# Patient Record
Sex: FEMALE | Race: WHITE | Employment: FULL TIME | URBAN - METROPOLITAN AREA
[De-identification: names, ages, dates, MRNs, and addresses within clinical notes are randomized per-mention and may not be internally consistent; named-entity substitution may affect disease eponyms.]

---

## 2017-01-20 ENCOUNTER — HOSPITAL ENCOUNTER (OUTPATIENT)
Dept: PREADMISSION TESTING | Age: 52
Discharge: HOME | End: 2017-01-20
Payer: COMMERCIAL

## 2017-01-20 VITALS
HEIGHT: 70 IN | TEMPERATURE: 97.8 F | HEART RATE: 71 BPM | SYSTOLIC BLOOD PRESSURE: 127 MMHG | BODY MASS INDEX: 41.95 KG/M2 | DIASTOLIC BLOOD PRESSURE: 81 MMHG | WEIGHT: 293 LBS

## 2017-01-20 LAB
ABO + RH BLD: NORMAL
ANION GAP BLD CALC-SCNC: 4 MMOL/L (ref 5–15)
APPEARANCE UR: ABNORMAL
ATRIAL RATE: 66 BPM
BACTERIA URNS QL MICRO: ABNORMAL /HPF
BILIRUB UR QL: NEGATIVE
BLOOD GROUP ANTIBODIES SERPL: NORMAL
BUN SERPL-MCNC: 18 MG/DL (ref 6–20)
BUN/CREAT SERPL: 32 (ref 12–20)
CALCIUM SERPL-MCNC: 9.2 MG/DL (ref 8.5–10.1)
CALCULATED P AXIS, ECG09: 48 DEGREES
CALCULATED R AXIS, ECG10: 44 DEGREES
CALCULATED T AXIS, ECG11: 22 DEGREES
CHLORIDE SERPL-SCNC: 105 MMOL/L (ref 97–108)
CO2 SERPL-SCNC: 33 MMOL/L (ref 21–32)
COLOR UR: ABNORMAL
CREAT SERPL-MCNC: 0.57 MG/DL (ref 0.55–1.02)
DIAGNOSIS, 93000: NORMAL
EPITH CASTS URNS QL MICRO: ABNORMAL /LPF
ERYTHROCYTE [DISTWIDTH] IN BLOOD BY AUTOMATED COUNT: 13.9 % (ref 11.5–14.5)
EST. AVERAGE GLUCOSE BLD GHB EST-MCNC: NORMAL MG/DL
GLUCOSE SERPL-MCNC: 92 MG/DL (ref 65–100)
GLUCOSE UR STRIP.AUTO-MCNC: NEGATIVE MG/DL
HBA1C MFR BLD: 4.8 % (ref 4.2–6.3)
HCG SERPL QL: NEGATIVE
HCT VFR BLD AUTO: 40.7 % (ref 35–47)
HGB BLD-MCNC: 13.1 G/DL (ref 11.5–16)
HGB UR QL STRIP: ABNORMAL
HYALINE CASTS URNS QL MICRO: ABNORMAL /LPF (ref 0–5)
INR PPP: 1.1 (ref 0.9–1.1)
KETONES UR QL STRIP.AUTO: NEGATIVE MG/DL
LEUKOCYTE ESTERASE UR QL STRIP.AUTO: NEGATIVE
MCH RBC QN AUTO: 30.8 PG (ref 26–34)
MCHC RBC AUTO-ENTMCNC: 32.2 G/DL (ref 30–36.5)
MCV RBC AUTO: 95.5 FL (ref 80–99)
NITRITE UR QL STRIP.AUTO: POSITIVE
P-R INTERVAL, ECG05: 180 MS
PH UR STRIP: 6 [PH] (ref 5–8)
PLATELET # BLD AUTO: 543 K/UL (ref 150–400)
POTASSIUM SERPL-SCNC: 4.2 MMOL/L (ref 3.5–5.1)
PROT UR STRIP-MCNC: NEGATIVE MG/DL
PROTHROMBIN TIME: 10.9 SEC (ref 9–11.1)
Q-T INTERVAL, ECG07: 418 MS
QRS DURATION, ECG06: 88 MS
QTC CALCULATION (BEZET), ECG08: 438 MS
RBC # BLD AUTO: 4.26 M/UL (ref 3.8–5.2)
RBC #/AREA URNS HPF: ABNORMAL /HPF (ref 0–5)
SODIUM SERPL-SCNC: 142 MMOL/L (ref 136–145)
SP GR UR REFRACTOMETRY: 1.02 (ref 1–1.03)
SPECIMEN EXP DATE BLD: NORMAL
UA: UC IF INDICATED,UAUC: ABNORMAL
UROBILINOGEN UR QL STRIP.AUTO: 0.2 EU/DL (ref 0.2–1)
VENTRICULAR RATE, ECG03: 66 BPM
WBC # BLD AUTO: 6.1 K/UL (ref 3.6–11)
WBC URNS QL MICRO: ABNORMAL /HPF (ref 0–4)

## 2017-01-20 PROCEDURE — 87186 SC STD MICRODIL/AGAR DIL: CPT | Performed by: ORTHOPAEDIC SURGERY

## 2017-01-20 PROCEDURE — 87086 URINE CULTURE/COLONY COUNT: CPT | Performed by: ORTHOPAEDIC SURGERY

## 2017-01-20 PROCEDURE — 87077 CULTURE AEROBIC IDENTIFY: CPT | Performed by: ORTHOPAEDIC SURGERY

## 2017-01-20 PROCEDURE — 93005 ELECTROCARDIOGRAM TRACING: CPT

## 2017-01-20 PROCEDURE — 81001 URINALYSIS AUTO W/SCOPE: CPT | Performed by: ORTHOPAEDIC SURGERY

## 2017-01-20 PROCEDURE — 83036 HEMOGLOBIN GLYCOSYLATED A1C: CPT | Performed by: ORTHOPAEDIC SURGERY

## 2017-01-20 PROCEDURE — 36415 COLL VENOUS BLD VENIPUNCTURE: CPT | Performed by: ORTHOPAEDIC SURGERY

## 2017-01-20 PROCEDURE — 85610 PROTHROMBIN TIME: CPT | Performed by: ORTHOPAEDIC SURGERY

## 2017-01-20 PROCEDURE — 84703 CHORIONIC GONADOTROPIN ASSAY: CPT | Performed by: ORTHOPAEDIC SURGERY

## 2017-01-20 PROCEDURE — 80048 BASIC METABOLIC PNL TOTAL CA: CPT | Performed by: ORTHOPAEDIC SURGERY

## 2017-01-20 PROCEDURE — 85027 COMPLETE CBC AUTOMATED: CPT | Performed by: ORTHOPAEDIC SURGERY

## 2017-01-20 PROCEDURE — 86900 BLOOD TYPING SEROLOGIC ABO: CPT | Performed by: ORTHOPAEDIC SURGERY

## 2017-01-20 RX ORDER — TRAMADOL HYDROCHLORIDE 50 MG/1
50 TABLET ORAL
COMMUNITY
End: 2017-02-03

## 2017-01-20 RX ORDER — BISMUTH SUBSALICYLATE 262 MG
1 TABLET,CHEWABLE ORAL DAILY
COMMUNITY
End: 2018-03-30

## 2017-01-20 RX ORDER — CELECOXIB 200 MG/1
200 CAPSULE ORAL DAILY
COMMUNITY
End: 2018-03-30

## 2017-01-20 RX ORDER — NAPROXEN 250 MG/1
250 TABLET ORAL AS NEEDED
COMMUNITY
End: 2017-02-03

## 2017-01-20 NOTE — PERIOP NOTES
Preoperative instructions reviewed with patient. Patient given six packs of CHG wipes. Instructions(reviewed/to be reviewed in class) on use of CHG wipes. Patient given SSI infection FAQS sheet, as well as a  MRSA/MSSA treatment instruction sheet  With an explanation to patient that they will be notified if treatment instructions need to be initiated. Patient was given the opportunity to ask questions on the information provided.  INCENTIVE SPIROMETER INSTRUCTIONS GIVEN TO PT,PT DEMONSTRATED WELL

## 2017-01-21 LAB
BACTERIA SPEC CULT: NORMAL
BACTERIA SPEC CULT: NORMAL
SERVICE CMNT-IMP: NORMAL

## 2017-01-22 LAB
BACTERIA SPEC CULT: NORMAL
CC UR VC: NORMAL
SERVICE CMNT-IMP: NORMAL

## 2017-01-23 NOTE — PERIOP NOTES
LEFT VMM FOR TONY KAHN AT DR VAUGHN'S OFFICE FOR ABNORMAL URINE CULTURE >100,000  COLONIES, E.COLI , PLATELET 720

## 2017-01-24 RX ORDER — SULFAMETHOXAZOLE AND TRIMETHOPRIM 800; 160 MG/1; MG/1
1 TABLET ORAL 2 TIMES DAILY
Qty: 10 TAB | Refills: 0 | Status: SHIPPED | OUTPATIENT
Start: 2017-01-24 | End: 2017-01-29

## 2017-01-24 RX ORDER — MUPIROCIN 20 MG/G
OINTMENT TOPICAL 2 TIMES DAILY
Qty: 22 G | Refills: 0 | Status: SHIPPED | OUTPATIENT
Start: 2017-01-25 | End: 2017-02-03

## 2017-01-24 NOTE — ADVANCED PRACTICE NURSE
Patient was called (identity confirmed with 3 patient identifiers) and informed of positive MRSA, instructed to obtain mupirocin prescription and Chlorhexidine liquid soap from pharmacy per protocol. Written instructions given at time of Preadmission Testing were reinforced with patient. Patient was given an opportunity to have questions answered and contact information if needed. Patient is a 45 y/o female who was seen by the PAT RN as a standard pre-op appointment on 1/20/17. Reviewed culture results on 1/23/17 and results were >100,000 colonies E.coli. Prescription for Bactrim DS bid x5 days e-prescribed to [facility]. Patient notified of prescription and possible side effects, and verbalized understanding of treatment regimen, goals of therapy, and UTI preventive measures. Provided contact info for further questions and reasons to follow up with PCP/surgeon, if needed.   RANJAN Kay

## 2017-02-01 RX ORDER — ACETAMINOPHEN 500 MG
1000 TABLET ORAL ONCE
Status: CANCELLED | OUTPATIENT
Start: 2017-02-01 | End: 2017-02-01

## 2017-02-01 RX ORDER — DEXAMETHASONE SODIUM PHOSPHATE 100 MG/10ML
10 INJECTION INTRAMUSCULAR; INTRAVENOUS ONCE
Status: CANCELLED | OUTPATIENT
Start: 2017-02-01 | End: 2017-02-01

## 2017-02-01 RX ORDER — CELECOXIB 200 MG/1
200 CAPSULE ORAL ONCE
Status: CANCELLED | OUTPATIENT
Start: 2017-02-01 | End: 2017-02-01

## 2017-02-02 ENCOUNTER — ANESTHESIA EVENT (OUTPATIENT)
Dept: SURGERY | Age: 52
DRG: 470 | End: 2017-02-02
Payer: COMMERCIAL

## 2017-02-02 ENCOUNTER — ANESTHESIA (OUTPATIENT)
Dept: SURGERY | Age: 52
DRG: 470 | End: 2017-02-02
Payer: COMMERCIAL

## 2017-02-02 ENCOUNTER — APPOINTMENT (OUTPATIENT)
Dept: GENERAL RADIOLOGY | Age: 52
DRG: 470 | End: 2017-02-02
Attending: ORTHOPAEDIC SURGERY
Payer: COMMERCIAL

## 2017-02-02 ENCOUNTER — SURGERY (OUTPATIENT)
Age: 52
End: 2017-02-02

## 2017-02-02 PROBLEM — M16.11 PRIMARY LOCALIZED OSTEOARTHRITIS OF RIGHT HIP: Status: ACTIVE | Noted: 2017-02-02

## 2017-02-02 PROCEDURE — 74011250636 HC RX REV CODE- 250/636

## 2017-02-02 PROCEDURE — 74011000258 HC RX REV CODE- 258

## 2017-02-02 PROCEDURE — 77030026438 HC STYL ET INTUB CARD -A: Performed by: ANESTHESIOLOGY

## 2017-02-02 PROCEDURE — 73501 X-RAY EXAM HIP UNI 1 VIEW: CPT

## 2017-02-02 PROCEDURE — 74011000250 HC RX REV CODE- 250

## 2017-02-02 PROCEDURE — 74011000250 HC RX REV CODE- 250: Performed by: ANESTHESIOLOGY

## 2017-02-02 PROCEDURE — 77030007866 HC KT SPN ANES BBMI -B: Performed by: ANESTHESIOLOGY

## 2017-02-02 PROCEDURE — 74011250636 HC RX REV CODE- 250/636: Performed by: ORTHOPAEDIC SURGERY

## 2017-02-02 PROCEDURE — 77030013079 HC BLNKT BAIR HGGR 3M -A: Performed by: ANESTHESIOLOGY

## 2017-02-02 PROCEDURE — 77030008684 HC TU ET CUF COVD -B: Performed by: ANESTHESIOLOGY

## 2017-02-02 RX ORDER — NEOSTIGMINE METHYLSULFATE 1 MG/ML
INJECTION INTRAVENOUS AS NEEDED
Status: DISCONTINUED | OUTPATIENT
Start: 2017-02-02 | End: 2017-02-02 | Stop reason: HOSPADM

## 2017-02-02 RX ORDER — SUCCINYLCHOLINE CHLORIDE 20 MG/ML
INJECTION INTRAMUSCULAR; INTRAVENOUS AS NEEDED
Status: DISCONTINUED | OUTPATIENT
Start: 2017-02-02 | End: 2017-02-02 | Stop reason: HOSPADM

## 2017-02-02 RX ORDER — PHENYLEPHRINE HCL IN 0.9% NACL 0.4MG/10ML
SYRINGE (ML) INTRAVENOUS AS NEEDED
Status: DISCONTINUED | OUTPATIENT
Start: 2017-02-02 | End: 2017-02-02 | Stop reason: HOSPADM

## 2017-02-02 RX ORDER — ONDANSETRON 2 MG/ML
INJECTION INTRAMUSCULAR; INTRAVENOUS AS NEEDED
Status: DISCONTINUED | OUTPATIENT
Start: 2017-02-02 | End: 2017-02-02 | Stop reason: HOSPADM

## 2017-02-02 RX ORDER — SODIUM CHLORIDE, SODIUM LACTATE, POTASSIUM CHLORIDE, CALCIUM CHLORIDE 600; 310; 30; 20 MG/100ML; MG/100ML; MG/100ML; MG/100ML
INJECTION, SOLUTION INTRAVENOUS
Status: DISCONTINUED | OUTPATIENT
Start: 2017-02-02 | End: 2017-02-02 | Stop reason: HOSPADM

## 2017-02-02 RX ORDER — HYDROMORPHONE HYDROCHLORIDE 1 MG/ML
INJECTION, SOLUTION INTRAMUSCULAR; INTRAVENOUS; SUBCUTANEOUS AS NEEDED
Status: DISCONTINUED | OUTPATIENT
Start: 2017-02-02 | End: 2017-02-02 | Stop reason: HOSPADM

## 2017-02-02 RX ORDER — PROPOFOL 10 MG/ML
INJECTION, EMULSION INTRAVENOUS
Status: DISCONTINUED | OUTPATIENT
Start: 2017-02-02 | End: 2017-02-02 | Stop reason: HOSPADM

## 2017-02-02 RX ORDER — MIDAZOLAM HYDROCHLORIDE 1 MG/ML
INJECTION, SOLUTION INTRAMUSCULAR; INTRAVENOUS AS NEEDED
Status: DISCONTINUED | OUTPATIENT
Start: 2017-02-02 | End: 2017-02-02 | Stop reason: HOSPADM

## 2017-02-02 RX ORDER — PROPOFOL 10 MG/ML
INJECTION, EMULSION INTRAVENOUS AS NEEDED
Status: DISCONTINUED | OUTPATIENT
Start: 2017-02-02 | End: 2017-02-02 | Stop reason: HOSPADM

## 2017-02-02 RX ORDER — BUPIVACAINE HYDROCHLORIDE 5 MG/ML
INJECTION, SOLUTION EPIDURAL; INTRACAUDAL AS NEEDED
Status: DISCONTINUED | OUTPATIENT
Start: 2017-02-02 | End: 2017-02-02 | Stop reason: HOSPADM

## 2017-02-02 RX ORDER — ROCURONIUM BROMIDE 10 MG/ML
INJECTION, SOLUTION INTRAVENOUS AS NEEDED
Status: DISCONTINUED | OUTPATIENT
Start: 2017-02-02 | End: 2017-02-02 | Stop reason: HOSPADM

## 2017-02-02 RX ORDER — GLYCOPYRROLATE 0.2 MG/ML
INJECTION INTRAMUSCULAR; INTRAVENOUS AS NEEDED
Status: DISCONTINUED | OUTPATIENT
Start: 2017-02-02 | End: 2017-02-02 | Stop reason: HOSPADM

## 2017-02-02 RX ORDER — KETAMINE HYDROCHLORIDE 50 MG/ML
INJECTION, SOLUTION INTRAMUSCULAR; INTRAVENOUS AS NEEDED
Status: DISCONTINUED | OUTPATIENT
Start: 2017-02-02 | End: 2017-02-02 | Stop reason: HOSPADM

## 2017-02-02 RX ADMIN — BUPIVACAINE 102 ML: 13.3 INJECTION, SUSPENSION, LIPOSOMAL INFILTRATION at 11:29

## 2017-02-02 RX ADMIN — KETAMINE HYDROCHLORIDE 20 MG: 50 INJECTION, SOLUTION INTRAMUSCULAR; INTRAVENOUS at 10:03

## 2017-02-02 RX ADMIN — SUCCINYLCHOLINE CHLORIDE 180 MG: 20 INJECTION INTRAMUSCULAR; INTRAVENOUS at 10:09

## 2017-02-02 RX ADMIN — PROPOFOL 50 MCG/KG/MIN: 10 INJECTION, EMULSION INTRAVENOUS at 09:48

## 2017-02-02 RX ADMIN — ROCURONIUM BROMIDE 25 MG: 10 INJECTION, SOLUTION INTRAVENOUS at 10:31

## 2017-02-02 RX ADMIN — HYDROMORPHONE HYDROCHLORIDE 0.25 MG: 1 INJECTION, SOLUTION INTRAMUSCULAR; INTRAVENOUS; SUBCUTANEOUS at 11:46

## 2017-02-02 RX ADMIN — HYDROMORPHONE HYDROCHLORIDE 0.25 MG: 1 INJECTION, SOLUTION INTRAMUSCULAR; INTRAVENOUS; SUBCUTANEOUS at 11:55

## 2017-02-02 RX ADMIN — ROCURONIUM BROMIDE 5 MG: 10 INJECTION, SOLUTION INTRAVENOUS at 10:08

## 2017-02-02 RX ADMIN — PROPOFOL 50 MG: 10 INJECTION, EMULSION INTRAVENOUS at 09:56

## 2017-02-02 RX ADMIN — PROPOFOL 50 MG: 10 INJECTION, EMULSION INTRAVENOUS at 09:47

## 2017-02-02 RX ADMIN — PROPOFOL 50 MG: 10 INJECTION, EMULSION INTRAVENOUS at 09:59

## 2017-02-02 RX ADMIN — SODIUM CHLORIDE, SODIUM LACTATE, POTASSIUM CHLORIDE, CALCIUM CHLORIDE: 600; 310; 30; 20 INJECTION, SOLUTION INTRAVENOUS at 09:41

## 2017-02-02 RX ADMIN — HYDROMORPHONE HYDROCHLORIDE 0.25 MG: 1 INJECTION, SOLUTION INTRAMUSCULAR; INTRAVENOUS; SUBCUTANEOUS at 11:40

## 2017-02-02 RX ADMIN — SODIUM CHLORIDE, SODIUM LACTATE, POTASSIUM CHLORIDE, CALCIUM CHLORIDE: 600; 310; 30; 20 INJECTION, SOLUTION INTRAVENOUS at 11:20

## 2017-02-02 RX ADMIN — MIDAZOLAM HYDROCHLORIDE 2 MG: 1 INJECTION, SOLUTION INTRAMUSCULAR; INTRAVENOUS at 09:42

## 2017-02-02 RX ADMIN — DEXAMETHASONE SODIUM PHOSPHATE 10 MG: 10 INJECTION, SOLUTION INTRAMUSCULAR; INTRAVENOUS at 10:14

## 2017-02-02 RX ADMIN — Medication 80 MCG: at 10:18

## 2017-02-02 RX ADMIN — Medication 40 MCG: at 10:39

## 2017-02-02 RX ADMIN — HYDROMORPHONE HYDROCHLORIDE 0.25 MG: 1 INJECTION, SOLUTION INTRAMUSCULAR; INTRAVENOUS; SUBCUTANEOUS at 12:00

## 2017-02-02 RX ADMIN — BUPIVACAINE HYDROCHLORIDE 15 MG: 5 INJECTION, SOLUTION EPIDURAL; INTRACAUDAL at 09:48

## 2017-02-02 RX ADMIN — GLYCOPYRROLATE 0.4 MG: 0.2 INJECTION INTRAMUSCULAR; INTRAVENOUS at 11:49

## 2017-02-02 RX ADMIN — Medication 40 MCG: at 10:42

## 2017-02-02 RX ADMIN — SODIUM CHLORIDE, SODIUM LACTATE, POTASSIUM CHLORIDE, CALCIUM CHLORIDE: 600; 310; 30; 20 INJECTION, SOLUTION INTRAVENOUS at 12:00

## 2017-02-02 RX ADMIN — Medication 80 MCG: at 10:45

## 2017-02-02 RX ADMIN — ONDANSETRON 4 MG: 2 INJECTION INTRAMUSCULAR; INTRAVENOUS at 11:33

## 2017-02-02 RX ADMIN — PROPOFOL 200 MG: 10 INJECTION, EMULSION INTRAVENOUS at 10:08

## 2017-02-02 RX ADMIN — CEFAZOLIN 3 G: 1 INJECTION, POWDER, FOR SOLUTION INTRAMUSCULAR; INTRAVENOUS; PARENTERAL at 10:05

## 2017-02-02 RX ADMIN — GLYCOPYRROLATE 0.2 MG: 0.2 INJECTION INTRAMUSCULAR; INTRAVENOUS at 11:10

## 2017-02-02 RX ADMIN — NEOSTIGMINE METHYLSULFATE 3 MG: 1 INJECTION INTRAVENOUS at 11:49

## 2017-02-02 RX ADMIN — Medication 40 MCG: at 10:33

## 2017-02-02 RX ADMIN — Medication 80 MCG: at 10:21

## 2017-02-02 RX ADMIN — Medication 80 MCG: at 10:30

## 2017-02-02 RX ADMIN — Medication 80 MCG: at 10:36

## 2017-02-02 RX ADMIN — MIDAZOLAM HYDROCHLORIDE 1 MG: 1 INJECTION, SOLUTION INTRAMUSCULAR; INTRAVENOUS at 09:51

## 2017-02-02 RX ADMIN — PROPOFOL 50 MG: 10 INJECTION, EMULSION INTRAVENOUS at 09:52

## 2017-02-02 RX ADMIN — KETAMINE HYDROCHLORIDE 10 MG: 50 INJECTION, SOLUTION INTRAMUSCULAR; INTRAVENOUS at 09:51

## 2017-02-02 NOTE — ANESTHESIA POSTPROCEDURE EVALUATION
Post-Anesthesia Evaluation and Assessment    Patient: Sarah Beth Vogt MRN: [id number]  SSN: [id number]    YOB: 1965  Age: 46 y.o. Sex: female       Cardiovascular Function/Vital Signs  Visit Vitals    /66    Pulse 62    Temp 36.6 °C (97.8 °F)    Resp 14    Ht 5' 10\" (1.778 m)    Wt 133.8 kg (295 lb)    SpO2 97%    BMI 42.33 kg/m2       Patient is status post spinal anesthesia for Procedure(s):  RIGHT TOTAL HIP ARTHROPLASTY. Nausea/Vomiting: None    Postoperative hydration reviewed and adequate. Pain:  Pain Scale 1: Numeric (0 - 10) (02/02/17 1220)  Pain Intensity 1: 0 (02/02/17 1220)   Managed    Neurological Status:   Neuro (WDL): Within Defined Limits (02/02/17 1220)  Neuro  LLE Motor Response: Pharmacologically paralyzed (02/02/17 1220)  RLE Motor Response: Pharmacologically paralyzed (02/02/17 1220)   At baseline    Mental Status and Level of Consciousness: Arousable    Pulmonary Status:   O2 Device: CO2 nasal cannula (02/02/17 1220)   Adequate oxygenation and airway patent    Complications related to anesthesia: None    Post-anesthesia assessment completed.  No concerns    Signed By: Rodo Guerra MD     February 2, 2017

## 2017-02-02 NOTE — ANESTHESIA PREPROCEDURE EVALUATION
Anesthetic History   No history of anesthetic complications            Review of Systems / Medical History  Patient summary reviewed, nursing notes reviewed and pertinent labs reviewed    Pulmonary  Within defined limits                 Neuro/Psych     seizures         Cardiovascular  Within defined limits                     GI/Hepatic/Renal  Within defined limits              Endo/Other        Morbid obesity and arthritis     Other Findings              Physical Exam    Airway  Mallampati: II  TM Distance: 4 - 6 cm  Neck ROM: normal range of motion   Mouth opening: Normal     Cardiovascular  Regular rate and rhythm,  S1 and S2 normal,  no murmur, click, rub, or gallop             Dental  No notable dental hx       Pulmonary  Breath sounds clear to auscultation               Abdominal  GI exam deferred       Other Findings            Anesthetic Plan    ASA: 2  Anesthesia type: spinal          Induction: Intravenous  Anesthetic plan and risks discussed with: Patient

## 2017-02-02 NOTE — ANESTHESIA PROCEDURE NOTES
Spinal Block    Performed by: Roberta Kussmaul  Authorized by: Roberta Kussmaul     Pre-procedure:   Indications: at surgeon's request and primary anesthetic  Preanesthetic Checklist: patient identified, risks and benefits discussed, site marked, patient being monitored and timeout performed      Spinal Block:   Patient Position:  Seated    Prep: Betadine      Location:  L2-3  Technique:  Single shot        Needle:   Needle Type:  Pencil-tip  Needle Gauge:  25 G  Attempts:  1      Events: CSF confirmed, no blood with aspiration and no paresthesia        Assessment:  Insertion:  Uncomplicated  Patient tolerance:  Patient tolerated the procedure well with no immediate complications  15 mg 0.5 % plain bupivacaine- partial block- conversion to GETA

## 2018-03-30 ENCOUNTER — HOSPITAL ENCOUNTER (OUTPATIENT)
Dept: PREADMISSION TESTING | Age: 53
Discharge: HOME | End: 2018-03-30
Payer: COMMERCIAL

## 2018-03-30 VITALS
SYSTOLIC BLOOD PRESSURE: 123 MMHG | HEIGHT: 68 IN | BODY MASS INDEX: 44.41 KG/M2 | DIASTOLIC BLOOD PRESSURE: 78 MMHG | WEIGHT: 293 LBS | TEMPERATURE: 97.7 F | HEART RATE: 64 BPM

## 2018-03-30 LAB
ABO + RH BLD: NORMAL
ANION GAP SERPL CALC-SCNC: 4 MMOL/L (ref 5–15)
APPEARANCE UR: CLEAR
BACTERIA URNS QL MICRO: NEGATIVE /HPF
BILIRUB UR QL: NEGATIVE
BLOOD GROUP ANTIBODIES SERPL: NORMAL
BUN SERPL-MCNC: 21 MG/DL (ref 6–20)
BUN/CREAT SERPL: 30 (ref 12–20)
CALCIUM SERPL-MCNC: 8.7 MG/DL (ref 8.5–10.1)
CHLORIDE SERPL-SCNC: 108 MMOL/L (ref 97–108)
CO2 SERPL-SCNC: 30 MMOL/L (ref 21–32)
COLOR UR: NORMAL
CREAT SERPL-MCNC: 0.7 MG/DL (ref 0.55–1.02)
EPITH CASTS URNS QL MICRO: NORMAL /LPF
ERYTHROCYTE [DISTWIDTH] IN BLOOD BY AUTOMATED COUNT: 13.6 % (ref 11.5–14.5)
EST. AVERAGE GLUCOSE BLD GHB EST-MCNC: 105 MG/DL
GLUCOSE SERPL-MCNC: 65 MG/DL (ref 65–100)
GLUCOSE UR STRIP.AUTO-MCNC: NEGATIVE MG/DL
HBA1C MFR BLD: 5.3 % (ref 4.2–6.3)
HCT VFR BLD AUTO: 38.1 % (ref 35–47)
HGB BLD-MCNC: 12.3 G/DL (ref 11.5–16)
HGB UR QL STRIP: NEGATIVE
HYALINE CASTS URNS QL MICRO: NORMAL /LPF (ref 0–5)
INR PPP: 1.1 (ref 0.9–1.1)
KETONES UR QL STRIP.AUTO: NEGATIVE MG/DL
LEUKOCYTE ESTERASE UR QL STRIP.AUTO: NEGATIVE
MCH RBC QN AUTO: 32.6 PG (ref 26–34)
MCHC RBC AUTO-ENTMCNC: 32.3 G/DL (ref 30–36.5)
MCV RBC AUTO: 101.1 FL (ref 80–99)
NITRITE UR QL STRIP.AUTO: NEGATIVE
NRBC # BLD: 0 K/UL (ref 0–0.01)
NRBC BLD-RTO: 0 PER 100 WBC
PH UR STRIP: 6 [PH] (ref 5–8)
PLATELET # BLD AUTO: 628 K/UL (ref 150–400)
PMV BLD AUTO: 12 FL (ref 8.9–12.9)
POTASSIUM SERPL-SCNC: 4.4 MMOL/L (ref 3.5–5.1)
PROT UR STRIP-MCNC: NEGATIVE MG/DL
PROTHROMBIN TIME: 11.1 SEC (ref 9–11.1)
RBC # BLD AUTO: 3.77 M/UL (ref 3.8–5.2)
RBC #/AREA URNS HPF: NORMAL /HPF (ref 0–5)
SODIUM SERPL-SCNC: 142 MMOL/L (ref 136–145)
SP GR UR REFRACTOMETRY: 1.03 (ref 1–1.03)
SPECIMEN EXP DATE BLD: NORMAL
UA: UC IF INDICATED,UAUC: NORMAL
UROBILINOGEN UR QL STRIP.AUTO: 0.2 EU/DL (ref 0.2–1)
WBC # BLD AUTO: 5.1 K/UL (ref 3.6–11)
WBC URNS QL MICRO: NORMAL /HPF (ref 0–4)

## 2018-03-30 PROCEDURE — 86900 BLOOD TYPING SEROLOGIC ABO: CPT | Performed by: ORTHOPAEDIC SURGERY

## 2018-03-30 PROCEDURE — 93005 ELECTROCARDIOGRAM TRACING: CPT

## 2018-03-30 PROCEDURE — 83036 HEMOGLOBIN GLYCOSYLATED A1C: CPT | Performed by: ORTHOPAEDIC SURGERY

## 2018-03-30 PROCEDURE — 81001 URINALYSIS AUTO W/SCOPE: CPT | Performed by: ORTHOPAEDIC SURGERY

## 2018-03-30 PROCEDURE — 85610 PROTHROMBIN TIME: CPT | Performed by: ORTHOPAEDIC SURGERY

## 2018-03-30 PROCEDURE — 85027 COMPLETE CBC AUTOMATED: CPT | Performed by: ORTHOPAEDIC SURGERY

## 2018-03-30 PROCEDURE — 80048 BASIC METABOLIC PNL TOTAL CA: CPT | Performed by: ORTHOPAEDIC SURGERY

## 2018-03-30 RX ORDER — FUROSEMIDE 20 MG/1
20 TABLET ORAL DAILY
COMMUNITY
End: 2019-09-11

## 2018-03-30 RX ORDER — DICLOFENAC SODIUM 75 MG/1
75 TABLET, DELAYED RELEASE ORAL DAILY
COMMUNITY
End: 2019-09-22

## 2018-03-30 RX ORDER — IBUPROFEN 200 MG
200 TABLET ORAL 2 TIMES DAILY
COMMUNITY
End: 2018-04-14

## 2018-03-30 RX ORDER — PHENYTOIN SODIUM 300 MG/1
330 CAPSULE, EXTENDED RELEASE ORAL
COMMUNITY
End: 2020-03-13

## 2018-03-30 NOTE — PERIOP NOTES
PREOPERATIVE INSTRUCTIONS REVIEWED WITH PATIENT. PATIENT GIVEN SIX PACK OF CHG WIPES. INSTRUCTIONS [REVIEWEDON USE OF CHG WIPES. PATIENT GIVEN SSI INFECTION FAQ SHEET, INFORMATION SHEET ON DIABETIC TREATMENT CENTER AS WELL AS HAND WASHING TIPS SHEETS. MRSA/MSSA TREATMENT INSTRUCTION SHEET GIVEN WITH AN EXPLANATION TO PATIENT THAT THEY WILL BE NOTIFIED IF TREATMENT INSTRUCTIONS NEED TO BE INITIATED. PATIENT WAS GIVEN THE OPPORTUNITY TO ASK QUESTIONS ON THE INFORMATION PROVIDED.

## 2018-03-31 LAB
BACTERIA SPEC CULT: NORMAL
BACTERIA SPEC CULT: NORMAL
SERVICE CMNT-IMP: NORMAL

## 2018-04-02 LAB
ATRIAL RATE: 62 BPM
CALCULATED P AXIS, ECG09: 39 DEGREES
CALCULATED R AXIS, ECG10: 17 DEGREES
CALCULATED T AXIS, ECG11: 15 DEGREES
DIAGNOSIS, 93000: NORMAL
P-R INTERVAL, ECG05: 180 MS
Q-T INTERVAL, ECG07: 424 MS
QRS DURATION, ECG06: 86 MS
QTC CALCULATION (BEZET), ECG08: 430 MS
VENTRICULAR RATE, ECG03: 62 BPM

## 2018-04-02 NOTE — PERIOP NOTES
Left message on voicemail for [facility] at Dr. Rowan Nissen office and reported -[phone]. Labs, EKG faxed to Dr. Rowan Nissen office.

## 2018-04-10 RX ORDER — CELECOXIB 200 MG/1
200 CAPSULE ORAL ONCE
Status: CANCELLED | OUTPATIENT
Start: 2018-04-10 | End: 2018-04-10

## 2018-04-10 RX ORDER — PREGABALIN 75 MG/1
75 CAPSULE ORAL ONCE
Status: CANCELLED | OUTPATIENT
Start: 2018-04-10 | End: 2018-04-10

## 2018-04-10 RX ORDER — ACETAMINOPHEN 500 MG
1000 TABLET ORAL ONCE
Status: CANCELLED | OUTPATIENT
Start: 2018-04-10 | End: 2018-04-10

## 2018-04-10 RX ORDER — DEXAMETHASONE SODIUM PHOSPHATE 10 MG/ML
4 INJECTION INTRAMUSCULAR; INTRAVENOUS ONCE
Status: CANCELLED | OUTPATIENT
Start: 2018-04-10 | End: 2018-04-11

## 2018-04-11 ENCOUNTER — ANESTHESIA EVENT (OUTPATIENT)
Dept: SURGERY | Age: 53
DRG: 470 | End: 2018-04-11
Payer: COMMERCIAL

## 2018-04-12 ENCOUNTER — HOSPITAL ENCOUNTER (INPATIENT)
Age: 53
LOS: 2 days | Discharge: HOME HEALTH | DRG: 470 | End: 2018-04-14
Attending: ORTHOPAEDIC SURGERY | Admitting: ORTHOPAEDIC SURGERY
Payer: COMMERCIAL

## 2018-04-12 ENCOUNTER — ANESTHESIA (OUTPATIENT)
Dept: SURGERY | Age: 53
DRG: 470 | End: 2018-04-12
Payer: COMMERCIAL

## 2018-04-12 DIAGNOSIS — Z96.651 STATUS POST RIGHT KNEE REPLACEMENT: Primary | ICD-10-CM

## 2018-04-12 PROBLEM — M17.11 PRIMARY LOCALIZED OSTEOARTHRITIS OF RIGHT KNEE: Status: ACTIVE | Noted: 2018-04-12

## 2018-04-12 LAB
GLUCOSE BLD STRIP.AUTO-MCNC: 101 MG/DL (ref 65–100)
SERVICE CMNT-IMP: ABNORMAL

## 2018-04-12 PROCEDURE — 77030012935 HC DRSG AQUACEL BMS -B: Performed by: ORTHOPAEDIC SURGERY

## 2018-04-12 PROCEDURE — C1776 JOINT DEVICE (IMPLANTABLE): HCPCS | Performed by: ORTHOPAEDIC SURGERY

## 2018-04-12 PROCEDURE — 77030020782 HC GWN BAIR PAWS FLX 3M -B

## 2018-04-12 PROCEDURE — 77030002933 HC SUT MCRYL J&J -A: Performed by: ORTHOPAEDIC SURGERY

## 2018-04-12 PROCEDURE — 74011250637 HC RX REV CODE- 250/637: Performed by: ORTHOPAEDIC SURGERY

## 2018-04-12 PROCEDURE — 97161 PT EVAL LOW COMPLEX 20 MIN: CPT | Performed by: PHYSICAL THERAPIST

## 2018-04-12 PROCEDURE — 77030003671 HC NDL SPN HAVL -B: Performed by: ANESTHESIOLOGY

## 2018-04-12 PROCEDURE — 77030005515 HC CATH URETH FOL14 BARD -B: Performed by: ORTHOPAEDIC SURGERY

## 2018-04-12 PROCEDURE — 77030031139 HC SUT VCRL2 J&J -A: Performed by: ORTHOPAEDIC SURGERY

## 2018-04-12 PROCEDURE — 76210000016 HC OR PH I REC 1 TO 1.5 HR: Performed by: ORTHOPAEDIC SURGERY

## 2018-04-12 PROCEDURE — 77030010507 HC ADH SKN DERMBND J&J -B: Performed by: ORTHOPAEDIC SURGERY

## 2018-04-12 PROCEDURE — 77030020788: Performed by: ORTHOPAEDIC SURGERY

## 2018-04-12 PROCEDURE — 74011250636 HC RX REV CODE- 250/636

## 2018-04-12 PROCEDURE — 74011000258 HC RX REV CODE- 258: Performed by: ORTHOPAEDIC SURGERY

## 2018-04-12 PROCEDURE — 77030016547 HC BLD SAW SAG1 STRY -B: Performed by: ORTHOPAEDIC SURGERY

## 2018-04-12 PROCEDURE — 65270000029 HC RM PRIVATE

## 2018-04-12 PROCEDURE — 74011250636 HC RX REV CODE- 250/636: Performed by: ANESTHESIOLOGY

## 2018-04-12 PROCEDURE — 74011000250 HC RX REV CODE- 250: Performed by: ANESTHESIOLOGY

## 2018-04-12 PROCEDURE — 77030003601 HC NDL NRV BLK BBMI -A

## 2018-04-12 PROCEDURE — 82962 GLUCOSE BLOOD TEST: CPT

## 2018-04-12 PROCEDURE — 74011250636 HC RX REV CODE- 250/636: Performed by: ORTHOPAEDIC SURGERY

## 2018-04-12 PROCEDURE — 3E0T3BZ INTRODUCTION OF ANESTHETIC AGENT INTO PERIPHERAL NERVES AND PLEXI, PERCUTANEOUS APPROACH: ICD-10-PCS | Performed by: ANESTHESIOLOGY

## 2018-04-12 PROCEDURE — 77030018836 HC SOL IRR NACL ICUM -A: Performed by: ORTHOPAEDIC SURGERY

## 2018-04-12 PROCEDURE — 77030014077 HC TOWER MX CEM J&J -C: Performed by: ORTHOPAEDIC SURGERY

## 2018-04-12 PROCEDURE — 76060000036 HC ANESTHESIA 2.5 TO 3 HR: Performed by: ORTHOPAEDIC SURGERY

## 2018-04-12 PROCEDURE — 77030018846 HC SOL IRR STRL H20 ICUM -A: Performed by: ORTHOPAEDIC SURGERY

## 2018-04-12 PROCEDURE — 77030033067 HC SUT PDO STRATFX SPIR J&J -B: Performed by: ORTHOPAEDIC SURGERY

## 2018-04-12 PROCEDURE — 77030000032 HC CUF TRNQT ZIMM -B: Performed by: ORTHOPAEDIC SURGERY

## 2018-04-12 PROCEDURE — C1713 ANCHOR/SCREW BN/BN,TIS/BN: HCPCS | Performed by: ORTHOPAEDIC SURGERY

## 2018-04-12 PROCEDURE — 77030020365 HC SOL INJ SOD CL 0.9% 50ML: Performed by: ORTHOPAEDIC SURGERY

## 2018-04-12 PROCEDURE — 74011000250 HC RX REV CODE- 250: Performed by: ORTHOPAEDIC SURGERY

## 2018-04-12 PROCEDURE — 64450 NJX AA&/STRD OTHER PN/BRANCH: CPT

## 2018-04-12 PROCEDURE — 77030014125 HC TY EPDRL BBMI -B: Performed by: ANESTHESIOLOGY

## 2018-04-12 PROCEDURE — 76010000172 HC OR TIME 2.5 TO 3 HR INTENSV-TIER 1: Performed by: ORTHOPAEDIC SURGERY

## 2018-04-12 PROCEDURE — 77030011640 HC PAD GRND REM COVD -A: Performed by: ORTHOPAEDIC SURGERY

## 2018-04-12 PROCEDURE — 97116 GAIT TRAINING THERAPY: CPT | Performed by: PHYSICAL THERAPIST

## 2018-04-12 PROCEDURE — 74011000258 HC RX REV CODE- 258

## 2018-04-12 PROCEDURE — 0SRC0J9 REPLACEMENT OF RIGHT KNEE JOINT WITH SYNTHETIC SUBSTITUTE, CEMENTED, OPEN APPROACH: ICD-10-PCS | Performed by: ORTHOPAEDIC SURGERY

## 2018-04-12 PROCEDURE — C9290 INJ, BUPIVACAINE LIPOSOME: HCPCS | Performed by: ORTHOPAEDIC SURGERY

## 2018-04-12 PROCEDURE — 77030011992 HC AIRWY NASOPHGL TELE -A: Performed by: ANESTHESIOLOGY

## 2018-04-12 PROCEDURE — 74011000250 HC RX REV CODE- 250

## 2018-04-12 PROCEDURE — 77030018822 HC SLV COMPR FT COVD -B

## 2018-04-12 DEVICE — IMPLANTABLE DEVICE
Type: IMPLANTABLE DEVICE | Site: KNEE | Status: FUNCTIONAL
Brand: PERSONA® NATURAL TIBIA®

## 2018-04-12 DEVICE — IMPLANTABLE DEVICE
Type: IMPLANTABLE DEVICE | Site: KNEE | Status: FUNCTIONAL
Brand: PERSONA® VIVACIT-E®

## 2018-04-12 DEVICE — IMPLANTABLE DEVICE
Type: IMPLANTABLE DEVICE | Site: KNEE | Status: FUNCTIONAL
Brand: PERSONA™

## 2018-04-12 DEVICE — IMPLANTABLE DEVICE
Type: IMPLANTABLE DEVICE | Site: KNEE | Status: FUNCTIONAL
Brand: PERSONA®

## 2018-04-12 DEVICE — KIT TKR CEM VIT E SURF AND INSTRMT: Type: IMPLANTABLE DEVICE | Site: KNEE | Status: FUNCTIONAL

## 2018-04-12 DEVICE — SMARTSET GMV HIGH PERFORMANCE GENTAMICIN MEDIUM VISCOSITY BONE CEMENT 40G
Type: IMPLANTABLE DEVICE | Site: KNEE | Status: FUNCTIONAL
Brand: SMARTSET

## 2018-04-12 RX ORDER — HYDROXYZINE HYDROCHLORIDE 10 MG/1
10 TABLET, FILM COATED ORAL
Status: DISCONTINUED | OUTPATIENT
Start: 2018-04-12 | End: 2018-04-14 | Stop reason: HOSPADM

## 2018-04-12 RX ORDER — AMOXICILLIN 250 MG
1 CAPSULE ORAL 2 TIMES DAILY
Status: DISCONTINUED | OUTPATIENT
Start: 2018-04-13 | End: 2018-04-14 | Stop reason: HOSPADM

## 2018-04-12 RX ORDER — GLYCOPYRROLATE 0.2 MG/ML
INJECTION INTRAMUSCULAR; INTRAVENOUS AS NEEDED
Status: DISCONTINUED | OUTPATIENT
Start: 2018-04-12 | End: 2018-04-12 | Stop reason: HOSPADM

## 2018-04-12 RX ORDER — HYDROMORPHONE HYDROCHLORIDE 2 MG/ML
INJECTION, SOLUTION INTRAMUSCULAR; INTRAVENOUS; SUBCUTANEOUS AS NEEDED
Status: DISCONTINUED | OUTPATIENT
Start: 2018-04-12 | End: 2018-04-12 | Stop reason: HOSPADM

## 2018-04-12 RX ORDER — OXYCODONE HYDROCHLORIDE 5 MG/1
5 TABLET ORAL
Status: DISCONTINUED | OUTPATIENT
Start: 2018-04-12 | End: 2018-04-14 | Stop reason: HOSPADM

## 2018-04-12 RX ORDER — CELECOXIB 200 MG/1
200 CAPSULE ORAL ONCE
Status: COMPLETED | OUTPATIENT
Start: 2018-04-12 | End: 2018-04-12

## 2018-04-12 RX ORDER — OXYCODONE HYDROCHLORIDE 5 MG/1
10 TABLET ORAL
Status: DISCONTINUED | OUTPATIENT
Start: 2018-04-12 | End: 2018-04-14 | Stop reason: HOSPADM

## 2018-04-12 RX ORDER — SODIUM CHLORIDE 0.9 % (FLUSH) 0.9 %
5-10 SYRINGE (ML) INJECTION EVERY 8 HOURS
Status: DISCONTINUED | OUTPATIENT
Start: 2018-04-12 | End: 2018-04-12 | Stop reason: HOSPADM

## 2018-04-12 RX ORDER — SODIUM CHLORIDE 0.9 % (FLUSH) 0.9 %
5-10 SYRINGE (ML) INJECTION AS NEEDED
Status: DISCONTINUED | OUTPATIENT
Start: 2018-04-12 | End: 2018-04-14 | Stop reason: HOSPADM

## 2018-04-12 RX ORDER — MIDAZOLAM HYDROCHLORIDE 1 MG/ML
INJECTION, SOLUTION INTRAMUSCULAR; INTRAVENOUS AS NEEDED
Status: DISCONTINUED | OUTPATIENT
Start: 2018-04-12 | End: 2018-04-12 | Stop reason: HOSPADM

## 2018-04-12 RX ORDER — FACIAL-BODY WIPES
10 EACH TOPICAL DAILY PRN
Status: DISCONTINUED | OUTPATIENT
Start: 2018-04-14 | End: 2018-04-14 | Stop reason: HOSPADM

## 2018-04-12 RX ORDER — PREGABALIN 75 MG/1
75 CAPSULE ORAL ONCE
Status: COMPLETED | OUTPATIENT
Start: 2018-04-12 | End: 2018-04-12

## 2018-04-12 RX ORDER — MORPHINE SULFATE 10 MG/ML
2 INJECTION, SOLUTION INTRAMUSCULAR; INTRAVENOUS
Status: DISCONTINUED | OUTPATIENT
Start: 2018-04-12 | End: 2018-04-12 | Stop reason: HOSPADM

## 2018-04-12 RX ORDER — SODIUM CHLORIDE, SODIUM LACTATE, POTASSIUM CHLORIDE, CALCIUM CHLORIDE 600; 310; 30; 20 MG/100ML; MG/100ML; MG/100ML; MG/100ML
125 INJECTION, SOLUTION INTRAVENOUS CONTINUOUS
Status: DISCONTINUED | OUTPATIENT
Start: 2018-04-12 | End: 2018-04-12 | Stop reason: HOSPADM

## 2018-04-12 RX ORDER — MIDAZOLAM HYDROCHLORIDE 1 MG/ML
1 INJECTION, SOLUTION INTRAMUSCULAR; INTRAVENOUS AS NEEDED
Status: DISCONTINUED | OUTPATIENT
Start: 2018-04-12 | End: 2018-04-12 | Stop reason: HOSPADM

## 2018-04-12 RX ORDER — SODIUM CHLORIDE 9 MG/ML
25 INJECTION, SOLUTION INTRAVENOUS CONTINUOUS
Status: DISCONTINUED | OUTPATIENT
Start: 2018-04-12 | End: 2018-04-12 | Stop reason: HOSPADM

## 2018-04-12 RX ORDER — FENTANYL CITRATE 50 UG/ML
50 INJECTION, SOLUTION INTRAMUSCULAR; INTRAVENOUS AS NEEDED
Status: DISCONTINUED | OUTPATIENT
Start: 2018-04-12 | End: 2018-04-12 | Stop reason: HOSPADM

## 2018-04-12 RX ORDER — SODIUM CHLORIDE 0.9 % (FLUSH) 0.9 %
5-10 SYRINGE (ML) INJECTION EVERY 8 HOURS
Status: DISCONTINUED | OUTPATIENT
Start: 2018-04-13 | End: 2018-04-14 | Stop reason: HOSPADM

## 2018-04-12 RX ORDER — OXYCODONE AND ACETAMINOPHEN 5; 325 MG/1; MG/1
1 TABLET ORAL AS NEEDED
Status: DISCONTINUED | OUTPATIENT
Start: 2018-04-12 | End: 2018-04-12 | Stop reason: HOSPADM

## 2018-04-12 RX ORDER — BUPIVACAINE HYDROCHLORIDE 5 MG/ML
INJECTION, SOLUTION EPIDURAL; INTRACAUDAL AS NEEDED
Status: DISCONTINUED | OUTPATIENT
Start: 2018-04-12 | End: 2018-04-12 | Stop reason: HOSPADM

## 2018-04-12 RX ORDER — POLYETHYLENE GLYCOL 3350 17 G/17G
17 POWDER, FOR SOLUTION ORAL DAILY
Status: DISCONTINUED | OUTPATIENT
Start: 2018-04-13 | End: 2018-04-14 | Stop reason: HOSPADM

## 2018-04-12 RX ORDER — NALOXONE HYDROCHLORIDE 0.4 MG/ML
0.4 INJECTION, SOLUTION INTRAMUSCULAR; INTRAVENOUS; SUBCUTANEOUS AS NEEDED
Status: DISCONTINUED | OUTPATIENT
Start: 2018-04-12 | End: 2018-04-14 | Stop reason: HOSPADM

## 2018-04-12 RX ORDER — KETOROLAC TROMETHAMINE 30 MG/ML
30 INJECTION, SOLUTION INTRAMUSCULAR; INTRAVENOUS EVERY 6 HOURS
Status: COMPLETED | OUTPATIENT
Start: 2018-04-12 | End: 2018-04-13

## 2018-04-12 RX ORDER — DIPHENHYDRAMINE HYDROCHLORIDE 50 MG/ML
12.5 INJECTION, SOLUTION INTRAMUSCULAR; INTRAVENOUS AS NEEDED
Status: DISCONTINUED | OUTPATIENT
Start: 2018-04-12 | End: 2018-04-12 | Stop reason: HOSPADM

## 2018-04-12 RX ORDER — ONDANSETRON 2 MG/ML
INJECTION INTRAMUSCULAR; INTRAVENOUS AS NEEDED
Status: DISCONTINUED | OUTPATIENT
Start: 2018-04-12 | End: 2018-04-12 | Stop reason: HOSPADM

## 2018-04-12 RX ORDER — ONDANSETRON 2 MG/ML
4 INJECTION INTRAMUSCULAR; INTRAVENOUS AS NEEDED
Status: DISCONTINUED | OUTPATIENT
Start: 2018-04-12 | End: 2018-04-12 | Stop reason: HOSPADM

## 2018-04-12 RX ORDER — FENTANYL CITRATE 50 UG/ML
25 INJECTION, SOLUTION INTRAMUSCULAR; INTRAVENOUS
Status: DISCONTINUED | OUTPATIENT
Start: 2018-04-12 | End: 2018-04-12 | Stop reason: HOSPADM

## 2018-04-12 RX ORDER — SODIUM CHLORIDE 9 MG/ML
125 INJECTION, SOLUTION INTRAVENOUS CONTINUOUS
Status: DISPENSED | OUTPATIENT
Start: 2018-04-12 | End: 2018-04-13

## 2018-04-12 RX ORDER — HYDROMORPHONE HYDROCHLORIDE 2 MG/ML
0.5 INJECTION, SOLUTION INTRAMUSCULAR; INTRAVENOUS; SUBCUTANEOUS
Status: DISCONTINUED | OUTPATIENT
Start: 2018-04-12 | End: 2018-04-14 | Stop reason: HOSPADM

## 2018-04-12 RX ORDER — WARFARIN 4 MG/1
4 TABLET ORAL ONCE
Status: COMPLETED | OUTPATIENT
Start: 2018-04-12 | End: 2018-04-12

## 2018-04-12 RX ORDER — DEXAMETHASONE SODIUM PHOSPHATE 4 MG/ML
INJECTION, SOLUTION INTRA-ARTICULAR; INTRALESIONAL; INTRAMUSCULAR; INTRAVENOUS; SOFT TISSUE AS NEEDED
Status: DISCONTINUED | OUTPATIENT
Start: 2018-04-12 | End: 2018-04-12 | Stop reason: HOSPADM

## 2018-04-12 RX ORDER — SODIUM CHLORIDE 0.9 % (FLUSH) 0.9 %
5-10 SYRINGE (ML) INJECTION AS NEEDED
Status: DISCONTINUED | OUTPATIENT
Start: 2018-04-12 | End: 2018-04-12 | Stop reason: HOSPADM

## 2018-04-12 RX ORDER — ACETAMINOPHEN 500 MG
1000 TABLET ORAL ONCE
Status: COMPLETED | OUTPATIENT
Start: 2018-04-12 | End: 2018-04-12

## 2018-04-12 RX ORDER — PHENYTOIN SODIUM 100 MG/1
300 CAPSULE, EXTENDED RELEASE ORAL
Status: DISCONTINUED | OUTPATIENT
Start: 2018-04-13 | End: 2018-04-14 | Stop reason: HOSPADM

## 2018-04-12 RX ORDER — MIDAZOLAM HYDROCHLORIDE 1 MG/ML
0.5 INJECTION, SOLUTION INTRAMUSCULAR; INTRAVENOUS
Status: DISCONTINUED | OUTPATIENT
Start: 2018-04-12 | End: 2018-04-12 | Stop reason: HOSPADM

## 2018-04-12 RX ORDER — ONDANSETRON 2 MG/ML
4 INJECTION INTRAMUSCULAR; INTRAVENOUS
Status: ACTIVE | OUTPATIENT
Start: 2018-04-12 | End: 2018-04-13

## 2018-04-12 RX ORDER — SODIUM CHLORIDE, SODIUM LACTATE, POTASSIUM CHLORIDE, CALCIUM CHLORIDE 600; 310; 30; 20 MG/100ML; MG/100ML; MG/100ML; MG/100ML
INJECTION, SOLUTION INTRAVENOUS
Status: DISCONTINUED | OUTPATIENT
Start: 2018-04-12 | End: 2018-04-12 | Stop reason: HOSPADM

## 2018-04-12 RX ORDER — PROPOFOL 10 MG/ML
INJECTION, EMULSION INTRAVENOUS
Status: DISCONTINUED | OUTPATIENT
Start: 2018-04-12 | End: 2018-04-12 | Stop reason: HOSPADM

## 2018-04-12 RX ORDER — LIDOCAINE HYDROCHLORIDE 10 MG/ML
0.1 INJECTION, SOLUTION EPIDURAL; INFILTRATION; INTRACAUDAL; PERINEURAL AS NEEDED
Status: DISCONTINUED | OUTPATIENT
Start: 2018-04-12 | End: 2018-04-12 | Stop reason: HOSPADM

## 2018-04-12 RX ORDER — DEXAMETHASONE SODIUM PHOSPHATE 10 MG/ML
4 INJECTION INTRAMUSCULAR; INTRAVENOUS ONCE
Status: DISCONTINUED | OUTPATIENT
Start: 2018-04-12 | End: 2018-04-12 | Stop reason: HOSPADM

## 2018-04-12 RX ORDER — ACETAMINOPHEN 500 MG
500 TABLET ORAL
Status: DISCONTINUED | OUTPATIENT
Start: 2018-04-12 | End: 2018-04-14 | Stop reason: HOSPADM

## 2018-04-12 RX ORDER — CEFAZOLIN SODIUM IN 0.9 % NACL 2 G/100 ML
PLASTIC BAG, INJECTION (ML) INTRAVENOUS AS NEEDED
Status: DISCONTINUED | OUTPATIENT
Start: 2018-04-12 | End: 2018-04-12 | Stop reason: HOSPADM

## 2018-04-12 RX ADMIN — HYDROMORPHONE HYDROCHLORIDE 1 MG: 2 INJECTION, SOLUTION INTRAMUSCULAR; INTRAVENOUS; SUBCUTANEOUS at 10:20

## 2018-04-12 RX ADMIN — HYDROMORPHONE HYDROCHLORIDE 1 MG: 2 INJECTION, SOLUTION INTRAMUSCULAR; INTRAVENOUS; SUBCUTANEOUS at 12:43

## 2018-04-12 RX ADMIN — LIDOCAINE HYDROCHLORIDE 0.1 ML: 10 INJECTION, SOLUTION EPIDURAL; INFILTRATION; INTRACAUDAL; PERINEURAL at 08:18

## 2018-04-12 RX ADMIN — DEXAMETHASONE SODIUM PHOSPHATE 12 MG: 4 INJECTION, SOLUTION INTRA-ARTICULAR; INTRALESIONAL; INTRAMUSCULAR; INTRAVENOUS; SOFT TISSUE at 10:40

## 2018-04-12 RX ADMIN — FENTANYL CITRATE 50 MCG: 50 INJECTION, SOLUTION INTRAMUSCULAR; INTRAVENOUS at 09:25

## 2018-04-12 RX ADMIN — PROPOFOL 50 MCG/KG/MIN: 10 INJECTION, EMULSION INTRAVENOUS at 10:18

## 2018-04-12 RX ADMIN — PREGABALIN 75 MG: 75 CAPSULE ORAL at 08:29

## 2018-04-12 RX ADMIN — ACETAMINOPHEN 1000 MG: 500 TABLET, FILM COATED ORAL at 08:30

## 2018-04-12 RX ADMIN — OXYCODONE HYDROCHLORIDE 5 MG: 5 TABLET ORAL at 15:06

## 2018-04-12 RX ADMIN — ONDANSETRON 4 MG: 2 INJECTION INTRAMUSCULAR; INTRAVENOUS at 10:40

## 2018-04-12 RX ADMIN — ACETAMINOPHEN 500 MG: 500 TABLET, FILM COATED ORAL at 17:51

## 2018-04-12 RX ADMIN — OXYCODONE HYDROCHLORIDE 5 MG: 5 TABLET ORAL at 23:56

## 2018-04-12 RX ADMIN — OXYCODONE HYDROCHLORIDE 5 MG: 5 TABLET ORAL at 17:52

## 2018-04-12 RX ADMIN — SODIUM CHLORIDE, SODIUM LACTATE, POTASSIUM CHLORIDE, AND CALCIUM CHLORIDE 125 ML/HR: 600; 310; 30; 20 INJECTION, SOLUTION INTRAVENOUS at 08:19

## 2018-04-12 RX ADMIN — MIDAZOLAM HYDROCHLORIDE 3 MG: 1 INJECTION, SOLUTION INTRAMUSCULAR; INTRAVENOUS at 09:25

## 2018-04-12 RX ADMIN — Medication 3 G: at 10:29

## 2018-04-12 RX ADMIN — BUPIVACAINE HYDROCHLORIDE 13.5 MG: 5 INJECTION, SOLUTION EPIDURAL; INTRACAUDAL at 10:25

## 2018-04-12 RX ADMIN — GLYCOPYRROLATE 0.2 MG: 0.2 INJECTION INTRAMUSCULAR; INTRAVENOUS at 10:18

## 2018-04-12 RX ADMIN — WARFARIN SODIUM 4 MG: 4 TABLET ORAL at 17:51

## 2018-04-12 RX ADMIN — KETOROLAC TROMETHAMINE 30 MG: 30 INJECTION, SOLUTION INTRAMUSCULAR at 18:14

## 2018-04-12 RX ADMIN — OXYCODONE HYDROCHLORIDE 5 MG: 5 TABLET ORAL at 21:06

## 2018-04-12 RX ADMIN — ACETAMINOPHEN 500 MG: 500 TABLET, FILM COATED ORAL at 21:06

## 2018-04-12 RX ADMIN — ACETAMINOPHEN 500 MG: 500 TABLET, FILM COATED ORAL at 15:04

## 2018-04-12 RX ADMIN — CEFAZOLIN 3 G: 1 INJECTION, POWDER, FOR SOLUTION INTRAMUSCULAR; INTRAVENOUS; PARENTERAL at 17:51

## 2018-04-12 RX ADMIN — CELECOXIB 200 MG: 200 CAPSULE ORAL at 08:29

## 2018-04-12 RX ADMIN — SODIUM CHLORIDE, SODIUM LACTATE, POTASSIUM CHLORIDE, CALCIUM CHLORIDE: 600; 310; 30; 20 INJECTION, SOLUTION INTRAVENOUS at 11:02

## 2018-04-12 RX ADMIN — SODIUM CHLORIDE, SODIUM LACTATE, POTASSIUM CHLORIDE, CALCIUM CHLORIDE: 600; 310; 30; 20 INJECTION, SOLUTION INTRAVENOUS at 12:57

## 2018-04-12 RX ADMIN — MIDAZOLAM HYDROCHLORIDE 2 MG: 1 INJECTION, SOLUTION INTRAMUSCULAR; INTRAVENOUS at 10:13

## 2018-04-12 RX ADMIN — SODIUM CHLORIDE 125 ML/HR: 900 INJECTION, SOLUTION INTRAVENOUS at 15:06

## 2018-04-12 RX ADMIN — SODIUM CHLORIDE, SODIUM LACTATE, POTASSIUM CHLORIDE, CALCIUM CHLORIDE: 600; 310; 30; 20 INJECTION, SOLUTION INTRAVENOUS at 09:13

## 2018-04-12 RX ADMIN — KETOROLAC TROMETHAMINE 30 MG: 30 INJECTION, SOLUTION INTRAMUSCULAR at 23:57

## 2018-04-12 NOTE — PERIOP NOTES
TRANSFER - OUT REPORT:    Verbal report given to FEMI PINON(name) on Corina aMrino  being transferred to 56(unit) for routine post - op       Report consisted of patients Situation, Background, Assessment and   Recommendations(SBAR). Time Pre op antibiotic given:1029  Anesthesia Stop time: 8723  Adams Present on Transfer to floor:yes  Order for Adams on Chart:yes  Discharge Prescriptions with Chart:no    Information from the following report(s) SBAR, Procedure Summary, Intake/Output and MAR was reviewed with the receiving nurse. Opportunity for questions and clarification was provided. Is the patient on 02? NO       L/Min n/a       Other n/a    Is the patient on a monitor? NO    Is the nurse transporting with the patient? NO    Surgical Waiting Area notified of patient's transfer from PACU?  YES      The following personal items collected during your admission accompanied patient upon transfer:   Dental Appliance: Dental Appliances: None  Vision:    Hearing Aid:    Jewelry:    Clothing: Clothing:  (clothing bag to pacu)  Other Valuables:    Valuables sent to safe:

## 2018-04-12 NOTE — IP AVS SNAPSHOT
[address] [facility] Marta Miles 13 
[phone] Patient: Anne Heard MRN: [id number] :1965 About your hospitalization You were admitted on:  2018 [facility] on:  2018 Why you were hospitalized Your primary diagnosis was:  Not on File Your diagnoses also included:  Primary Localized Osteoarthritis Of Right Knee Follow-up Information Follow up With Details Comments Contact Info [address] [place] [address]  For home health skilled nursing and physical therapy. [address] [address] 
[phone] Gerre Duane, PA-C Discharge Orders None A check ly indicates which time of day the medication should be taken. My Medications START taking these medications Instructions Each Dose to Equal  
 Morning Noon Evening Bedtime  
 oxyCODONE IR 5 mg immediate release tablet Commonly known as:  Andrea Tana Your last dose was: Your next dose is: Take 1-2 Tabs by mouth every six (6) hours as needed. Max Daily Amount: 40 mg. Indications: Pain 5-10 mg  
    
   
   
   
  
 senna-docusate 8.6-50 mg per tablet Commonly known as:  Hanley Erica Your last dose was: Your next dose is: Take 1 Tab by mouth two (2) times a day. Take with full glass of fluid. Do not take if having frequent or loose BMs. Obtain over-the-counter. Indications: Prevention of Postop Constipation 1 Tab  
    
   
   
   
  
 warfarin 2 mg tablet Commonly known as:  COUMADIN Your last dose was: Your next dose is: Take 3 mg (one and half tabs) by mouth daily at 6pm. Adjust dose as directed. Indications: DEEP VEIN THROMBOSIS PREVENTION  
     
   
   
   
  
  
CHANGE how you take these medications  Instructions Each Dose to Equal  
 Morning Noon Evening Bedtime  
 acetaminophen 500 mg tablet Commonly known as:  TYLENOL What changed:  additional instructions Your last dose was: Your next dose is: Take 1 Tab by mouth every four (4) hours (while awake). Schedule for pain control over the next 7-14 days. Do not exceed 4000 mg in 24 hours. Obtain over-the-counter. Indications: Postop Incisional Knee Pain 500 mg  
    
   
   
   
  
 phenytoin 300 mg ER capsule Commonly known as:  DILANTIN ER What changed:  Another medication with the same name was removed. Continue taking this medication, and follow the directions you see here. Your last dose was: Your next dose is: Take 300 mg by mouth every morning. 300 mg CONTINUE taking these medications Instructions Each Dose to Equal  
 Morning Noon Evening Bedtime  
 diclofenac EC 75 mg EC tablet Commonly known as:  VOLTAREN Your last dose was: Your next dose is: Take 75 mg by mouth two (2) times a day. 75 mg  
    
   
   
   
  
 furosemide 20 mg tablet Commonly known as:  LASIX Your last dose was: Your next dose is: Take 20 mg by mouth daily. 20 mg  
    
   
   
   
  
  
STOP taking these medications   
 ibuprofen 200 mg tablet Commonly known as:  MOTRIN Where to Get Your Medications Information on where to get these meds will be given to you by the nurse or doctor. ! Ask your nurse or doctor about these medications  
  acetaminophen 500 mg tablet  
 oxyCODONE IR 5 mg immediate release tablet  
 senna-docusate 8.6-50 mg per tablet  
 warfarin 2 mg tablet Opioid Education  Prescription Opioids: What You Need to Know: 
 
Prescription opioids can be used to help relieve moderate-to-severe pain and are often prescribed following a surgery or injury, or for certain health conditions. These medications can be an important part of treatment but also come with serious risks. Opioids are strong pain medicines. Examples include hydrocodone, oxycodone, fentanyl, and morphine. Heroin is an example of an illegal opioid. It is important to work with your health care provider to make sure you are getting the safest, most effective care. WHAT ARE THE RISKS AND SIDE EFFECTS OF OPIOID USE? Prescription opioids carry serious risks of addiction and overdose, especially with prolonged use. An opioid overdose, often marked by slow breathing, can cause sudden death. The use of prescription opioids can have a number of side effects as well, even when taken as directed. · Tolerance-meaning you might need to take more of a medication for the same pain relief · Physical dependence-meaning you have symptoms of withdrawal when the medication is stopped. Withdrawal symptoms can include nausea, sweating, chills, diarrhea, stomach cramps, and muscle aches. Withdrawal can last up to several weeks, depending on which drug you took and how long you took it. · Increased sensitivity to pain · Constipation · Nausea, vomiting, and dry mouth · Sleepiness and dizziness · Confusion · Depression · Low levels of testosterone that can result in lower sex drive, energy, and strength · Itching and sweating RISKS ARE GREATER WITH:      
· History of drug misuse, substance use disorder, or overdose · Mental health conditions (such as depression or anxiety) · Sleep apnea · Older age (72 years or older) · Pregnancy Avoid alcohol while taking prescription opioids. Also, unless specifically advised by your health care provider, medications to avoid include: · Benzodiazepines (such as Xanax or Valium) · Muscle relaxants (such as Soma or Flexeril) · Hypnotics (such as Ambien or Lunesta) · Other prescription opioids KNOW YOUR OPTIONS Talk to your health care provider about ways to manage your pain that don't involve prescription opioids. Some of these options may actually work better and have fewer risks and side effects. Options may include: 
· Pain relievers such as acetaminophen, ibuprofen, and naproxen · Some medications that are also used for depression or seizures · Physical therapy and exercise · Counseling to help patients learn how to cope better with triggers of pain and stress. · Application of heat or cold compress · Massage therapy · Relaxation techniques Be Informed Make sure you know the name of your medication, how much and how often to take it, and its potential risks & side effects. IF YOU ARE PRESCRIBED OPIOIDS FOR PAIN: 
· Never take opioids in greater amounts or more often than prescribed. Remember the goal is not to be pain-free but to manage your pain at a tolerable level. · Follow up with your primary care provider to: · Work together to create a plan on how to manage your pain. · Talk about ways to help manage your pain that don't involve prescription opioids. · Talk about any and all concerns and side effects. · Help prevent misuse and abuse. · Never sell or share prescription opioids · Help prevent misuse and abuse. · Store prescription opioids in a secure place and out of reach of others (this may include visitors, children, friends, and family). · Safely dispose of unused/unwanted prescription opioids: Find your community drug take-back program or your pharmacy mail-back program, or flush them down the toilet, following guidance from the Food and Drug Administration (www.fda.gov/Drugs/ResourcesForYou). · Visit www.cdc.gov/drugoverdose to learn about the risks of opioid abuse and overdose. · If you believe you may be struggling with addiction, tell your health care provider and ask for guidance or call [facility] at 2-486-826-DEKE. Discharge Instructions After Hospital Care Plan:  Discharge Instructions Knee Replacement- Dr. Harry Olsen Patient Name: Tim Singh Date of procedure: 4/12/2018 Procedure: Procedure(s): RIGHT TOTAL KNEE ARTHROPLASTY Surgeon: Surgeon(s) and Role: Katelynn Anaya MD - Primary PCP: Deanna Martínez PA-C Date of discharge: 4/14/18 Follow up appointments ? Follow up with Dr. Harry Olsen in 3 weeks. Call [phone] to make an appointment. ? If home health has been arranged for you the agency will contact you to arrange dates/times for visits. Please call them if you do not hear from them within 24 hours after you are discharged When to call your Orthopaedic Surgeon: Call [phone]. If you call after 5pm or on a weekend, the on call physician will be contacted ? Unrelieved pain ? Signs of infection-if your incision is red; continues to have drainage; drainage has a foul odor or if you have a persistent fever over 101 degrees ? Signs of a blood clot in your leg-calf pain, tenderness, redness, swelling of lower leg When to call your Primary Care Physician: 
? Concerns about medical conditions such as diabetes, high blood pressure, asthma, congestive heart failure ? Call if blood sugars are elevated, persistent headache or dizziness, coughing or congestion, constipation or diarrhea, burning with urination, abnormal heart rate When to call [id number] go to the nearest emergency room ? Acute onset of chest pain, shortness of breath, difficulty breathing Activity ? Weight bearing as tolerated with walker or crutches. Refer to pages 23-31 of your handbook for instructions and pictures ? Complete your Home Exercise Program daily as instructed by your therapist.  Refer to pages 33-41 of your handbook for instructions and pictures ? Get up every one hour and walk (except at night when sleeping) ? Do not drive or operate heavy machinery Incision Care ? The Aquacel (brown, waterproof) surgical dressing is to remain on your knee for 7 days. On the 7th day have someone gently peel the dressing off by carefully lifting the edge and stretching it slightly to break the adhesive seal 
? If you have steri-strips (small, white pieces of tape) on your incision, they may come off when you remove the Aquacel surgical dressing. This is okay. You may now leave your incision open to air ? If your Aquacel dressing comes loose/off before the 7th day, you may replace it with a dry sterile gauze dressing; change it daily. Once you incision is not draining, you may leave it open to air ? You may take a shower with the Aquacel dressing in place. Once the Aquacel is removed, you may shower and get your incision wet but do not submerge your incision under water in a bath tub, hot tub or swimming pool for 6 weeks after surgery. Preventing blood clots ? Take Coumadin (warfarin) as prescribed by Dr. Vaughn Sheriff for one month following surgery Pain management ? Take pain medication as prescribed; decrease the amount you use as your pain lessens ? Avoid alcoholic beverages while taking pain medication ? Please be aware that many medications contain Tylenol (acetaminophen). We do not want you to over medicate so please read the information below as a guide. Do not take more than 4 Grams of Tylenol in a 24 hour period. (There are 1000 milligrams in one Gram) 
o 325 mg of Tylenol per tablet (do not take more than 12 tablets in 24 hours) 
o 500 mg of Tylenol per tablet (do not take more than 8 tablets in 24 hours) ? Elevate you leg (do not bend/flex knee) and place ice bags on your knee for 15-20 minutes after exercising and as needed for comfort Diet ? Resume usual diet; drink plenty of fluids; eat foods high in fiber ?  You may want to take a stool softener (such as Senokot-S or Colace) to prevent constipation while you are taking pain medication. If constipation occurs, take a laxative (such as Dulcolax tablets, Milk of Magnesia, or a suppository) Home Health Care Protocol (to be followed by [facility] [place] patricia) Nursing-per physicians order ? Draw a PT/INR per physicians order and call results to Dr. Rebeka Jean at [phone] ? Complete head to toe assessment, vital signs ? Medication reconciliation ? Review pain management ? Manage chronic medical conditions Physical Therapy-per physicians order Weight bearing status: 
Precautions at Admission: Fall, WBAT Right Side Weight Bearing: As tolerated Mobility Status: 
Supine to Sit: Contact guard assistance Sit to Stand: Contact guard assistance Sit to Supine: Contact guard assistance Gait: 
Distance (ft): 125 Feet (ft) Ambulation - Level of Assistance: Contact guard assistance Assistive Device: Gait belt, Walker, rolling Gait Abnormalities: Antalgic, Decreased step clearance, Step to gait ADL status overall composite: 
  
  
  
  
 
Physical Therapy ? Assessment and evaluation-bed mobility; functional transfers (bed, chair, bathroom, stairs); ambulation with equipment, car transfers, shower transfers, safety and ability to get out of house in the event of an emergency ? Review weight bearing as tolerated, wean from walker or crutches as tolerated ? Discuss pain management ? Review how to do ADLs. Refer to page 42 of patient handbook Home Exercise Program-refer to pages 33-41 of patient handbook for exercises. [employer] Announcement We are excited to announce that we are making your provider's discharge notes available to you in [employer] You will see these notes when they are completed and signed by the physician that discharged you from your recent hospital stay.   If you have any questions or concerns about any information you see in [employer] please call the [employer] Information Department where you were seen or reach out to your Primary Care Provider for more information about your plan of care. Introducing [facility]! Dear Connie Kline: 
Thank you for requesting a [employer] account. Our records indicate that you already have an active [employer] account. You can access your account anytime at [email or web address] Did you know that you can access your hospital and ER discharge instructions at any time in [employer]? You can also review all of your test results from your hospital stay or ER visit. Additional Information If you have questions, please visit the Frequently Asked Questions section of the [employer] website at [email or web address] Remember, [employer] is NOT to be used for urgent needs. For medical emergencies, dial 911. Now available from your iPhone and Android! Introducing [employer] As a [facility] patient, I wanted to make you aware of our electronic visit tool called [employer]. [facility] 24/7 allows you to connect within minutes with a medical provider 24 hours a day, seven days a week via a mobile device or tablet or logging into a secure website from your computer. You can access Hao Evans from anywhere in the [place]. A virtual visit might be right for you when you have a simple condition and feel like you just dont want to get out of bed, or cant get away from work for an appointment, when your regular [facility] provider is not available (evenings, weekends or holidays), or when youre out of town and need minor care. Electronic visits cost only $49 and if the [facility] 24/7 provider determines a prescription is needed to treat your condition, one can be electronically transmitted to a nearby pharmacy*. Please take a moment to enroll today if you have not already done so. The enrollment process is free and takes just a few minutes.   To enroll, please download the [employer] 24/7 shane to your tablet or phone, or visit [email or web address] to enroll on your computer. And, as an [address] [place] [address] patient with a [employer] account, the results of your visits will be scanned into your electronic medical record and your primary care provider will be able to view the scanned results. We urge you to continue to see your regular [employer] provider for your ongoing medical care. And while your primary care provider may not be the one available when you seek a [employer] virtual visit, the peace of mind you get from getting a real diagnosis real time can be priceless. For more information on [employer], view our Frequently Asked Questions (FAQs) at [email or web address]. Sincerely, 
 
Charlotte Peters MD 
Chief Medical Officer [employer] *:  certain medications cannot be prescribed via [employer] Providers Seen During Your Hospitalization Provider Specialty Primary office phone Adithya Winston MD Orthopedic Surgery [phone] Your Primary Care Physician (PCP) Primary Care Physician Office Phone Office Fax [facility] ** None ** ** None ** You are allergic to the following No active allergies Recent Documentation Height Weight BMI OB Status Smoking Status 1.715 m 133.8 kg 45.52 kg/m2 Postmenopausal Never Smoker Emergency Contacts Name Discharge Info Relation Home Work Mobile [address] CAREGIVER [3] Spouse [3] [phone] Patient Belongings The following personal items are in your possession at time of discharge: 
  Dental Appliances: None  Visual Aid: Glasses             Clothing:  (clothing bag to pacu) Please provide this summary of care documentation to your next provider.  
  
  
 
  
Signatures-by signing, you are acknowledging that this After Visit Summary has been reviewed with you and you have received a copy. Patient Signature:  ____________________________________________________________ Date:  ____________________________________________________________  
  
Edrie Gunnels Provider Signature:  ____________________________________________________________ Date:  ____________________________________________________________

## 2018-04-12 NOTE — ANESTHESIA POSTPROCEDURE EVALUATION
Post-Anesthesia Evaluation and Assessment    Patient: Guerita Kearney MRN: [id number]  SSN: [id number]    YOB: 1965  Age: 45 y.o. Sex: female       Cardiovascular Function/Vital Signs  Visit Vitals    /78    Pulse 68    Temp 36.4 °C (97.6 °F)    Resp 14    Ht 5' 7.5\" (1.715 m)    Wt 133.8 kg (294 lb 15.6 oz)    SpO2 96%    BMI 45.52 kg/m2       Patient is status post general anesthesia for Procedure(s):  RIGHT TOTAL KNEE ARTHROPLASTY. Nausea/Vomiting: None    Postoperative hydration reviewed and adequate. Pain:  Pain Scale 1: Numeric (0 - 10) (04/12/18 1505)  Pain Intensity 1: 5 (04/12/18 1505)   Managed    Neurological Status:   Neuro (WDL):  (spinal ) (04/12/18 1344)  Neuro  LUE Motor Response: Purposeful (04/12/18 1344)  RUE Motor Response: Purposeful (04/12/18 1344)   At baseline    Mental Status and Level of Consciousness: Arousable    Pulmonary Status:   O2 Device: Room air (04/12/18 1345)   Adequate oxygenation and airway patent    Complications related to anesthesia: None    Post-anesthesia assessment completed.  No concerns    Signed By: Nusrat Martin MD     April 12, 2018

## 2018-04-12 NOTE — ANESTHESIA PROCEDURE NOTES
Peripheral Block    Start time: 4/12/2018 9:45 AM  End time: 4/12/2018 9:55 AM  Performed by: Adrian Salmeron  Authorized by: Adrian Salmeron       Pre-procedure: Indications: at surgeon's request and post-op pain management    Preanesthetic Checklist: patient identified, risks and benefits discussed, site marked, timeout performed, anesthesia consent given and patient being monitored    Timeout Time: 09:46          Block Type:   Block Type:   Adductor canal  Laterality:  Right  Monitoring:  Standard ASA monitoring, continuous pulse ox, frequent vital sign checks, heart rate, responsive to questions and oxygen  Injection Technique:  Single shot  Procedures: ultrasound guided    Patient Position: supine  Prep: DuraPrep    Location:  Mid thigh  Needle Type:  Stimuplex  Needle Gauge:  22 G  Needle Localization:  Ultrasound guidance  Medication Injected:  0.5%  ropivacaine  Volume (mL):  25    Assessment:  Number of attempts:  1  Injection Assessment:  Incremental injection every 5 mL, local visualized surrounding nerve on ultrasound, negative aspiration for blood, no paresthesia, no intravascular symptoms, negative aspiration for CSF and ultrasound image on chart  Patient tolerance:  Patient tolerated the procedure well with no immediate complications

## 2018-04-12 NOTE — IP AVS SNAPSHOT
[address] [facility] [address] 
[phone] Patient: Hetal Boyce MRN: [id number] :1965 A check ly indicates which time of day the medication should be taken. My Medications START taking these medications Instructions Each Dose to Equal  
 Morning Noon Evening Bedtime  
 oxyCODONE IR 5 mg immediate release tablet Commonly known as:  Mike Nao Your last dose was: Your next dose is: Take 1-2 Tabs by mouth every six (6) hours as needed. Max Daily Amount: 40 mg. Indications: Pain 5-10 mg  
    
   
   
   
  
 senna-docusate 8.6-50 mg per tablet Commonly known as:  Armani Massey Your last dose was: Your next dose is: Take 1 Tab by mouth two (2) times a day. Take with full glass of fluid. Do not take if having frequent or loose BMs. Obtain over-the-counter. Indications: Prevention of Postop Constipation 1 Tab  
    
   
   
   
  
 warfarin 2 mg tablet Commonly known as:  COUMADIN Your last dose was: Your next dose is: Take 3 mg (one and half tabs) by mouth daily at 6pm. Adjust dose as directed. Indications: DEEP VEIN THROMBOSIS PREVENTION  
     
   
   
   
  
  
CHANGE how you take these medications Instructions Each Dose to Equal  
 Morning Noon Evening Bedtime  
 acetaminophen 500 mg tablet Commonly known as:  TYLENOL What changed:  additional instructions Your last dose was: Your next dose is: Take 1 Tab by mouth every four (4) hours (while awake). Schedule for pain control over the next 7-14 days. Do not exceed 4000 mg in 24 hours. Obtain over-the-counter. Indications: Postop Incisional Knee Pain 500 mg  
    
   
   
   
  
 phenytoin 300 mg ER capsule Commonly known as:  DILANTIN ER What changed:  Another medication with the same name was removed.  Continue taking this medication, and follow the directions you see here. Your last dose was: Your next dose is: Take 300 mg by mouth every morning. 300 mg CONTINUE taking these medications Instructions Each Dose to Equal  
 Morning Noon Evening Bedtime  
 diclofenac EC 75 mg EC tablet Commonly known as:  VOLTAREN Your last dose was: Your next dose is: Take 75 mg by mouth two (2) times a day. 75 mg  
    
   
   
   
  
 furosemide 20 mg tablet Commonly known as:  LASIX Your last dose was: Your next dose is: Take 20 mg by mouth daily. 20 mg  
    
   
   
   
  
  
STOP taking these medications   
 ibuprofen 200 mg tablet Commonly known as:  MOTRIN Where to Get Your Medications Information on where to get these meds will be given to you by the nurse or doctor. ! Ask your nurse or doctor about these medications  
  acetaminophen 500 mg tablet  
 oxyCODONE IR 5 mg immediate release tablet  
 senna-docusate 8.6-50 mg per tablet  
 warfarin 2 mg tablet

## 2018-04-12 NOTE — ANESTHESIA PROCEDURE NOTES
Spinal Block    Start time: 4/12/2018 10:17 AM  End time: 4/12/2018 10:26 AM  Performed by: Carolina Hanna  Authorized by: Carolina Hanna     Pre-procedure:   Indications: primary anesthetic  Preanesthetic Checklist: patient identified, risks and benefits discussed, anesthesia consent, site marked, patient being monitored and timeout performed    Timeout Time: 10:17          Spinal Block:   Patient Position:  Seated  Prep Region:  Lumbar  Prep: Betadine      Location:  L4-5  Technique:  Single shot  Local:  Lidocaine 1%  Local Dose (mL):  2.5    Needle:   Needle Type:  Pencil-tip  Needle Gauge:  25 G  Attempts:  1      Events: CSF confirmed, no blood with aspiration and no paresthesia        Assessment:  Insertion:  Uncomplicated  Patient tolerance:  Patient tolerated the procedure well with no immediate complications

## 2018-04-12 NOTE — PROGRESS NOTES
TRANSFER - IN REPORT:    Verbal report received from Dianne waggoner RN (name) on Chary Graf  being received from PACU (unit) for routine post - op      Report consisted of patients Situation, Background, Assessment and   Recommendations(SBAR). Information from the following report(s) SBAR was reviewed with the receiving nurse. Opportunity for questions and clarification was provided. Assessment completed upon patients arrival to unit and care assumed.

## 2018-04-12 NOTE — PROGRESS NOTES
Problem: Falls - Risk of  Goal: *Absence of Falls  Document Meg Fall Risk and appropriate interventions in the flowsheet.    Outcome: Progressing Towards Goal  Fall Risk Interventions:  Mobility Interventions: Patient to call before getting OOB         Medication Interventions: Patient to call before getting OOB    Elimination Interventions: Patient to call for help with toileting needs

## 2018-04-12 NOTE — PROGRESS NOTES
04/12/18 1057   Family Communication   Family Update Message Procedure started   Delivery Origin Nurse  Kevin Corley)    Relationship to Patient Spouse  (\"Everette\")    Phone Number ex [phone]   Family/Significant Other Update Called

## 2018-04-12 NOTE — PROGRESS NOTES
Pharmacist Note - Warfarin Dosing  Consult provided for this 45 y.o. female to manage warfarin for DVT prophylaxis    INR Goal: 1.7- 2.2    Therapy Day: 1    Preop Dose: Newton- 4mg    Drugs that may increase INR: None  Drugs that may decrease INR: None  Other current anticoagulants/ drugs that may increase bleeding risk: NSAID  Risk factors: None  Daily INR ordered: YES    No results for input(s): HGB, INR, HGBEXT, HGBEXT in the last 72 hours. No lab exists for component: INREXT, INREXT    Date               INR                 Dose  4/12  ----  4 mg     Assessment/ Plan: Will order warfarin 4 mg PO x 1 dose. Pharmacy will continue to monitor daily and adjust therapy as indicated.      Marv Hall, PharmD

## 2018-04-12 NOTE — BRIEF OP NOTE
BRIEF OPERATIVE NOTE    Date of Procedure: 4/12/2018   Preoperative Diagnosis: PRIMARY LOCALIZED OSTEOATHRITIS RIGHT KNEE  Postoperative Diagnosis: PRIMARY LOCALIZED OSTEOATHRITIS RIGHT KNEE    Procedure(s):  RIGHT TOTAL KNEE ARTHROPLASTY  Surgeon(s) and Role:     * Mary Gutierrez MD - Primary         Surgical Assistant: Jennifer Saenz SA    Surgical Staff:  Circ-1: Hood Laguna RN  Circ-Relief: Arvind Smiley RN  Circ-Intern: Nolberto Harrington  Scrub Tech-1: Jennifer Jesus  Retractor Godfrey: Alberto Sahu  Surg Asst-1: Buzz Culp  Event Time In   Incision Start 1056   Incision Close      Anesthesia: Spinal   Estimated Blood Loss: 100  Specimens: * No specimens in log *   Findings: severe djd   Complications: none  Implants:   Implant Name Type Inv.  Item Serial No.  Lot No. LRB No. Used Action   CEMENT BNE GENTAMC MV 40GM -- SMARTSET ENDURANCE - SN/A  CEMENT BNE GENTAMC MV 40GM -- SMARTSET ENDURANCE N/A [facility] [employer] [id number] Right 2 Implanted   FEM PS KENA CCR NRW SZ 11 RT -- PERSONA - SN/A  FEM PS KENA CCR NRW SZ 11 RT -- PERSONA N/A [employer] [id number] Right 1 Implanted   INSERT ASF PS 12MM R 10-11 EF -- PERSONA VE - SN/A  INSERT ASF PS 12MM R 10-11 EF -- PERSONA VE N/A [employer] [id number] Right 1 Implanted   TIB PRN NP STM 5 DEG SZ FR -- PERSONA - SN/A  TIB PRN NP STM 5 DEG SZ FR -- PERSONA N/A [employer] [id number] Right 1 Implanted   STEM KNE EXT TPR KENA [id number] -- PERSONA - SN/A  STEM KNE EXT TPR KENA [id number] -- PERSONA N/A [employer] [id number] Right 1 Implanted   INSERT ALL POLY PAT PLY 35MM -- PERSONA - SN/A   INSERT ALL POLY PAT PLY 35MM -- PERSONA N/A [employer] [id number] Right 1 Implanted

## 2018-04-12 NOTE — PERIOP NOTES
1858: RT leg adductor nerve block done by Dr Ezzard Seip using 25cc of 0.5% ropivicaine with US guidance, with pt monitored, O2 @ 2l/m/nc and IV sedation. Pt tolerated procedure well. VSS post procedure: 095/20-12-29, SaO2=96% on 2l/m/nc. Awake and talkative. See anesthesia note.

## 2018-04-12 NOTE — PROGRESS NOTES
Problem: Mobility Impaired (Adult and Pediatric)  Goal: *Acute Goals and Plan of Care (Insert Text)  Physical Therapy Goals  Initiated 4/12/2018    1. Patient will move from supine to sit and sit to supine  in bed with modified independence within 4 days. 2. Patient will perform sit to stand with modified independence within 4 days. 3. Patient will ambulate with modified independence for 200 feet with the least restrictive device within 4 days. 4. Patient will ascend/descend 3 stairs with 1 handrail(s) with modified independence within 4 days. 5. Patient will perform home exercise program per protocol with modified independence within 4 days. 6. Patient will demonstrate AROM 0-90 degrees in operative joint within 4 days. physical Therapy knee EVALUATION  Patient: John Daniel (51 y.o. female)  Date: 4/12/2018  Primary Diagnosis: PRIMARY LOCALIZED OSTEOATHRITIS RIGHT KNEE  Primary localized osteoarthritis of right knee  Procedure(s) (LRB):  RIGHT TOTAL KNEE ARTHROPLASTY (Right) Day of Surgery   Precautions:   Fall, WBAT    ASSESSMENT :  Based on the objective data described below, the patient presents with decreased functional mobility from baseline level of function. Prior to admit patient was independent with mobility. Lives with  in a 2 level home with 3 TY and can stay on the first floor. Patient currently with high pain levels but is agreeable to PT. Supine to sit with Franklyn x 1 and cues for technique. Sit to stand with Franklyn and cues for technique. Amb approx 12 feet with Rw and CGA. BP initially stable and with increased ambulation reported some nausea and feeling weak. Returned to supine with CGA and cues for technique. Anticipate BP had dropped and she states that this has happened with her past surgeries. Anticipate patient will progress well. Recommend  PT to RI and she already owns RW. Patient will benefit from skilled intervention to address the above impairments.   Patients rehabilitation potential is considered to be Good  Factors which may influence rehabilitation potential include:   [x]         None noted  []         Mental ability/status  []         Medical condition  []         Home/family situation and support systems  []         Safety awareness  []         Pain tolerance/management  []         Other:      PLAN :  Recommendations and Planned Interventions:  [x]           Bed Mobility Training             []    Neuromuscular Re-Education  [x]           Transfer Training                   []    Orthotic/Prosthetic Training  [x]           Gait Training                         []    Modalities  [x]           Therapeutic Exercises           []    Edema Management/Control  [x]           Therapeutic Activities            [x]    Patient and Family Training/Education  []           Other (comment):    Frequency/Duration: Patient will be followed by physical therapy twice daily to address goals. Discharge Recommendations: Home Health  Further Equipment Recommendations for Discharge: TBD     SUBJECTIVE:   Patient stated I have had low BP during my previous surgeries.     OBJECTIVE DATA SUMMARY:   HISTORY:    Past Medical History:   Diagnosis Date    Arthritis     Chronic pain     bilateral hips and right knee    Motion sickness     when not eating    Seizures ([facility] [address]     since age 1 last seizure 2006     Past Surgical History:   Procedure Laterality Date    HX BREAST BIOPSY Left     NEG    HX  SECTION      x 2    HX HEENT      LASIK    HX KNEE ARTHROSCOPY Bilateral     HX ORTHOPAEDIC Left     TKR    HX ORTHOPAEDIC Left     [address]    HX ORTHOPAEDIC Right     RTH    HX OTHER SURGICAL      BIOPSY LEFT LOWER LEG(AGE 23)    HX TONSILLECTOMY      HX TUBAL LIGATION       Prior Level of Function/Home Situation: Independent with functional mobility  Personal factors and/or comorbidities impacting plan of care:     Home Situation  Home Environment: Private residence  # Steps to Enter: 4  One/Two Story Residence: Two story, live on 1st floor  # of Interior Steps: 12  Living Alone: No  Support Systems: Spouse/Significant Other/Partner  Patient Expects to be Discharged to[de-identified] Private residence  Current DME Used/Available at Home: Cane, straight, Raised toilet seat, Walker, rolling    EXAMINATION/PRESENTATION/DECISION MAKING:   Critical Behavior:  Neurologic State: Alert  Orientation Level: Oriented X4       Range Of Motion:  AROM: Generally decreased, functional                       Strength:    Strength: Generally decreased, functional     Functional Mobility:  Bed Mobility:     Supine to Sit: Minimum assistance  Sit to Supine: Minimum assistance  Scooting: Supervision  Transfers:  Sit to Stand: Minimum assistance  Stand to Sit: Contact guard assistance;Assist x2                       Balance:   Sitting: Intact  Standing: Intact; With support  Ambulation/Gait Training:  Distance (ft): 12 Feet (ft)  Assistive Device: Gait belt;Walker, rolling  Ambulation - Level of Assistance: Contact guard assistance        Gait Abnormalities: Antalgic;Decreased step clearance; Step to gait  Right Side Weight Bearing: As tolerated     Base of Support: Widened  Stance: Right decreased  Speed/Mary Kay: Pace decreased (<100 feet/min); Slow  Step Length: Left shortened;Right shortened       Functional Measure:  Barthel Index:    Bathin  Bladder: 0  Bowels: 10  Groomin  Dressin  Feeding: 10  Mobility: 0  Stairs: 0  Toilet Use: 0  Transfer (Bed to Chair and Back): 10  Total: 40       Barthel and G-code impairment scale:  Percentage of impairment CH  0% CI  1-19% CJ  20-39% CK  40-59% CL  60-79% CM  80-99% CN  100%   Barthel Score 0-100 100 99-80 79-60 59-40 20-39 1-19   0   Barthel Score 0-20 20 17-19 13-16 9-12 5-8 1-4 0      The Barthel ADL Index: Guidelines  1. The index should be used as a record of what a patient does, not as a record of what a patient could do.   2. The main aim is to establish degree of independence from any help, physical or verbal, however minor and for whatever reason. 3. The need for supervision renders the patient not independent. 4. A patient's performance should be established using the best available evidence. Asking the patient, friends/relatives and nurses are the usual sources, but direct observation and common sense are also important. However direct testing is not needed. 5. Usually the patient's performance over the preceding 24-48 hours is important, but occasionally longer periods will be relevant. 6. Middle categories imply that the patient supplies over 50 per cent of the effort. 7. Use of aids to be independent is allowed. Fátima Holliday., Barthel, DZenaidaW. (8863). Functional evaluation: the Barthel Index. [address] [facility] (14)2. Lula Libman der [place], J.J.M.F, Xiomara Ac., Maddy Clifford., Alexis, [address] (1999). Measuring the change indisability after inpatient rehabilitation; comparison of the responsiveness of the Barthel Index and Functional Carroll Measure. Journal of Neurology, Neurosurgery, and Psychiatry, 66(4), 436-575. Carl Zepeda, N.J.A, MARI Estevez, & Susan Magallon, M.A. (2004.) Assessment of post-stroke quality of life in cost-effectiveness studies: The usefulness of the Barthel Index and the EuroQoL-5D. Quality of Life Research, 13, 784-13         G codes: In compliance with CMSs Claims Based Outcome Reporting, the following G-code set was chosen for this patient based on their primary functional limitation being treated: The outcome measure chosen to determine the severity of the functional limitation was the Barthel with a score of 40/100 which was correlated with the impairment scale.     ? Mobility - Walking and Moving Around:     - CURRENT STATUS: CK - 40%-59% impaired, limited or restricted    - GOAL STATUS: CJ - 20%-39% impaired, limited or restricted    - D/C STATUS:  ---------------To be determined---------------    Pain:  Pain Scale 1: Numeric (0 - 10)  Pain Intensity 1: 5  Pain Location 1: Knee  Pain Orientation 1: Right  Pain Description 1: Sharp  Pain Intervention(s) 1: Medication (see MAR)  Activity Tolerance:   VSS  Please refer to the flowsheet for vital signs taken during this treatment. After treatment:   []         Patient left in no apparent distress sitting up in chair  [x]         Patient left in no apparent distress in bed  [x]         Call bell left within reach  [x]         Nursing notified  [x]         Caregiver present  []         Bed alarm activated    COMMUNICATION/EDUCATION:   The patients plan of care was discussed with: Physical Therapist, Occupational Therapist and Registered Nurse. [x]         Fall prevention education was provided and the patient/caregiver indicated understanding. [x]         Patient/family have participated as able in goal setting and plan of care. [x]         Patient/family agree to work toward stated goals and plan of care. []         Patient understands intent and goals of therapy, but is neutral about his/her participation. []         Patient is unable to participate in goal setting and plan of care.     Thank you for this referral.  Freda Ontiveros, PT, DPT   Time Calculation: 17 mins

## 2018-04-12 NOTE — ANESTHESIA PREPROCEDURE EVALUATION
Anesthetic History   No history of anesthetic complications            Review of Systems / Medical History  Patient summary reviewed, nursing notes reviewed and pertinent labs reviewed    Pulmonary  Within defined limits                 Neuro/Psych     seizures         Cardiovascular  Within defined limits                Exercise tolerance: >4 METS     GI/Hepatic/Renal  Within defined limits              Endo/Other        Obesity and arthritis     Other Findings              Physical Exam    Airway  Mallampati: II  TM Distance: > 6 cm  Neck ROM: normal range of motion   Mouth opening: Normal     Cardiovascular  Regular rate and rhythm,  S1 and S2 normal,  no murmur, click, rub, or gallop             Dental  No notable dental hx       Pulmonary  Breath sounds clear to auscultation               Abdominal  GI exam deferred       Other Findings            Anesthetic Plan    ASA: 2  Anesthesia type: general          Induction: Intravenous  Anesthetic plan and risks discussed with: Patient

## 2018-04-13 LAB
ANION GAP SERPL CALC-SCNC: 5 MMOL/L (ref 5–15)
BUN SERPL-MCNC: 17 MG/DL (ref 6–20)
BUN/CREAT SERPL: 27 (ref 12–20)
CALCIUM SERPL-MCNC: 7.9 MG/DL (ref 8.5–10.1)
CHLORIDE SERPL-SCNC: 108 MMOL/L (ref 97–108)
CO2 SERPL-SCNC: 26 MMOL/L (ref 21–32)
CREAT SERPL-MCNC: 0.64 MG/DL (ref 0.55–1.02)
GLUCOSE SERPL-MCNC: 105 MG/DL (ref 65–100)
HGB BLD-MCNC: 10.7 G/DL (ref 11.5–16)
INR PPP: 1.1 (ref 0.9–1.1)
POTASSIUM SERPL-SCNC: 3.8 MMOL/L (ref 3.5–5.1)
PROTHROMBIN TIME: 11.5 SEC (ref 9–11.1)
SODIUM SERPL-SCNC: 139 MMOL/L (ref 136–145)

## 2018-04-13 PROCEDURE — 85018 HEMOGLOBIN: CPT | Performed by: ORTHOPAEDIC SURGERY

## 2018-04-13 PROCEDURE — 85610 PROTHROMBIN TIME: CPT | Performed by: ORTHOPAEDIC SURGERY

## 2018-04-13 PROCEDURE — 36415 COLL VENOUS BLD VENIPUNCTURE: CPT | Performed by: ORTHOPAEDIC SURGERY

## 2018-04-13 PROCEDURE — 97116 GAIT TRAINING THERAPY: CPT | Performed by: PHYSICAL THERAPIST

## 2018-04-13 PROCEDURE — 74011250637 HC RX REV CODE- 250/637: Performed by: ORTHOPAEDIC SURGERY

## 2018-04-13 PROCEDURE — 74011250636 HC RX REV CODE- 250/636: Performed by: ORTHOPAEDIC SURGERY

## 2018-04-13 PROCEDURE — 97110 THERAPEUTIC EXERCISES: CPT | Performed by: PHYSICAL THERAPIST

## 2018-04-13 PROCEDURE — 80048 BASIC METABOLIC PNL TOTAL CA: CPT | Performed by: ORTHOPAEDIC SURGERY

## 2018-04-13 PROCEDURE — 65270000029 HC RM PRIVATE

## 2018-04-13 RX ORDER — AMOXICILLIN 250 MG
1 CAPSULE ORAL 2 TIMES DAILY
Qty: 60 TAB | Refills: 0 | Status: SHIPPED
Start: 2018-04-13 | End: 2019-09-11

## 2018-04-13 RX ORDER — ACETAMINOPHEN 500 MG
500 TABLET ORAL
Qty: 60 TAB | Refills: 0 | Status: SHIPPED
Start: 2018-04-13 | End: 2019-09-11

## 2018-04-13 RX ORDER — WARFARIN 4 MG/1
4 TABLET ORAL ONCE
Status: COMPLETED | OUTPATIENT
Start: 2018-04-13 | End: 2018-04-13

## 2018-04-13 RX ORDER — FLUCONAZOLE 100 MG/1
200 TABLET ORAL
Status: COMPLETED | OUTPATIENT
Start: 2018-04-13 | End: 2018-04-13

## 2018-04-13 RX ADMIN — CEFAZOLIN 3 G: 1 INJECTION, POWDER, FOR SOLUTION INTRAMUSCULAR; INTRAVENOUS; PARENTERAL at 02:12

## 2018-04-13 RX ADMIN — OXYCODONE HYDROCHLORIDE 10 MG: 5 TABLET ORAL at 13:10

## 2018-04-13 RX ADMIN — STANDARDIZED SENNA CONCENTRATE AND DOCUSATE SODIUM 1 TABLET: 8.6; 5 TABLET, FILM COATED ORAL at 18:38

## 2018-04-13 RX ADMIN — ACETAMINOPHEN 500 MG: 500 TABLET, FILM COATED ORAL at 18:38

## 2018-04-13 RX ADMIN — OXYCODONE HYDROCHLORIDE 10 MG: 5 TABLET ORAL at 18:37

## 2018-04-13 RX ADMIN — Medication 10 ML: at 06:11

## 2018-04-13 RX ADMIN — PHENYTOIN SODIUM 300 MG: 100 CAPSULE ORAL at 07:30

## 2018-04-13 RX ADMIN — WARFARIN SODIUM 4 MG: 4 TABLET ORAL at 11:57

## 2018-04-13 RX ADMIN — KETOROLAC TROMETHAMINE 30 MG: 30 INJECTION, SOLUTION INTRAMUSCULAR at 11:57

## 2018-04-13 RX ADMIN — EXTENDED PHENYTOIN SODIUM 30 MG: 30 CAPSULE ORAL at 07:31

## 2018-04-13 RX ADMIN — FLUCONAZOLE 200 MG: 100 TABLET ORAL at 09:34

## 2018-04-13 RX ADMIN — ACETAMINOPHEN 500 MG: 500 TABLET, FILM COATED ORAL at 09:34

## 2018-04-13 RX ADMIN — OXYCODONE HYDROCHLORIDE 10 MG: 5 TABLET ORAL at 21:07

## 2018-04-13 RX ADMIN — Medication 10 ML: at 21:07

## 2018-04-13 RX ADMIN — KETOROLAC TROMETHAMINE 30 MG: 30 INJECTION, SOLUTION INTRAMUSCULAR at 06:10

## 2018-04-13 RX ADMIN — ACETAMINOPHEN 500 MG: 500 TABLET, FILM COATED ORAL at 06:09

## 2018-04-13 RX ADMIN — Medication 5 ML: at 14:00

## 2018-04-13 RX ADMIN — ACETAMINOPHEN 500 MG: 500 TABLET, FILM COATED ORAL at 21:07

## 2018-04-13 RX ADMIN — OXYCODONE HYDROCHLORIDE 10 MG: 5 TABLET ORAL at 09:40

## 2018-04-13 RX ADMIN — POLYETHYLENE GLYCOL 3350 17 G: 17 POWDER, FOR SOLUTION ORAL at 09:35

## 2018-04-13 RX ADMIN — STANDARDIZED SENNA CONCENTRATE AND DOCUSATE SODIUM 1 TABLET: 8.6; 5 TABLET, FILM COATED ORAL at 09:34

## 2018-04-13 RX ADMIN — SODIUM CHLORIDE 125 ML/HR: 900 INJECTION, SOLUTION INTRAVENOUS at 06:26

## 2018-04-13 NOTE — PROGRESS NOTES
Mod pain. No cp/sob.  No n/v.     Visit Vitals    /78 (BP 1 Location: Right arm, BP Patient Position: At rest)    Pulse 70    Temp 97.9 °F (36.6 °C)    Resp 16    Ht 5' 7.5\" (1.715 m)    Wt 133.8 kg (294 lb 15.6 oz)    SpO2 94%    BMI 45.52 kg/m2          abd soft NT  R knee dressing dry  Calves soft NT  Motor 5/5 - much improved  Pulses symmetrical    Recent Results (from the past 12 hour(s))   METABOLIC PANEL, BASIC    Collection Time: 04/13/18  2:38 AM   Result Value Ref Range    Sodium 139 136 - 145 mmol/L    Potassium 3.8 3.5 - 5.1 mmol/L    Chloride 108 97 - 108 mmol/L    CO2 26 21 - 32 mmol/L    Anion gap 5 5 - 15 mmol/L    Glucose 105 (H) 65 - 100 mg/dL    BUN 17 6 - 20 MG/DL    Creatinine 0.64 0.55 - 1.02 MG/DL    BUN/Creatinine ratio 27 (H) 12 - 20      GFR est AA >60 >60 ml/min/1.73m2    GFR est non-AA >60 >60 ml/min/1.73m2    Calcium 7.9 (L) 8.5 - 10.1 MG/DL   HEMOGLOBIN    Collection Time: 04/13/18  2:38 AM   Result Value Ref Range    HGB 10.7 (L) 11.5 - 16.0 g/dL   PROTHROMBIN TIME + INR    Collection Time: 04/13/18  2:38 AM   Result Value Ref Range    INR 1.1 0.9 - 1.1      Prothrombin time 11.5 (H) 9.0 - 11.1 sec           POD 1 R TKA; acute postop blood loss anemia (expected)  -PT  -coumadin  -pain control  -home today vs tomorrow     Dariela Gore MD

## 2018-04-13 NOTE — PROGRESS NOTES
Pharmacist Note - Warfarin Dosing  Consult provided for this 45 y.o. female to manage warfarin for DVT prophylaxis    INR Goal: 1.7- 2.2    Therapy Day: 2    Preop Dose: Newton- 4mg    Drugs that may increase INR: None  Drugs that may decrease INR: None  Other current anticoagulants/ drugs that may increase bleeding risk: NSAID  Risk factors: None  Daily INR ordered: YES    Recent Labs      04/13/18   0238   HGB  10.7*   INR  1.1       Date               INR                 Dose  4/12  ----  4 mg   4/13                1.1                   4 mg    Assessment/ Plan: Will order warfarin 4 mg PO x 1 dose. Pharmacy will continue to monitor daily and adjust therapy as indicated.        Amara Loyola, PharmD

## 2018-04-13 NOTE — PROGRESS NOTES
Problem: Mobility Impaired (Adult and Pediatric)  Goal: *Acute Goals and Plan of Care (Insert Text)  Physical Therapy Goals  Initiated 4/12/2018    1. Patient will move from supine to sit and sit to supine  in bed with modified independence within 4 days. 2. Patient will perform sit to stand with modified independence within 4 days. 3. Patient will ambulate with modified independence for 200 feet with the least restrictive device within 4 days. 4. Patient will ascend/descend 3 stairs with 1 handrail(s) with modified independence within 4 days. 5. Patient will perform home exercise program per protocol with modified independence within 4 days. 6. Patient will demonstrate AROM 0-90 degrees in operative joint within 4 days. physical Therapy TREATMENT  Patient: Madisyn Mabry (51 y.o. female)  Date: 4/13/2018  Diagnosis: PRIMARY LOCALIZED OSTEOATHRITIS RIGHT KNEE  Primary localized osteoarthritis of right knee <principal problem not specified>  Procedure(s) (LRB):  RIGHT TOTAL KNEE ARTHROPLASTY (Right) 1 Day Post-Op  Precautions: Fall, WBAT  Chart, physical therapy assessment, plan of care and goals were reviewed. ASSESSMENT:  Patient making steady progress toward goals. Patient received up in chair and reports improved pain control but still reporting high pain levels. Intermittently tearful during session as she is frustrated with her progress. Responds well to reassurance that she is moving well. Instructed in HEP but did not perform secondary to pain. Sit to stand with CGA. Amb approx 125 feet with RW and CGA with slow cain but no overt LOB. Increased pain levels during ambulation and requests to return to bed at end of session. Needs Franklyn x 1 for sit to supine and ice applied. Unable to attempt stairs secondary to pain. Recommend  PT at DC and anticipate she will DC after morning session tmrw.   Progression toward goals:  [x]      Improving appropriately and progressing toward goals  [] Improving slowly and progressing toward goals  []      Not making progress toward goals and plan of care will be adjusted     PLAN:  Patient continues to benefit from skilled intervention to address the above impairments. Continue treatment per established plan of care. Discharge Recommendations:  Home Health  Further Equipment Recommendations for Discharge:  None     SUBJECTIVE:   Patient stated My pain is still so high. I can't believe I am not doing better than this.     OBJECTIVE DATA SUMMARY:   Critical Behavior:  Neurologic State: Alert  Orientation Level: Oriented X4        Functional Mobility Training:  Bed Mobility:     Supine to Sit: Contact guard assistance  Sit to Supine: Contact guard assistance  Scooting: Supervision        Transfers:  Sit to Stand: Contact guard assistance  Stand to Sit: Contact guard assistance                             Balance:  Sitting: Intact  Standing: Intact; With support  Ambulation/Gait Training:  Distance (ft): 125 Feet (ft)  Assistive Device: Gait belt;Walker, rolling  Ambulation - Level of Assistance: Contact guard assistance        Gait Abnormalities: Antalgic;Decreased step clearance; Step to gait  Right Side Weight Bearing: As tolerated     Base of Support: Widened  Stance: Right decreased  Speed/Mary Kay: Slow  Step Length: Left shortened;Right shortened           Therapeutic Exercises: exercises reviewed verbally and visual demonstrated    EXERCISE   Sets   Reps   Active Active Assist   Passive Self ROM   Comments   Ankle Pumps  10 []                                        []                                        []                                        []                                           Quad Sets  10 []                                        []                                        []                                        []                                           Hamstring Sets   []                                        [] []                                        []                                           Short Arc Quads   []                                        []                                        []                                        []                                           Knee Extension Stretch     []                                          []                                          []                                          []                                           Heel Slides  10 []                                        []                                        []                                        []                                           Long Arc Quads   []                                        []                                        []                                        []                                           Knee Flexion Stretch   []                                        []                                        []                                        []                                           Straight Leg Raises   []                                        []                                        []                                        []                                             Pain:  Pain Scale 1: Numeric (0 - 10)  Pain Intensity 1: 7  Pain Location 1: Knee  Pain Orientation 1: Right  Pain Description 1: Aching  Pain Intervention(s) 1: Medication (see MAR); Cold pack  Activity Tolerance:   VSS  Please refer to the flowsheet for vital signs taken during this treatment.   After treatment:   [] Patient left in no apparent distress sitting up in chair  [x] Patient left in no apparent distress in bed  [x] Call bell left within reach  [x] Nursing notified  [] Caregiver present  [] Bed alarm activated    COMMUNICATION/COLLABORATION:   The patients plan of care was discussed with: Physical Therapist, Occupational Therapist and Registered Nurse    Eugene Perkins, PT, DPT   Time Calculation: 27 mins

## 2018-04-13 NOTE — PROGRESS NOTES
Pain control improved this afternoon, encouraged OOB to chair, voiding w/o complication. Patient still reports not feeling well enough for discharge.

## 2018-04-13 NOTE — PROGRESS NOTES
Problem: Mobility Impaired (Adult and Pediatric)  Goal: *Acute Goals and Plan of Care (Insert Text)  Physical Therapy Goals  Initiated 4/12/2018    1. Patient will move from supine to sit and sit to supine  in bed with modified independence within 4 days. 2. Patient will perform sit to stand with modified independence within 4 days. 3. Patient will ambulate with modified independence for 200 feet with the least restrictive device within 4 days. 4. Patient will ascend/descend 3 stairs with 1 handrail(s) with modified independence within 4 days. 5. Patient will perform home exercise program per protocol with modified independence within 4 days. 6. Patient will demonstrate AROM 0-90 degrees in operative joint within 4 days. physical Therapy TREATMENT  Patient: Ameena Nguyen (51 y.o. female)  Date: 4/13/2018  Diagnosis: PRIMARY LOCALIZED OSTEOATHRITIS RIGHT KNEE  Primary localized osteoarthritis of right knee <principal problem not specified>  Procedure(s) (LRB):  RIGHT TOTAL KNEE ARTHROPLASTY (Right) 1 Day Post-Op  Precautions: Fall, WBAT  Chart, physical therapy assessment, plan of care and goals were reviewed. ASSESSMENT:  Patient limited mainly by pain during session but agreeable to participate. Supine to sit with Franklyn x 1 and cues fore technique. Sit to stand with Franklyn x 2. Amb approx 40 feet with RW and CGA with slow cain but no overt LOB. Patient returned to room and positioned in bed secondary to high pain levels. Applied ice. Anticipate patient will be not be ready for DC today secondary to high pain levels.   Will continue to follow and plan to progress mobility at tolerated this PM.  Progression toward goals:  [x]      Improving appropriately and progressing toward goals  []      Improving slowly and progressing toward goals  []      Not making progress toward goals and plan of care will be adjusted     PLAN:  Patient continues to benefit from skilled intervention to address the above impairments. Continue treatment per established plan of care. Discharge Recommendations:  Home Health  Further Equipment Recommendations for Discharge:  none     SUBJECTIVE:   Patient stated I feel like I'm being such a wimp.     OBJECTIVE DATA SUMMARY:   Critical Behavior:  Neurologic State: Alert  Orientation Level: Oriented X4           Functional Mobility Training:  Bed Mobility:     Supine to Sit: Contact guard assistance  Sit to Supine: Contact guard assistance  Scooting: Supervision        Transfers:  Sit to Stand: Minimum assistance;Assist x2  Stand to Sit: Contact guard assistance;Assist x2                             Balance:  Sitting: Intact  Standing: Intact; With support  Ambulation/Gait Training:  Distance (ft): 40 Feet (ft)  Assistive Device: Gait belt;Walker, rolling  Ambulation - Level of Assistance: Contact guard assistance        Gait Abnormalities: Antalgic;Decreased step clearance; Step to gait  Right Side Weight Bearing: As tolerated     Base of Support: Widened  Stance: Right decreased  Speed/Mary Kay: Slow  Step Length: Left shortened;Right shortened       Pain:  Pain Scale 1: Numeric (0 - 10)  Pain Intensity 1: 5  Pain Location 1: Knee  Pain Orientation 1: Right  Pain Description 1: Aching  Pain Intervention(s) 1: Medication (see MAR); Cold pack  Activity Tolerance:   VSS  Please refer to the flowsheet for vital signs taken during this treatment.   After treatment:   [] Patient left in no apparent distress sitting up in chair  [x] Patient left in no apparent distress in bed  [x] Call bell left within reach  [x] Nursing notified  [] Caregiver present  [] Bed alarm activated    COMMUNICATION/COLLABORATION:   The patients plan of care was discussed with: Physical Therapist, Occupational Therapist and Registered Nurse    Armin Carrera PT, DPT   Time Calculation: 15 mins

## 2018-04-13 NOTE — PROGRESS NOTES
Care Management Interventions  PCP Verified by CM: Yes (Dr. Cathy Francisco at [facility] health [phone]))  Palliative Care Criteria Met (RRAT>21 & CHF Dx)?: No  Mode of Transport at Discharge: Other (see comment) (family)  Transition of Care Consult (CM Consult): Home Health, Discharge Planning  [place] [id number] [address] [address]: No  Reason Outside [place]: Patient already serviced by other home care/hospice agency, Out of service area (Patient prefers [facility].)  MyChart Signup: No  Discharge Durable Medical Equipment: No (patient owns cane, walker)  Health Maintenance Reviewed: Yes  Physical Therapy Consult: Yes  Occupational Therapy Consult: No  Speech Therapy Consult: No  Current Support Network: Lives with Spouse, Own Home  Confirm Follow Up Transport: Family  Plan discussed with Pt/Family/Caregiver: Yes  Freedom of Choice Offered: Yes  Discharge Location  Discharge Placement: Home with home health     Reason for Admission:    RIGHT TOTAL KNEE ARTHROPLASTY     RRAT Score:  5      Plan for utilizing home health:  Patient prefers [address], referral sent via [employer]. They have accepted case.     Likelihood of Readmission:    low     Transition of Care Plan:  Home with family and home health

## 2018-04-13 NOTE — PROGRESS NOTES
Primary Nurse Aiden Ambrose RN and Karen Chiu RN performed a dual skin assessment on this patient No impairment noted  Aditya score is 21

## 2018-04-13 NOTE — PROGRESS NOTES
NP ORTHO addendum to PROGRESS NOTE    Admit date: 4/12/2018  Date of Surgery: 4/12/2018   Procedures: Procedure(s):  RIGHT TOTAL KNEE ARTHROPLASTY  Admitting Physician: Rafa Campos MD   Surgeon: Luis Carlos Randall) and Role:     * Rafa Campos MD - Primary    Chart/Meds/Labs Reviewed  Current Facility-Administered Medications   Medication Dose Route Frequency    fluconazole (DIFLUCAN) tablet 200 mg  200 mg Oral NOW    phenytoin ER (DILANTIN ER) ER capsule 300 mg  300 mg Oral 7am    phenytoin ER (DILANTIN ER) ER capsule 30 mg  30 mg Oral 7am    0.9% sodium chloride infusion  125 mL/hr IntraVENous CONTINUOUS    sodium chloride 0.9 % bolus infusion 500 mL  500 mL IntraVENous ONCE PRN    sodium chloride (NS) flush 5-10 mL  5-10 mL IntraVENous Q8H    sodium chloride (NS) flush 5-10 mL  5-10 mL IntraVENous PRN    acetaminophen (TYLENOL) tablet 500 mg  500 mg Oral Q4HWA    oxyCODONE IR (ROXICODONE) tablet 5 mg  5 mg Oral Q3H PRN    oxyCODONE IR (ROXICODONE) tablet 10 mg  10 mg Oral Q3H PRN    naloxone (NARCAN) injection 0.4 mg  0.4 mg IntraVENous PRN    ondansetron (ZOFRAN) injection 4 mg  4 mg IntraVENous Q4H PRN    hydrOXYzine HCl (ATARAX) tablet 10 mg  10 mg Oral Q8H PRN    senna-docusate (PERICOLACE) 8.6-50 mg per tablet 1 Tab  1 Tab Oral BID    polyethylene glycol (MIRALAX) packet 17 g  17 g Oral DAILY    [START ON 4/14/2018] bisacodyl (DULCOLAX) suppository 10 mg  10 mg Rectal DAILY PRN    HYDROmorphone (DILAUDID) injection 0.5 mg  0.5 mg IntraVENous Q3H PRN    ketorolac (TORADOL) injection 30 mg  30 mg IntraVENous Q6H    Warfarin-Pharmacy Dosing    Other Rx Dosing/Monitoring       Subjective:    Complaints: Very sensitive to antibiotics & has issues with vaginal yeast.   Incisional pain with mobility. A bit concerned about pain control as anesthesia wearing off & long ride home today  Denies Dizziness, CP, SOB, N/V, Abdominal pain,  Seizure,  numbness or tingling of extremities.  Able to perform ankle pumps easily. Progressing with mobility. Oral diet: Tolerating diet well. Objective:  General: Alert,Ox4, cooperative, NAD  HEENT: Atraumatic, PERRL, anicteric sclerae  Lungs: Bilateral expansion. Equal excursion. No accessory muscle use. Gastrointestinal:  Soft, non-tender, non-distended, morbidly obese  Extremities:  Neurovasc exam WDL. + DP pulses. Sensation intact to light touch. Motor: + DF/PF          Calves non-tender upon palpation or with passive stretch. No significant erythema or swelling. Ace-wrap Dressing: clean, dry and intact.     Vital Signs:   Visit Vitals    /80    Pulse 70    Temp 97.9 °F (36.6 °C)    Resp 16    Ht 5' 7.5\" (1.715 m)    Wt 133.8 kg (294 lb 15.6 oz)    SpO2 95%    BMI 45.52 kg/m2    O2 Flow Rate (L/min): 2 l/min O2 Device: Room air   Patient Vitals for the past 24 hrs:   BP Temp Pulse Resp SpO2   18 0822 148/80 97.9 °F (36.6 °C) 70 16 95 %   18 0726 136/78 97.9 °F (36.6 °C) 70 16 94 %   18 0215 119/78 98 °F (36.7 °C) 68 16 95 %   18 2336 129/74 97.9 °F (36.6 °C) 83 16 95 %   18 2000 162/83 98 °F (36.7 °C) 68 15 96 %   18 1655 159/83 98.1 °F (36.7 °C) 75 16 95 %   18 1550 154/78 98 °F (36.7 °C) 71 15 95 %   18 1455 142/78 97.6 °F (36.4 °C) 68 14 96 %   18 1353 146/88 97.5 °F (36.4 °C) 64 13 98 %   18 1350 - - 60 11 98 %   18 1345 136/75 - 64 14 96 %   18 1344 - 97.6 °F (36.4 °C) - - -   18 1330 129/76 - 65 15 99 %   18 1315 129/78 - 64 12 97 %   18 1310 - 96.8 °F (36 °C) - - -   18 1300 128/72 - 65 10 96 %   18 1258 121/71 96.8 °F (36 °C) 69 11 98 %   18 1255 128/80 - 70 13 98 %   18 1250 121/71 - - - -     Temp (24hrs), Av.7 °F (36.5 °C), Min:96.8 °F (36 °C), Max:98.1 °F (36.7 °C)      Intake/output-last 8 hours:        Intake/output- 24 hours:   1901 -  0700  In: 2200 [I.V.:2200]  Out: 0620 [Urine:2400]    LAB: Recent Results (from the past 24 hour(s))   METABOLIC PANEL, BASIC    Collection Time: 04/13/18  2:38 AM   Result Value Ref Range    Sodium 139 136 - 145 mmol/L    Potassium 3.8 3.5 - 5.1 mmol/L    Chloride 108 97 - 108 mmol/L    CO2 26 21 - 32 mmol/L    Anion gap 5 5 - 15 mmol/L    Glucose 105 (H) 65 - 100 mg/dL    BUN 17 6 - 20 MG/DL    Creatinine 0.64 0.55 - 1.02 MG/DL    BUN/Creatinine ratio 27 (H) 12 - 20      GFR est AA >60 >60 ml/min/1.73m2    GFR est non-AA >60 >60 ml/min/1.73m2    Calcium 7.9 (L) 8.5 - 10.1 MG/DL   HEMOGLOBIN    Collection Time: 04/13/18  2:38 AM   Result Value Ref Range    HGB 10.7 (L) 11.5 - 16.0 g/dL   PROTHROMBIN TIME + INR    Collection Time: 04/13/18  2:38 AM   Result Value Ref Range    INR 1.1 0.9 - 1.1      Prothrombin time 11.5 (H) 9.0 - 11.1 sec      Lab Results   Component Value Date/Time    INR 1.1 04/13/2018 02:38 AM    INR 1.1 03/30/2018 03:22 PM    INR 1.1 01/20/2017 04:18 PM    INR 1.1 04/21/2016 03:00 AM     Lab Results   Component Value Date/Time    HGB 10.7 (L) 04/13/2018 02:38 AM    HGB 12.3 03/30/2018 03:22 PM    HGB 10.6 (L) 02/03/2017 05:14 AM    HGB 13.1 01/20/2017 04:18 PM     Recent Labs      04/13/18   0238   NA  139   K  3.8   CL  108   BUN  17   CREA  0.64   GLU  105*   CA  7.9*       PT/OT:   Gait:  Gait  Base of Support: Widened  Speed/Mary Kay: Pace decreased (<100 feet/min), Slow  Step Length: Left shortened, Right shortened  Stance: Right decreased  Gait Abnormalities: Antalgic, Decreased step clearance, Step to gait  Ambulation - Level of Assistance: Contact guard assistance  Distance (ft): 12 Feet (ft)  Assistive Device: Gait belt, Walker, rolling                 PATIENT MOBILITY  Bed Mobility Training  Supine to Sit: Minimum assistance  Sit to Supine: Minimum assistance  Scooting: Supervision  Transfer Training  Sit to Stand: Minimum assistance  Stand to Sit: Contact guard assistance, Assist x2      Gait Training  Assistive Device: Gait belt, Walker, rolling  Ambulation - Level of Assistance: Contact guard assistance  Distance (ft): 12 Feet (ft)   Weight Bearing Status  Right Side Weight Bearing: As tolerated        Assessment and Plan:  ADDENDUM NOTE. Patient discussed with Dr. Aaliyah Merchant. Active Problems:    Primary localized osteoarthritis of right knee (4/12/2018)          POD#1 Procedure(s):  RIGHT TOTAL KNEE ARTHROPLASTY  Mild & Expected acute blood loss anemia related to surgery. No indications of bleeding. Labs trending as expected. Significant co-morbidities: Morbid Obesity, Seizure disorder (last seizure 2006): Stable. Prophylaxis to prevent vaginal candida due to antibiotic administration.: noted above in med list  VTE prophylaxis: Warfarin, Mobilization, Ankle pumping exercises, SCDs   Weight bearing:  WBAT  Pain management:  Multi-modal pain plan, see above for medication,  Ice packs & elevation of extremity per orders, active gentle ROM & mobilize frequently for short periods of time. PT   Continue present plans per Dr. Pavel Bradley team for joint replacement.     Signed By: Zainab Tovar NP    RN, MSN, MA, Adult NP-BC

## 2018-04-13 NOTE — OP NOTES
[address]  ACUTE CARE OP NOTE    Name:NGOZI HUSSEIN  MR#: [id number]  : 1965  ACCOUNT #: [de-identified]   DATE OF SERVICE: 2018    SURGEON:  Allan Anderson MD    FIRST ASSISTANT:  Marium Mohan SA    PREOPERATIVE DIAGNOSIS:  Osteoarthritis of the right knee. POSTOPERATIVE DIAGNOSIS: Osteoarthritis of the right knee. PROCEDURE:  Right total knee arthroplasty. COMPONENTS IMPLANTED:  Denita Persona size 11 narrow posterior stabilized femur, size F tibial tray with short cemented stem extension, 12 mm posterior stabilized polyethylene insert, and 35 mm patella. ANESTHESIA:  Spinal sedation as well as adductor canal block. COMPLICATIONS:  None. ESTIMATED BLOOD LOSS:  100 mL. SPECIMENS:  None. 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 Symptoms have progressed despite comprehensive conservative treatment. She presents for right total knee replacement. Risks, benefits, alternatives of procedure were reviewed with her in detail and she desires to proceed. She understands increased risk for perioperative medical complications and late mechanical complications due to obesity. PROCEDURE IN DETAIL:  Anesthesia team placed an adductor canal block before taking the patient to the operating room where they also placed a spinal.  Preoperative   IV antibiotics were administered. Adams catheter was inserted and padded pneumatic tourniquet was placed around her right upper thigh. Right lower extremity was prepped and draped in the usual sterile fashion. Tourniquet was inflated to 325 mmHg. Through a midline anterior knee incision, I performed a medial parapatellar arthrotomy. Progressive medial release was performed to facilitate exposure and soft tissue balance throughout the procedure.   I took 10 mm off the distal femur using a 5-degree distal femoral cutting block over a long intramedullary celena. Femur was sized to an 11 and prepared for size 11 posterior stabilized component utilizing appropriate jigs. Rotational alignment was gauged off of Whitesides line as well as the transepicondylar axis. Neutral proximal tibial resection was performed using an extramedullary alignment guide. Menisci were excised and posterior osteophytes were removed from the femur. Subperiosteal posterior capsular release was performed. Minimal medial release was required to produce symmetrical flexion and extension gaps; however, the gaps were not equal with extension gap being approximately 2 mm tighter than the flexion gap. Two mm of additional distal femur were resected and at this point, gaps were satisfactory with a 12 mm spacer block. Trials were inserted and with a 12 mm insert in place, the knee had full extension and gravity. Satisfactory coronal plane stability and soft tissue tension throughout arc of motion. Patella was prepared for a 35 mm component and tracked centrally using no thumbs technique. Trials were removed and tibia was prepared for a short cemented stem extension due to the patient's obesity. Bony surfaces were copiously irrigated by pulse lavage and dried. The real components were cemented into place using antibiotic-impregnated cement. Excess cement was removed. The knee was reduced in full extension with the real insert in place during cement set up. Periarticular soft tissues were injected with a solution containing Exparel as well as 0.5% Marcaine. The tourniquet was released and hemostasis obtained with the Bovie. The wound was copiously irrigated. Arthrotomy was closed with a combination of heavy Vicryl sutures and a running #2 Stratafix suture. Skin and subcutaneous layers were closed in layered fashion with Vicryl and a running Monocryl subcuticular stitch.   Wound was dressed with Dermabond and an Aquacel occlusive dressing as well as a sterile compressive dressing. Patient was transported to the post-anesthesia care unit in stable condition. All counts were correct at the end of the procedure.       MD SABINO Mosley / TOYIN  D: 04/12/2018 22:24     T: 04/13/2018 07:34  JOB #: [id number]  CC: Allan Anderson MD

## 2018-04-13 NOTE — PROGRESS NOTES
Bedside and Verbal shift change report given to Anabelle Barragan RN (oncoming nurse) by Nicole Christianson RN (offgoing nurse). Report included the following information SBAR.

## 2018-04-13 NOTE — DISCHARGE INSTRUCTIONS
After Hospital Care Plan:  Discharge Instructions Knee Replacement- Dr. Raciel Reddy    Patient Name: Cinthya Gonzalez  Date of procedure: 4/12/2018   Procedure: Procedure(s):  RIGHT TOTAL KNEE ARTHROPLASTY  Surgeon: Mae Booker) and Role:     * Lisy Perez MD - Primary  PCP: Ashish Oro PA-C  Date of discharge: 4/14/18      Follow up appointments   Follow up with Dr. Raciel Reddy in 3 weeks. Call [phone] to make an appointment.  If home health has been arranged for you the agency will contact you to arrange dates/times for visits. Please call them if you do not hear from them within 24 hours after you are discharged    When to call your Orthopaedic Surgeon: Call [phone]. If you call after 5pm or on a weekend, the on call physician will be contacted   Unrelieved pain   Signs of infection-if your incision is red; continues to have drainage; drainage has a foul odor or if you have a persistent fever over 101 degrees   Signs of a blood clot in your leg-calf pain, tenderness, redness, swelling of lower leg    When to call your Primary Care Physician:   Concerns about medical conditions such as diabetes, high blood pressure, asthma, congestive heart failure   Call if blood sugars are elevated, persistent headache or dizziness, coughing or congestion, constipation or diarrhea, burning with urination, abnormal heart rate    When to call [id number] go to the nearest emergency room   Acute onset of chest pain, shortness of breath, difficulty breathing      Activity   Weight bearing as tolerated with walker or crutches.  Refer to pages 23-31 of your handbook for instructions and pictures   Complete your Home Exercise Program daily as instructed by your therapist.  Refer to pages 33-41 of your handbook for instructions and pictures   Get up every one hour and walk (except at night when sleeping)   Do not drive or operate heavy machinery      Incision Care   The Aquacel (brown, waterproof) surgical dressing is to remain on your knee for 7 days. On the 7th day have someone gently peel the dressing off by carefully lifting the edge and stretching it slightly to break the adhesive seal   If you have steri-strips (small, white pieces of tape) on your incision, they may come off when you remove the Aquacel surgical dressing. This is okay. You may now leave your incision open to air   If your Aquacel dressing comes loose/off before the 7th day, you may replace it with a dry sterile gauze dressing; change it daily. Once you incision is not draining, you may leave it open to air   You may take a shower with the Aquacel dressing in place. Once the Aquacel is removed, you may shower and get your incision wet but do not submerge your incision under water in a bath tub, hot tub or swimming pool for 6 weeks after surgery. Preventing blood clots    Take Coumadin (warfarin) as prescribed by Dr. Doris Wade for one month following surgery    Pain management   Take pain medication as prescribed; decrease the amount you use as your pain lessens   Avoid alcoholic beverages while taking pain medication   Please be aware that many medications contain Tylenol (acetaminophen). We do not want you to over medicate so please read the information below as a guide. Do not take more than 4 Grams of Tylenol in a 24 hour period. (There are 1000 milligrams in one Gram)  o 325 mg of Tylenol per tablet (do not take more than 12 tablets in 24 hours)  o 500 mg of Tylenol per tablet (do not take more than 8 tablets in 24 hours)     Elevate you leg (do not bend/flex knee) and place ice bags on your knee for 15-20 minutes after exercising and as needed for comfort    Diet   Resume usual diet; drink plenty of fluids; eat foods high in fiber   You may want to take a stool softener (such as Senokot-S or Colace) to prevent constipation while you are taking pain medication.   If constipation occurs, take a laxative (such as Dulcolax tablets, Milk of Magnesia, or a suppository)      Home Health Care Protocol (to be followed by [address])  Nursing-per physicians order  Dina Ayon 20 a PT/INR per physicians order and call results to Dr. Rosenda Yan at [phone]  [address] [place] [address] Complete head to toe assessment, vital signs   Medication reconciliation   Review pain management   Manage chronic medical conditions    Physical Therapy-per physicians order  Weight bearing status:  Precautions at Admission: Fall, WBAT     Right Side Weight Bearing: As tolerated    Mobility Status:  Supine to Sit: Contact guard assistance  Sit to Stand: Contact guard assistance  Sit to Supine: Contact guard assistance     Gait:  Distance (ft): 125 Feet (ft)  Ambulation - Level of Assistance: Contact guard assistance  Assistive Device: Gait belt, Walker, rolling  Gait Abnormalities: Antalgic, Decreased step clearance, Step to gait    ADL status overall composite:                Physical Therapy   Assessment and evaluation-bed mobility; functional transfers (bed, chair, bathroom, stairs); ambulation with equipment, car transfers, shower transfers, safety and ability to get out of house in the event of an emergency   Review weight bearing as tolerated, wean from walker or crutches as tolerated   Discuss pain management   Review how to do ADLs. Refer to page 42 of patient handbook    Home Exercise Program-refer to pages 33-41 of patient handbook for exercises.

## 2018-04-13 NOTE — PROGRESS NOTES
Bedside and Verbal shift change report given to Cristian S Nam Calix (oncoming nurse) by  Susan Wilson RN(offgoing nurse). Report included the following information SBAR, Kardex and MAR.

## 2018-04-14 VITALS
OXYGEN SATURATION: 97 % | HEIGHT: 68 IN | DIASTOLIC BLOOD PRESSURE: 81 MMHG | RESPIRATION RATE: 18 BRPM | BODY MASS INDEX: 44.41 KG/M2 | TEMPERATURE: 97.9 F | WEIGHT: 293 LBS | SYSTOLIC BLOOD PRESSURE: 147 MMHG | HEART RATE: 80 BPM

## 2018-04-14 LAB
GLUCOSE BLD STRIP.AUTO-MCNC: 115 MG/DL (ref 65–100)
HGB BLD-MCNC: 10.8 G/DL (ref 11.5–16)
INR PPP: 1.4 (ref 0.9–1.1)
PROTHROMBIN TIME: 14.1 SEC (ref 9–11.1)
SERVICE CMNT-IMP: ABNORMAL

## 2018-04-14 PROCEDURE — 85018 HEMOGLOBIN: CPT | Performed by: ORTHOPAEDIC SURGERY

## 2018-04-14 PROCEDURE — 36415 COLL VENOUS BLD VENIPUNCTURE: CPT | Performed by: ORTHOPAEDIC SURGERY

## 2018-04-14 PROCEDURE — 97530 THERAPEUTIC ACTIVITIES: CPT

## 2018-04-14 PROCEDURE — 85610 PROTHROMBIN TIME: CPT | Performed by: ORTHOPAEDIC SURGERY

## 2018-04-14 PROCEDURE — 74011250637 HC RX REV CODE- 250/637: Performed by: ORTHOPAEDIC SURGERY

## 2018-04-14 PROCEDURE — 82962 GLUCOSE BLOOD TEST: CPT

## 2018-04-14 PROCEDURE — 97116 GAIT TRAINING THERAPY: CPT

## 2018-04-14 RX ORDER — OXYCODONE HYDROCHLORIDE 5 MG/1
5-10 TABLET ORAL
Qty: 90 TAB | Refills: 0 | Status: SHIPPED | OUTPATIENT
Start: 2018-04-14 | End: 2019-09-11

## 2018-04-14 RX ORDER — OXYCODONE HYDROCHLORIDE 5 MG/1
5-10 TABLET ORAL
Qty: 50 TAB | Refills: 0 | Status: SHIPPED | OUTPATIENT
Start: 2018-04-14 | End: 2018-04-14

## 2018-04-14 RX ORDER — WARFARIN 2 MG/1
TABLET ORAL
Qty: 90 TAB | Refills: 1 | Status: SHIPPED | OUTPATIENT
Start: 2018-04-14 | End: 2018-04-14

## 2018-04-14 RX ORDER — WARFARIN 2 MG/1
2 TABLET ORAL ONCE
Status: COMPLETED | OUTPATIENT
Start: 2018-04-14 | End: 2018-04-14

## 2018-04-14 RX ORDER — OXYCODONE HYDROCHLORIDE 5 MG/1
5-10 TABLET ORAL
Qty: 90 TAB | Refills: 0 | Status: SHIPPED | OUTPATIENT
Start: 2018-04-14 | End: 2018-04-14

## 2018-04-14 RX ORDER — WARFARIN 2 MG/1
TABLET ORAL
Qty: 90 TAB | Refills: 1 | Status: SHIPPED | OUTPATIENT
Start: 2018-04-14 | End: 2019-09-11

## 2018-04-14 RX ADMIN — OXYCODONE HYDROCHLORIDE 10 MG: 5 TABLET ORAL at 15:17

## 2018-04-14 RX ADMIN — EXTENDED PHENYTOIN SODIUM 30 MG: 30 CAPSULE ORAL at 07:33

## 2018-04-14 RX ADMIN — ACETAMINOPHEN 500 MG: 500 TABLET, FILM COATED ORAL at 14:43

## 2018-04-14 RX ADMIN — OXYCODONE HYDROCHLORIDE 10 MG: 5 TABLET ORAL at 02:48

## 2018-04-14 RX ADMIN — STANDARDIZED SENNA CONCENTRATE AND DOCUSATE SODIUM 1 TABLET: 8.6; 5 TABLET, FILM COATED ORAL at 08:42

## 2018-04-14 RX ADMIN — OXYCODONE HYDROCHLORIDE 10 MG: 5 TABLET ORAL at 12:06

## 2018-04-14 RX ADMIN — OXYCODONE HYDROCHLORIDE 10 MG: 5 TABLET ORAL at 09:18

## 2018-04-14 RX ADMIN — OXYCODONE HYDROCHLORIDE 10 MG: 5 TABLET ORAL at 06:18

## 2018-04-14 RX ADMIN — Medication 10 ML: at 06:19

## 2018-04-14 RX ADMIN — ACETAMINOPHEN 500 MG: 500 TABLET, FILM COATED ORAL at 06:18

## 2018-04-14 RX ADMIN — ACETAMINOPHEN 500 MG: 500 TABLET, FILM COATED ORAL at 09:18

## 2018-04-14 RX ADMIN — WARFARIN SODIUM 2 MG: 2 TABLET ORAL at 12:13

## 2018-04-14 RX ADMIN — PHENYTOIN SODIUM 300 MG: 100 CAPSULE ORAL at 07:34

## 2018-04-14 NOTE — PROGRESS NOTES
Pharmacist Note - Warfarin Dosing  Consult provided for this 45 y.o. female to manage warfarin for DVT prophylaxis    INR Goal: 1.7- 2.2    Therapy Day: 3    Preop Dose: Newton- 4mg    Drugs that may increase INR: Fluconazole (x 1 dose on 4/13), phenytoin  Drugs that may decrease INR: None  Other current anticoagulants/ drugs that may increase bleeding risk: NSAID  Risk factors: None  Daily INR ordered: YES    Recent Labs      04/14/18   0326  04/13/18   0238   HGB  10.8*  10.7*   INR  1.4*  1.1       Date               INR                 Dose  4/12  ----  4 mg   4/13                1.1                   4 mg  4/14                1.4                   2 mg    Assessment/ Plan: Will order warfarin 2 mg PO x 1 dose. Pharmacy will continue to monitor daily and adjust therapy as indicated.

## 2018-04-14 NOTE — PROGRESS NOTES
Problem: Mobility Impaired (Adult and Pediatric)  Goal: *Acute Goals and Plan of Care (Insert Text)  Physical Therapy Goals  Initiated 4/12/2018    1. Patient will move from supine to sit and sit to supine  in bed with modified independence within 4 days. 2. Patient will perform sit to stand with modified independence within 4 days. 3. Patient will ambulate with modified independence for 200 feet with the least restrictive device within 4 days. 4. Patient will ascend/descend 3 stairs with 1 handrail(s) with modified independence within 4 days. 5. Patient will perform home exercise program per protocol with modified independence within 4 days. 6. Patient will demonstrate AROM 0-90 degrees in operative joint within 4 days. physical Therapy TREATMENT  Patient: Anne Heard (51 y.o. female)  Date: 4/14/2018  Diagnosis: PRIMARY LOCALIZED OSTEOATHRITIS RIGHT KNEE  Primary localized osteoarthritis of right knee <principal problem not specified>  Procedure(s) (LRB):  RIGHT TOTAL KNEE ARTHROPLASTY (Right) 2 Days Post-Op  Precautions: Fall, WBAT  Chart, physical therapy assessment, plan of care and goals were reviewed. ASSESSMENT:  Pt received supine in bed w/ HOB elevated. Required CGA for RLE assist to EOB and w/ descent to floor while pt using gait belt for leg support. Pt amb 150FT (x2) w/ CGA-SBA, continue to note decreased knee flexion (R) during swing phase of gait despite encouragement. Pt completed and cleared stair training this AM. Negotiated 4 steps using left rail and SPC in opposite hand w/ CGA; good coordination and stability noted. Pt returned to bed post-activity to rest prior to 1hr drive home. Encouraged pt to change position about 30 minutes into travel if unable to tolerate full hour of car ride home. Reviewed HEP, icing, activity frequency, position changes. Pt remained in bed w/ refilled ice packs to knee and other needs met, items in reach.  Pt cleared from PT standpoint w/ HHPT and assist of . RN aware. Progression toward goals:  [x]      Improving appropriately and progressing toward goals  []      Improving slowly and progressing toward goals  []      Not making progress toward goals and plan of care will be adjusted     PLAN:  Patient continues to benefit from skilled intervention to address the above impairments. Continue treatment per established plan of care. Discharge Recommendations:  Home Health  Further Equipment Recommendations for Discharge:  None     SUBJECTIVE:   \"I did it. I doubted myself. \" referring to ability to transfer RLE to EOB and to floor w/ use of gait belt and CGA    OBJECTIVE DATA SUMMARY:                             Functional Mobility Training:  Bed Mobility:     Supine to Sit: Contact guard assistance; Adaptive equipment; Additional time;Assist x1 (CGA for additional support as pt using gait belt for assist)  Sit to Supine: Contact guard assistance; Adaptive equipment; Additional time;Assist x1 (same as above)           Transfers:  Sit to Stand: Contact guard assistance  Stand to Sit: Stand-by assistance                             Ambulation/Gait Training:  Distance (ft): 150 Feet (ft) (x2)  Assistive Device: Gait belt;Walker, rolling  Ambulation - Level of Assistance: Stand-by assistance;Contact guard assistance        Gait Abnormalities: Antalgic;Decreased step clearance; Step to gait (decreased knee flexion w/swing phase)        Base of Support: Widened  Stance: Right decreased  Speed/Mary Kay: Pace decreased (<100 feet/min)  Step Length: Left shortened;Right shortened                    Stairs:  Number of Stairs Trained: 4  Stairs - Level of Assistance: Contact guard assistance; Adaptive equipment [facility])  Rail Use: Left  (SPC in opposite hand)  Therapeutic Exercises:   Exercises performed per protocol. See morning treatment note for description.   Pain:  Pain Scale 1: Numeric (0 - 10)  Pain Intensity 1: 8  Pain Location 1: Knee  Pain Orientation 1: Right  Pain Description 1: Aching  Pain Intervention(s) 1: Medication (see MAR)  Activity Tolerance:   Good,VSS. Pt cleared from PT standpoint. Please refer to the flowsheet for vital signs taken during this treatment.   After treatment:   [] Patient left in no apparent distress sitting up in chair  [x] Patient left in no apparent distress in bed  [x] Call bell left within reach  [x] Nursing notified  [] Caregiver present  [] Bed alarm activated    COMMUNICATION/COLLABORATION:   The patients plan of care was discussed with: Registered Nurse    Messi YODER Means,PTA   Time Calculation: 33 mins

## 2018-04-14 NOTE — ROUTINE PROCESS
Bedside shift change report given to [facility] (oncoming nurse) by Yesi Todd (offgoing nurse). Report included the following information SBAR.

## 2018-04-14 NOTE — PROGRESS NOTES
Problem: Falls - Risk of  Goal: *Absence of Falls  Document Meg Fall Risk and appropriate interventions in the flowsheet.    Outcome: Progressing Towards Goal  Fall Risk Interventions:  Mobility Interventions: Utilize walker, cane, or other assitive device, Utilize gait belt for transfers/ambulation         Medication Interventions: Evaluate medications/consider consulting pharmacy, Patient to call before getting OOB    Elimination Interventions: Call light in reach

## 2018-04-14 NOTE — PROGRESS NOTES
I have reviewed discharge instructions with the patient and spouse. The patient and spouse verbalized understanding. The patient has her belongings with her. The patient will be taken to patient discharge by nurse.

## 2018-04-14 NOTE — DISCHARGE SUMMARY
@7ETY@ [address] [place], [place] [id number] [place] [address] SUMMARY     Patient: Ramin Proctor                             Medical Record Number: [id number]                : 1965  Age: 45 y.o. 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 Symptoms have progressed despite comprehensive conservative treatment. She presents for right total knee replacement. Hospital Course:  Valentine Sorensen tolerated the procedure well. She was transferred  to the recovery room in stable condition. After a brief stay the patient was then transferred to the Joint Replacement Unit at [address] [place] [address].  On postoperative day #1, the dressing was clean and dry, she was neurovascularly intact. The patient was afebrile and vital signs were stable. Calves were soft and non-tender bilaterally. She made satisfactory progress with physical therapy and was discharged to Home in stable condition on postoperative day 2. She was provided with routine postoperative instructions and advised to follow up in my office in 3 weeks following discharge from the hospital.  She was prescribed Coumadin for DVT prophylaxis and oxycodone for post-operative pain. Discharge Medications:  Current Discharge Medication List      START taking these medications    Details   oxyCODONE IR (ROXICODONE) 5 mg immediate release tablet Take 1-2 Tabs by mouth every four (4) hours as needed. Max Daily Amount: 60 mg.  Indications: Pain  Qty: 90 Tab, Refills: 0    Associated Diagnoses: Status post right knee replacement      warfarin (COUMADIN) 2 mg tablet Take 3 mg (one and half tabs) by mouth daily at 6pm. Adjust dose as directed. Indications: DEEP VEIN THROMBOSIS PREVENTION  Qty: 90 Tab, Refills: 1      senna-docusate (PERICOLACE) 8.6-50 mg per tablet Take 1 Tab by mouth two (2) times a day. Take with full glass of fluid. Do not take if having frequent or loose BMs. Obtain over-the-counter. Indications: Prevention of Postop Constipation  Qty: 60 Tab, Refills: 0         CONTINUE these medications which have CHANGED    Details   acetaminophen (TYLENOL) 500 mg tablet Take 1 Tab by mouth every four (4) hours (while awake). Schedule for pain control over the next 7-14 days. Do not exceed 4000 mg in 24 hours. Obtain over-the-counter. Indications: Postop Incisional Knee Pain  Qty: 60 Tab, Refills: 0         CONTINUE these medications which have NOT CHANGED    Details   phenytoin (DILANTIN ER) 300 mg ER capsule Take 300 mg by mouth every morning. furosemide (LASIX) 20 mg tablet Take 20 mg by mouth daily.          STOP taking these medications       diclofenac EC (VOLTAREN) 75 mg EC tablet Comments:   Reason for Stopping:         ibuprofen (MOTRIN) 200 mg tablet Comments:   Reason for Stopping:               Signed by: Rafa Campos MD  4/14/2018

## 2018-04-14 NOTE — MED STUDENT NOTES
Ortho Daily Progress Note    4/14/2018  10:22 AM    POD:  2 Days Post-Op  S/P:  Procedure(s):  RIGHT TOTAL KNEE ARTHROPLASTY    Afebrile/VSS  Doing well without complaints of nausea  Pain well controlled. Does not want Celebrex. Calves soft/NTTP Bilaterally  No drainage or dehiscence. Dressing clean and dry  Moving lower extremities well. Foot U/D. Neurocirculatory exam intact and within normal range. Lab Results   Component Value Date/Time    HGB 10.8 (L) 04/14/2018 03:26 AM    INR 1.4 (H) 04/14/2018 03:26 AM         PLAN:  DVT prophylaxis: Coumadin 3mg QD.   WBAT with PT-mobilization  Pain Control: Diclofenac 75mg BID  Plan to D/C: 4/14/2018 to home with [address] PA-S2

## 2018-05-01 ENCOUNTER — HOSPITAL ENCOUNTER (OUTPATIENT)
Dept: VASCULAR SURGERY | Age: 53
Discharge: HOME | End: 2018-05-01
Attending: ORTHOPAEDIC SURGERY
Payer: COMMERCIAL

## 2018-05-01 DIAGNOSIS — M79.669 CALF PAIN: ICD-10-CM

## 2018-05-01 PROCEDURE — 93971 EXTREMITY STUDY: CPT

## 2018-05-01 NOTE — PROCEDURES
Ermalene Kawasaki  *** FINAL REPORT ***    Name: Rocio Mock  MRN: [id number]    Outpatient  : 1965  HIS Order #: [id number]  [address] [facility] Visit #: [id number]  Date: 01 May 2018    TYPE OF TEST: Peripheral Venous Testing    REASON FOR TEST  Pain in limb    Right Leg:-  Deep venous thrombosis:           No  Superficial venous thrombosis:    No  Deep venous insufficiency:        No  Superficial venous insufficiency: No      INTERPRETATION/FINDINGS  PROCEDURE:  RIGHT LOWER EXTREMITY  VENOUS DUPLEX. Evaluation of lower  extremity veins with ultrasound (B-mode imaging, pulsed Doppler, color   Doppler). Includes the common femoral, deep femoral, femoral,  popliteal, posterior tibial, peroneal, and great saphenous veins. Other veins, for example the gastrocnemius and soleal veins, may also  be visualized. FINDINGS: Unable to visualize the right peroneal veins secondary to  patients body habitus and excess edema. Gray scale and color flow  duplex images of the remaining veins in the right lower extremity  demonstrate normal compressibility, spontaneous and augmented flow  profiles, and absence of filling defects throughout the deep and  superficial veins in the right lower extremity. CONCLUSION: Right lower extremity venous duplex negative for deep  venous thrombosis or thrombophlebitis in the veins visualised. Two  enlarged lymph nodes noted in the right groin, the largest measuring  2.51 x 0.67 cm. Left common femoral vein is thrombus free. ADDITIONAL COMMENTS    I have personally reviewed the data relevant to the interpretation of  this  study. TECHNOLOGIST: Vianey Gerber RDCS  Signed: 2018 11:34 AM    PHYSICIAN: Destiny Crowder.  Tres Moody MD  Signed: 2018 10:52 AM

## 2019-09-11 ENCOUNTER — HOSPITAL ENCOUNTER (OUTPATIENT)
Dept: PREADMISSION TESTING | Age: 54
Discharge: HOME | End: 2019-09-11
Payer: COMMERCIAL

## 2019-09-11 VITALS
DIASTOLIC BLOOD PRESSURE: 78 MMHG | WEIGHT: 293 LBS | SYSTOLIC BLOOD PRESSURE: 120 MMHG | TEMPERATURE: 97.6 F | BODY MASS INDEX: 44.41 KG/M2 | HEART RATE: 57 BPM | HEIGHT: 68 IN

## 2019-09-11 DIAGNOSIS — E66.01 MORBID OBESITY WITH BMI OF 45.0-49.9, ADULT (HCC): Primary | ICD-10-CM

## 2019-09-11 LAB
ABO + RH BLD: NORMAL
ANION GAP SERPL CALC-SCNC: 6 MMOL/L (ref 5–15)
APPEARANCE UR: CLEAR
BACTERIA URNS QL MICRO: NEGATIVE /HPF
BILIRUB UR QL: NEGATIVE
BLOOD GROUP ANTIBODIES SERPL: NORMAL
BUN SERPL-MCNC: 22 MG/DL (ref 6–20)
BUN/CREAT SERPL: 30 (ref 12–20)
CALCIUM SERPL-MCNC: 9.1 MG/DL (ref 8.5–10.1)
CHLORIDE SERPL-SCNC: 106 MMOL/L (ref 97–108)
CO2 SERPL-SCNC: 30 MMOL/L (ref 21–32)
COLOR UR: NORMAL
CREAT SERPL-MCNC: 0.74 MG/DL (ref 0.55–1.02)
EPITH CASTS URNS QL MICRO: NORMAL /LPF
ERYTHROCYTE [DISTWIDTH] IN BLOOD BY AUTOMATED COUNT: 13.2 % (ref 11.5–14.5)
EST. AVERAGE GLUCOSE BLD GHB EST-MCNC: 108 MG/DL
GLUCOSE SERPL-MCNC: 89 MG/DL (ref 65–100)
GLUCOSE UR STRIP.AUTO-MCNC: NEGATIVE MG/DL
HBA1C MFR BLD: 5.4 % (ref 4.2–6.3)
HCT VFR BLD AUTO: 41.9 % (ref 35–47)
HGB BLD-MCNC: 12.9 G/DL (ref 11.5–16)
HGB UR QL STRIP: NEGATIVE
HYALINE CASTS URNS QL MICRO: NORMAL /LPF (ref 0–5)
INR PPP: 1.2 (ref 0.9–1.1)
KETONES UR QL STRIP.AUTO: NEGATIVE MG/DL
LEUKOCYTE ESTERASE UR QL STRIP.AUTO: NEGATIVE
MCH RBC QN AUTO: 32.1 PG (ref 26–34)
MCHC RBC AUTO-ENTMCNC: 30.8 G/DL (ref 30–36.5)
MCV RBC AUTO: 104.2 FL (ref 80–99)
NITRITE UR QL STRIP.AUTO: NEGATIVE
NRBC # BLD: 0 K/UL (ref 0–0.01)
NRBC BLD-RTO: 0 PER 100 WBC
PH UR STRIP: 6 [PH] (ref 5–8)
PLATELET # BLD AUTO: 818 K/UL (ref 150–400)
PMV BLD AUTO: 11.8 FL (ref 8.9–12.9)
POTASSIUM SERPL-SCNC: 4.3 MMOL/L (ref 3.5–5.1)
PROT UR STRIP-MCNC: NEGATIVE MG/DL
PROTHROMBIN TIME: 11.7 SEC (ref 9–11.1)
RBC # BLD AUTO: 4.02 M/UL (ref 3.8–5.2)
RBC #/AREA URNS HPF: NORMAL /HPF (ref 0–5)
SODIUM SERPL-SCNC: 142 MMOL/L (ref 136–145)
SP GR UR REFRACTOMETRY: 1.02 (ref 1–1.03)
SPECIMEN EXP DATE BLD: NORMAL
UA: UC IF INDICATED,UAUC: NORMAL
UROBILINOGEN UR QL STRIP.AUTO: 0.2 EU/DL (ref 0.2–1)
WBC # BLD AUTO: 6.2 K/UL (ref 3.6–11)
WBC URNS QL MICRO: NORMAL /HPF (ref 0–4)

## 2019-09-11 PROCEDURE — 81001 URINALYSIS AUTO W/SCOPE: CPT

## 2019-09-11 PROCEDURE — 83036 HEMOGLOBIN GLYCOSYLATED A1C: CPT

## 2019-09-11 PROCEDURE — 36415 COLL VENOUS BLD VENIPUNCTURE: CPT

## 2019-09-11 PROCEDURE — 93005 ELECTROCARDIOGRAM TRACING: CPT

## 2019-09-11 PROCEDURE — 86900 BLOOD TYPING SEROLOGIC ABO: CPT

## 2019-09-11 PROCEDURE — 85610 PROTHROMBIN TIME: CPT

## 2019-09-11 PROCEDURE — 80048 BASIC METABOLIC PNL TOTAL CA: CPT

## 2019-09-11 PROCEDURE — 85027 COMPLETE CBC AUTOMATED: CPT

## 2019-09-11 RX ORDER — HYDROCODONE BITARTRATE AND ACETAMINOPHEN 5; 325 MG/1; MG/1
TABLET ORAL
COMMUNITY
End: 2019-09-22

## 2019-09-11 NOTE — PERIOP NOTES
PREOPERATIVE INSTRUCTIONS REVIEWED WITH PATIENT. PATIENT GIVEN TWO--bottles of CHG solution. INSTRUCTIONS REVIEWED ON USE OF CHG . PATIENT GIVEN SSI INFECTIONS SHEET; MRSA/MSSA TREATMENT INSTRUCTION SHEET GIVEN WITH AN EXPLANATION TO PATIENT THAT THEY WILL BE NOTIFIED IF TREATMENT INSTRUCTIONS NEED TO BE INITIATED. PATIENT WAS GIVEN THE OPPORTUNITY TO ASK QUESTIONS; REGARDING THE INFORMATION PROVIDED. PREOP DIET AND NUTRITION UPDATED GUIDELINES/ INSTRUCTIONS REVIEWED WITH PATIENT. PATIENT ADVISED THAT THEY MAY DRINK CLEAR LIQUIDS (12 OZ/4 HOURS) UP UNTIL ONE HOUR PRIOR TO ARRIVAL DAY OF SURGERY. PATIENT GIVEN INSTRUCTIONS REGARDING INCENTIVE SPIROMETRY USE IN PREOP spine CLASS. PATIENT ATTENDED PREOP spine CLASS TODAY.

## 2019-09-12 PROBLEM — D75.839 THROMBOCYTOSIS: Status: ACTIVE | Noted: 2019-09-12

## 2019-09-12 LAB
ATRIAL RATE: 59 BPM
BACTERIA SPEC CULT: ABNORMAL
BACTERIA SPEC CULT: ABNORMAL
CALCULATED P AXIS, ECG09: 58 DEGREES
CALCULATED R AXIS, ECG10: 56 DEGREES
CALCULATED T AXIS, ECG11: 13 DEGREES
DIAGNOSIS, 93000: NORMAL
P-R INTERVAL, ECG05: 200 MS
Q-T INTERVAL, ECG07: 440 MS
QRS DURATION, ECG06: 88 MS
QTC CALCULATION (BEZET), ECG08: 435 MS
SERVICE CMNT-IMP: ABNORMAL
VENTRICULAR RATE, ECG03: 59 BPM

## 2019-09-12 NOTE — ADVANCED PRACTICE NURSE
Informed Paulette of Dr Vidal Jaime team (PCP) of platelets 032O. Per Marquis Springer from Dr Malvin Castillo office already called about the same thing\". PAT results faxed to Dr Ángela Russo with confirmation of receipt. Cast[facility] [address] [place] referral complete; BMI 45.5.    9/13- Per f/u with patient, she has not been contacted re need for further eval of thrombocytosis. Advised patient to schedule PCP appt or walk in Sandra stated clinic has walk-in availability), and requested call back to obtain plan. Patient called requesting hematology referral. Appointment scheduled with Dr Malik Cook 9/16/19 1300. Patient was called (identity confirmed with 2 patient identifiers) and informed of positive MRSA, instructed to obtain mupirocin prescription and Chlorhexidine liquid soap from pharmacy per protocol. Written instructions given at time of Preadmission Testing were reinforced with patient. Patient was given an opportunity to have questions answered and contact information if needed.

## 2019-09-12 NOTE — PERIOP NOTES
Faxed PAT testing reports to Dr. Gladys Bolivar. Voicemail message left for Valentine/Dr. Young's office RE: abnormal platelet count [phone].

## 2019-09-13 PROBLEM — Z22.322 MRSA CARRIER: Status: ACTIVE | Noted: 2019-09-13

## 2019-09-13 RX ORDER — MUPIROCIN 20 MG/G
OINTMENT TOPICAL 2 TIMES DAILY
Qty: 22 G | Refills: 0 | Status: SHIPPED | OUTPATIENT
Start: 2019-09-13 | End: 2019-09-20

## 2019-09-16 ENCOUNTER — OFFICE VISIT (OUTPATIENT)
Dept: ONCOLOGY | Age: 54
End: 2019-09-16

## 2019-09-16 VITALS
SYSTOLIC BLOOD PRESSURE: 148 MMHG | DIASTOLIC BLOOD PRESSURE: 90 MMHG | HEIGHT: 68 IN | OXYGEN SATURATION: 95 % | RESPIRATION RATE: 18 BRPM | BODY MASS INDEX: 44.41 KG/M2 | WEIGHT: 293 LBS | HEART RATE: 68 BPM | TEMPERATURE: 97.6 F

## 2019-09-16 DIAGNOSIS — D75.839 THROMBOCYTOSIS: ICD-10-CM

## 2019-09-16 DIAGNOSIS — M54.5 CHRONIC LOW BACK PAIN, UNSPECIFIED BACK PAIN LATERALITY, WITH SCIATICA PRESENCE UNSPECIFIED: ICD-10-CM

## 2019-09-16 DIAGNOSIS — D75.839 THROMBOCYTOSIS: Primary | ICD-10-CM

## 2019-09-16 DIAGNOSIS — G89.29 CHRONIC LOW BACK PAIN, UNSPECIFIED BACK PAIN LATERALITY, WITH SCIATICA PRESENCE UNSPECIFIED: ICD-10-CM

## 2019-09-16 NOTE — PROGRESS NOTES
[facility] [place] [facility] [place] [address] [address], [place]: [phone]  F: [phone]    Reason for Visit:   Rocky Hooper is a 62 y.o. female who is seen in consultation at the request of Dr. Salomon Warren for evaluation of elevated platelets. History of Present Illness: This is a 62 y.o. female who is seen in the office for elevated platelets. She is scheduled for a lumbar laminectomy with posterior spinal fusion of L4-L5 on Wednesday and needs to be cleared for surgery. She has been stressed over the last couple of weeks as she prepares for her surgery on Wednesday. She reports a headache and pain across her shoulders to her arms. This sounds stress related in nature. She has had both her her knees replaced in the past. She has been in good health otherwise. She does have a history of seizures since she was 1years old which she takes Dilantin for. She denies bleeding. She denies hemoptysis, hematemesis, melena, hematochezia, or hematuria. She denies problems with urination, constipation/diarrhea, shortness of breath. Her weight has been stable. Her mammogram is up to date and normal. She has never had a colonoscopy.      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    Seizures [facility])     since age 1 last seizure 2006; on dilantin (drug level checked annually by PCP)    Thrombocytosis ([address] 2019      Past Surgical History:   Procedure Laterality Date    HX BREAST BIOPSY Left     NEG    HX  SECTION      x 2    HX HEENT      LASIK    HX KNEE ARTHROSCOPY Bilateral     HX KNEE REPLACEMENT Right 2018    HX ORTHOPAEDIC Left     TKR    HX ORTHOPAEDIC Left     [address]    HX ORTHOPAEDIC Right     RTH    HX OTHER SURGICAL      BIOPSY LEFT LOWER LEG(AGE 23)    HX TONSILLECTOMY      HX TUBAL LIGATION        Social History     Tobacco Use    Smoking status: Never Smoker    Smokeless tobacco: Never Used   Substance Use Topics    Alcohol use: Yes     Comment: <1/wk      Family History   Problem Relation Age of Onset    Cancer Mother 48        breast cancer    Diabetes Mother     Anesth Problems Mother         PONV    Heart Disease Father     Hypertension Father     No Known Problems Brother      Current Outpatient Medications   Medication Sig    mupirocin (BACTROBAN) 2 % ointment by Both Nostrils route two (2) times a day for 7 days.  HYDROcodone-acetaminophen (NORCO) 5-325 mg per tablet Take  by mouth two (2) times daily as needed for Pain.  phenytoin (DILANTIN ER) 300 mg ER capsule Take 330 mg by mouth every morning.  diclofenac EC (VOLTAREN) 75 mg EC tablet Take 75 mg by mouth daily. No current facility-administered medications for this visit. No Known Allergies     Review of Systems: A complete review of systems was obtained, negative except as described above. Physical Exam:     Visit Vitals  /90   Pulse 68   Temp 97.6 °F (36.4 °C)   Resp 18   Ht 5' 8\" (1.727 m)   Wt 298 lb (135.2 kg)   SpO2 95%   BMI 45.31 kg/m²     General: No distress  Eyes: PERRLA, anicteric sclerae  HENT: Atraumatic, OP clear  Neck: Supple  Lymphatic: No cervical, supraclavicular, or axilla adenopathy  Respiratory: CTA, normal respiratory effort  CV: Normal rate, regular rhythm, no murmurs, +1 peripheral edema  GI: Soft, nontender, nondistended, no masses, obease  MS: Normal gait and station  Skin: Warm and dry.  No rashes, ecchymoses, or petechiae on exposed skin  Psych: Alert, oriented, appropriate affect, normal judgment/insight    Results:     Lab Results   Component Value Date/Time    WBC 6.2 09/11/2019 12:12 PM    HGB 12.9 09/11/2019 12:12 PM    HCT 41.9 09/11/2019 12:12 PM    PLATELET 130 (H) 05/36/8691 12:12 PM    .2 (H) 09/11/2019 12:12 PM Lab Results   Component Value Date/Time    Sodium 142 09/11/2019 12:12 PM    Potassium 4.3 09/11/2019 12:12 PM    Chloride 106 09/11/2019 12:12 PM    CO2 30 09/11/2019 12:12 PM    Glucose 89 09/11/2019 12:12 PM    BUN 22 (H) 09/11/2019 12:12 PM    Creatinine 0.74 09/11/2019 12:12 PM    GFR est AA >60 09/11/2019 12:12 PM    GFR est non-AA >60 09/11/2019 12:12 PM    Calcium 9.1 09/11/2019 12:12 PM    Glucose (POC) 115 (H) 04/14/2018 06:13 AM     No results found for: TBILI, ALT, SGOT, AP, TP, ALB, GLOB    Records reviewed and summarized above. Pathology report(s) reviewed above. Radiology report(s) reviewed above. 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 We will check her JAK2  We will check to be sure she is not deficient in iron causing her platelets to be elevated  I will also check for von Willebrand disease as you can see this occasionally with ET    2) Back pain  Patient scheduled for a lumbar laminectomy with posterior spinal fusion of L4-L5 on Wednesday    3) Shoulder/neck pain  Likely due to stress    4) Health maintenance  Her mammogram is up to date  She has never had a colonoscopy. I encouraged her to have this done once she recovers from her back surgery    Plan:     · Labs today: Iron profile, von Willebrand disease profile, JAK2  · Patient OK for surgery on Wednesday  · I would recommend she start ASA 81 mg daily once she is cleared by surgery  · I will see her back in the office in 2 weeks  · Probably check PTT before surgery      I appreciate the opportunity to participate in Ms. Valentine Sorensen's care.     Signed By: Dr. Domitila Manriquez

## 2019-09-16 NOTE — PROGRESS NOTES
Pt is a 62 yr old Pt here as a new Pt to discuss elevated Platelets,she is scheduled for back surgery on Wednesday. She is here with her  today and is nervous.     Visit Vitals  /90   Pulse 68   Temp 97.6 °F (36.4 °C)   Resp 18   Ht 5' 8\" (1.727 m)   Wt 298 lb (135.2 kg)   SpO2 95%   BMI 45.31 kg/m²       Pain Scale: 0 - No pain/10  Pain Location:

## 2019-09-17 ENCOUNTER — ANESTHESIA EVENT (OUTPATIENT)
Dept: SURGERY | Age: 54
DRG: 460 | End: 2019-09-17
Payer: COMMERCIAL

## 2019-09-18 ENCOUNTER — HOSPITAL ENCOUNTER (INPATIENT)
Age: 54
LOS: 4 days | Discharge: HOME HEALTH | DRG: 460 | End: 2019-09-22
Attending: ORTHOPAEDIC SURGERY | Admitting: ORTHOPAEDIC SURGERY
Payer: COMMERCIAL

## 2019-09-18 ENCOUNTER — ANESTHESIA (OUTPATIENT)
Dept: SURGERY | Age: 54
DRG: 460 | End: 2019-09-18
Payer: COMMERCIAL

## 2019-09-18 ENCOUNTER — APPOINTMENT (OUTPATIENT)
Dept: GENERAL RADIOLOGY | Age: 54
DRG: 460 | End: 2019-09-18
Attending: ORTHOPAEDIC SURGERY
Payer: COMMERCIAL

## 2019-09-18 DIAGNOSIS — M48.061 SPINAL STENOSIS OF LUMBAR REGION, UNSPECIFIED WHETHER NEUROGENIC CLAUDICATION PRESENT: Primary | ICD-10-CM

## 2019-09-18 LAB
GLUCOSE BLD STRIP.AUTO-MCNC: 93 MG/DL (ref 65–100)
SERVICE CMNT-IMP: NORMAL

## 2019-09-18 PROCEDURE — 77030012894

## 2019-09-18 PROCEDURE — 77030008684 HC TU ET CUF COVD -B: Performed by: ANESTHESIOLOGY

## 2019-09-18 PROCEDURE — 74011250636 HC RX REV CODE- 250/636: Performed by: ANESTHESIOLOGY

## 2019-09-18 PROCEDURE — 76060000037 HC ANESTHESIA 3 TO 3.5 HR: Performed by: ORTHOPAEDIC SURGERY

## 2019-09-18 PROCEDURE — C1713 ANCHOR/SCREW BN/BN,TIS/BN: HCPCS | Performed by: ORTHOPAEDIC SURGERY

## 2019-09-18 PROCEDURE — 74011250636 HC RX REV CODE- 250/636

## 2019-09-18 PROCEDURE — 74011000250 HC RX REV CODE- 250: Performed by: NURSE ANESTHETIST, CERTIFIED REGISTERED

## 2019-09-18 PROCEDURE — 76010000173 HC OR TIME 3 TO 3.5 HR INTENSV-TIER 1: Performed by: ORTHOPAEDIC SURGERY

## 2019-09-18 PROCEDURE — 74011250636 HC RX REV CODE- 250/636: Performed by: ORTHOPAEDIC SURGERY

## 2019-09-18 PROCEDURE — 3E0U0GB INTRODUCTION OF RECOMBINANT BONE MORPHOGENETIC PROTEIN INTO JOINTS, OPEN APPROACH: ICD-10-PCS | Performed by: ORTHOPAEDIC SURGERY

## 2019-09-18 PROCEDURE — 76210000006 HC OR PH I REC 0.5 TO 1 HR: Performed by: ORTHOPAEDIC SURGERY

## 2019-09-18 PROCEDURE — 77030037728 HC GRFT BN FBR CORT 3DEMIN 30CC BACT -I: Performed by: ORTHOPAEDIC SURGERY

## 2019-09-18 PROCEDURE — 0SG00K1 FUSION OF LUMBAR VERTEBRAL JOINT WITH NONAUTOLOGOUS TISSUE SUBSTITUTE, POSTERIOR APPROACH, POSTERIOR COLUMN, OPEN APPROACH: ICD-10-PCS | Performed by: ORTHOPAEDIC SURGERY

## 2019-09-18 PROCEDURE — 72020 X-RAY EXAM OF SPINE 1 VIEW: CPT

## 2019-09-18 PROCEDURE — 77030014007 HC SPNG HEMSTAT J&J -B: Performed by: ORTHOPAEDIC SURGERY

## 2019-09-18 PROCEDURE — 77030031139 HC SUT VCRL2 J&J -A: Performed by: ORTHOPAEDIC SURGERY

## 2019-09-18 PROCEDURE — 74011000250 HC RX REV CODE- 250

## 2019-09-18 PROCEDURE — 77030014647 HC SEAL FBRN TISSL BAXT -D: Performed by: ORTHOPAEDIC SURGERY

## 2019-09-18 PROCEDURE — 77030038600 HC TU BPLR IRR DISP STRY -B: Performed by: ORTHOPAEDIC SURGERY

## 2019-09-18 PROCEDURE — 77030026438 HC STYL ET INTUB CARD -A: Performed by: ANESTHESIOLOGY

## 2019-09-18 PROCEDURE — 77030040830 HC CATH URETH FOL MDII -A: Performed by: ORTHOPAEDIC SURGERY

## 2019-09-18 PROCEDURE — 82962 GLUCOSE BLOOD TEST: CPT

## 2019-09-18 PROCEDURE — 77030020782 HC GWN BAIR PAWS FLX 3M -B

## 2019-09-18 PROCEDURE — 77030004391 HC BUR FLUT MEDT -C: Performed by: ORTHOPAEDIC SURGERY

## 2019-09-18 PROCEDURE — 65270000029 HC RM PRIVATE

## 2019-09-18 PROCEDURE — 01NB0ZZ RELEASE LUMBAR NERVE, OPEN APPROACH: ICD-10-PCS | Performed by: ORTHOPAEDIC SURGERY

## 2019-09-18 PROCEDURE — 74011250636 HC RX REV CODE- 250/636: Performed by: PHYSICIAN ASSISTANT

## 2019-09-18 PROCEDURE — 77030012406 HC DRN WND PENRS BARD -A: Performed by: ORTHOPAEDIC SURGERY

## 2019-09-18 PROCEDURE — 77030013079 HC BLNKT BAIR HGGR 3M -A: Performed by: ANESTHESIOLOGY

## 2019-09-18 PROCEDURE — 77030018836 HC SOL IRR NACL ICUM -A: Performed by: ORTHOPAEDIC SURGERY

## 2019-09-18 PROCEDURE — 74011250637 HC RX REV CODE- 250/637: Performed by: PHYSICIAN ASSISTANT

## 2019-09-18 PROCEDURE — 77030003194 HC GRFT RECOMB BN MEDT -I1: Performed by: ORTHOPAEDIC SURGERY

## 2019-09-18 PROCEDURE — 74011250636 HC RX REV CODE- 250/636: Performed by: NURSE ANESTHETIST, CERTIFIED REGISTERED

## 2019-09-18 PROCEDURE — 77030040361 HC SLV COMPR DVT MDII -B: Performed by: ORTHOPAEDIC SURGERY

## 2019-09-18 PROCEDURE — 77030029099 HC BN WAX SSPC -A: Performed by: ORTHOPAEDIC SURGERY

## 2019-09-18 DEVICE — BONE GRAFT KIT 7510200 INFUSE SMALL
Type: IMPLANTABLE DEVICE | Site: SPINE LUMBAR | Status: FUNCTIONAL
Brand: INFUSE® BONE GRAFT

## 2019-09-18 DEVICE — GRAFT BNE 3D 30 CC CORTICAL FIBER: Type: IMPLANTABLE DEVICE | Site: SPINE LUMBAR | Status: FUNCTIONAL

## 2019-09-18 RX ORDER — OXYCODONE HYDROCHLORIDE 5 MG/1
5 TABLET ORAL
Status: DISCONTINUED | OUTPATIENT
Start: 2019-09-18 | End: 2019-09-22 | Stop reason: HOSPADM

## 2019-09-18 RX ORDER — ONDANSETRON 2 MG/ML
4 INJECTION INTRAMUSCULAR; INTRAVENOUS
Status: DISCONTINUED | OUTPATIENT
Start: 2019-09-18 | End: 2019-09-22 | Stop reason: HOSPADM

## 2019-09-18 RX ORDER — OXYCODONE HYDROCHLORIDE 5 MG/1
10 TABLET ORAL
Status: DISCONTINUED | OUTPATIENT
Start: 2019-09-18 | End: 2019-09-22 | Stop reason: HOSPADM

## 2019-09-18 RX ORDER — SODIUM CHLORIDE, SODIUM LACTATE, POTASSIUM CHLORIDE, CALCIUM CHLORIDE 600; 310; 30; 20 MG/100ML; MG/100ML; MG/100ML; MG/100ML
INJECTION, SOLUTION INTRAVENOUS
Status: DISCONTINUED | OUTPATIENT
Start: 2019-09-18 | End: 2019-09-18 | Stop reason: HOSPADM

## 2019-09-18 RX ORDER — VANCOMYCIN HYDROCHLORIDE 1 G/20ML
INJECTION, POWDER, LYOPHILIZED, FOR SOLUTION INTRAVENOUS AS NEEDED
Status: DISCONTINUED | OUTPATIENT
Start: 2019-09-18 | End: 2019-09-18 | Stop reason: HOSPADM

## 2019-09-18 RX ORDER — VANCOMYCIN/0.9 % SOD CHLORIDE 1.5G/250ML
1500 PLASTIC BAG, INJECTION (ML) INTRAVENOUS EVERY 12 HOURS
Status: COMPLETED | OUTPATIENT
Start: 2019-09-19 | End: 2019-09-19

## 2019-09-18 RX ORDER — PROPOFOL 10 MG/ML
INJECTION, EMULSION INTRAVENOUS AS NEEDED
Status: DISCONTINUED | OUTPATIENT
Start: 2019-09-18 | End: 2019-09-18 | Stop reason: HOSPADM

## 2019-09-18 RX ORDER — SUCCINYLCHOLINE CHLORIDE 20 MG/ML
INJECTION INTRAMUSCULAR; INTRAVENOUS AS NEEDED
Status: DISCONTINUED | OUTPATIENT
Start: 2019-09-18 | End: 2019-09-18 | Stop reason: HOSPADM

## 2019-09-18 RX ORDER — SODIUM CHLORIDE 0.9 % (FLUSH) 0.9 %
5-40 SYRINGE (ML) INJECTION EVERY 8 HOURS
Status: DISCONTINUED | OUTPATIENT
Start: 2019-09-18 | End: 2019-09-18

## 2019-09-18 RX ORDER — SODIUM CHLORIDE 0.9 % (FLUSH) 0.9 %
5-40 SYRINGE (ML) INJECTION EVERY 8 HOURS
Status: DISCONTINUED | OUTPATIENT
Start: 2019-09-18 | End: 2019-09-22 | Stop reason: HOSPADM

## 2019-09-18 RX ORDER — NALOXONE HYDROCHLORIDE 0.4 MG/ML
0.4 INJECTION, SOLUTION INTRAMUSCULAR; INTRAVENOUS; SUBCUTANEOUS AS NEEDED
Status: DISCONTINUED | OUTPATIENT
Start: 2019-09-18 | End: 2019-09-22 | Stop reason: HOSPADM

## 2019-09-18 RX ORDER — HYDROMORPHONE HCL/0.9% NACL/PF 0.5 MG/ML
PLASTIC BAG, INJECTION (ML) INTRAVENOUS
Status: DISCONTINUED | OUTPATIENT
Start: 2019-09-18 | End: 2019-09-19

## 2019-09-18 RX ORDER — ACETAMINOPHEN 325 MG/1
650 TABLET ORAL
Status: DISCONTINUED | OUTPATIENT
Start: 2019-09-18 | End: 2019-09-19

## 2019-09-18 RX ORDER — MUPIROCIN 20 MG/G
OINTMENT TOPICAL 2 TIMES DAILY
Status: DISCONTINUED | OUTPATIENT
Start: 2019-09-18 | End: 2019-09-22 | Stop reason: HOSPADM

## 2019-09-18 RX ORDER — SODIUM CHLORIDE 0.9 % (FLUSH) 0.9 %
5-40 SYRINGE (ML) INJECTION AS NEEDED
Status: DISCONTINUED | OUTPATIENT
Start: 2019-09-18 | End: 2019-09-18

## 2019-09-18 RX ORDER — FENTANYL CITRATE 50 UG/ML
25 INJECTION, SOLUTION INTRAMUSCULAR; INTRAVENOUS
Status: DISCONTINUED | OUTPATIENT
Start: 2019-09-18 | End: 2019-09-18 | Stop reason: HOSPADM

## 2019-09-18 RX ORDER — FENTANYL CITRATE 50 UG/ML
50 INJECTION, SOLUTION INTRAMUSCULAR; INTRAVENOUS AS NEEDED
Status: DISCONTINUED | OUTPATIENT
Start: 2019-09-18 | End: 2019-09-18 | Stop reason: HOSPADM

## 2019-09-18 RX ORDER — HYDROMORPHONE HCL/0.9% NACL/PF 0.5 MG/ML
PLASTIC BAG, INJECTION (ML) INTRAVENOUS
Status: COMPLETED
Start: 2019-09-18 | End: 2019-09-18

## 2019-09-18 RX ORDER — MIDAZOLAM HYDROCHLORIDE 1 MG/ML
1 INJECTION, SOLUTION INTRAMUSCULAR; INTRAVENOUS AS NEEDED
Status: DISCONTINUED | OUTPATIENT
Start: 2019-09-18 | End: 2019-09-18 | Stop reason: HOSPADM

## 2019-09-18 RX ORDER — LIDOCAINE HYDROCHLORIDE 20 MG/ML
INJECTION, SOLUTION EPIDURAL; INFILTRATION; INTRACAUDAL; PERINEURAL AS NEEDED
Status: DISCONTINUED | OUTPATIENT
Start: 2019-09-18 | End: 2019-09-18 | Stop reason: HOSPADM

## 2019-09-18 RX ORDER — DIPHENHYDRAMINE HYDROCHLORIDE 50 MG/ML
12.5 INJECTION, SOLUTION INTRAMUSCULAR; INTRAVENOUS
Status: ACTIVE | OUTPATIENT
Start: 2019-09-18 | End: 2019-09-19

## 2019-09-18 RX ORDER — FACIAL-BODY WIPES
10 EACH TOPICAL DAILY PRN
Status: DISCONTINUED | OUTPATIENT
Start: 2019-09-20 | End: 2019-09-22 | Stop reason: HOSPADM

## 2019-09-18 RX ORDER — ROCURONIUM BROMIDE 10 MG/ML
INJECTION, SOLUTION INTRAVENOUS AS NEEDED
Status: DISCONTINUED | OUTPATIENT
Start: 2019-09-18 | End: 2019-09-18 | Stop reason: HOSPADM

## 2019-09-18 RX ORDER — SODIUM CHLORIDE, SODIUM LACTATE, POTASSIUM CHLORIDE, CALCIUM CHLORIDE 600; 310; 30; 20 MG/100ML; MG/100ML; MG/100ML; MG/100ML
50 INJECTION, SOLUTION INTRAVENOUS CONTINUOUS
Status: DISCONTINUED | OUTPATIENT
Start: 2019-09-18 | End: 2019-09-18 | Stop reason: HOSPADM

## 2019-09-18 RX ORDER — HYDROMORPHONE HYDROCHLORIDE 2 MG/ML
INJECTION, SOLUTION INTRAMUSCULAR; INTRAVENOUS; SUBCUTANEOUS AS NEEDED
Status: DISCONTINUED | OUTPATIENT
Start: 2019-09-18 | End: 2019-09-18 | Stop reason: HOSPADM

## 2019-09-18 RX ORDER — HYDROMORPHONE HYDROCHLORIDE 1 MG/ML
0.2 INJECTION, SOLUTION INTRAMUSCULAR; INTRAVENOUS; SUBCUTANEOUS
Status: DISCONTINUED | OUTPATIENT
Start: 2019-09-18 | End: 2019-09-18 | Stop reason: HOSPADM

## 2019-09-18 RX ORDER — POLYETHYLENE GLYCOL 3350 17 G/17G
17 POWDER, FOR SOLUTION ORAL DAILY
Status: DISCONTINUED | OUTPATIENT
Start: 2019-09-19 | End: 2019-09-22 | Stop reason: HOSPADM

## 2019-09-18 RX ORDER — ONDANSETRON 2 MG/ML
4 INJECTION INTRAMUSCULAR; INTRAVENOUS AS NEEDED
Status: DISCONTINUED | OUTPATIENT
Start: 2019-09-18 | End: 2019-09-18 | Stop reason: HOSPADM

## 2019-09-18 RX ORDER — SODIUM CHLORIDE 0.9 % (FLUSH) 0.9 %
5-40 SYRINGE (ML) INJECTION AS NEEDED
Status: DISCONTINUED | OUTPATIENT
Start: 2019-09-18 | End: 2019-09-18 | Stop reason: HOSPADM

## 2019-09-18 RX ORDER — ONDANSETRON 2 MG/ML
INJECTION INTRAMUSCULAR; INTRAVENOUS AS NEEDED
Status: DISCONTINUED | OUTPATIENT
Start: 2019-09-18 | End: 2019-09-18 | Stop reason: HOSPADM

## 2019-09-18 RX ORDER — SODIUM CHLORIDE 0.9 % (FLUSH) 0.9 %
5-40 SYRINGE (ML) INJECTION AS NEEDED
Status: DISCONTINUED | OUTPATIENT
Start: 2019-09-18 | End: 2019-09-22 | Stop reason: HOSPADM

## 2019-09-18 RX ORDER — LIDOCAINE HYDROCHLORIDE 10 MG/ML
0.1 INJECTION, SOLUTION EPIDURAL; INFILTRATION; INTRACAUDAL; PERINEURAL AS NEEDED
Status: DISCONTINUED | OUTPATIENT
Start: 2019-09-18 | End: 2019-09-18 | Stop reason: HOSPADM

## 2019-09-18 RX ORDER — FENTANYL CITRATE 50 UG/ML
INJECTION, SOLUTION INTRAMUSCULAR; INTRAVENOUS AS NEEDED
Status: DISCONTINUED | OUTPATIENT
Start: 2019-09-18 | End: 2019-09-18 | Stop reason: HOSPADM

## 2019-09-18 RX ORDER — DIAZEPAM 5 MG/1
5 TABLET ORAL
Status: DISCONTINUED | OUTPATIENT
Start: 2019-09-18 | End: 2019-09-22 | Stop reason: HOSPADM

## 2019-09-18 RX ORDER — SODIUM CHLORIDE 0.9 % (FLUSH) 0.9 %
5-40 SYRINGE (ML) INJECTION EVERY 8 HOURS
Status: DISCONTINUED | OUTPATIENT
Start: 2019-09-18 | End: 2019-09-18 | Stop reason: HOSPADM

## 2019-09-18 RX ORDER — DEXAMETHASONE SODIUM PHOSPHATE 4 MG/ML
INJECTION, SOLUTION INTRA-ARTICULAR; INTRALESIONAL; INTRAMUSCULAR; INTRAVENOUS; SOFT TISSUE AS NEEDED
Status: DISCONTINUED | OUTPATIENT
Start: 2019-09-18 | End: 2019-09-18 | Stop reason: HOSPADM

## 2019-09-18 RX ORDER — VANCOMYCIN/0.9 % SOD CHLORIDE 1.5G/250ML
1500 PLASTIC BAG, INJECTION (ML) INTRAVENOUS ONCE
Status: COMPLETED | OUTPATIENT
Start: 2019-09-18 | End: 2019-09-18

## 2019-09-18 RX ORDER — MIDAZOLAM HYDROCHLORIDE 1 MG/ML
INJECTION, SOLUTION INTRAMUSCULAR; INTRAVENOUS AS NEEDED
Status: DISCONTINUED | OUTPATIENT
Start: 2019-09-18 | End: 2019-09-18 | Stop reason: HOSPADM

## 2019-09-18 RX ORDER — SODIUM CHLORIDE 9 MG/ML
125 INJECTION, SOLUTION INTRAVENOUS CONTINUOUS
Status: DISPENSED | OUTPATIENT
Start: 2019-09-18 | End: 2019-09-19

## 2019-09-18 RX ORDER — AMOXICILLIN 250 MG
1 CAPSULE ORAL 2 TIMES DAILY
Status: DISCONTINUED | OUTPATIENT
Start: 2019-09-18 | End: 2019-09-22 | Stop reason: HOSPADM

## 2019-09-18 RX ADMIN — SENNOSIDES, DOCUSATE SODIUM 1 TABLET: 50; 8.6 TABLET, FILM COATED ORAL at 21:25

## 2019-09-18 RX ADMIN — Medication 10 ML: at 21:25

## 2019-09-18 RX ADMIN — SODIUM CHLORIDE, SODIUM LACTATE, POTASSIUM CHLORIDE, AND CALCIUM CHLORIDE 50 ML/HR: 600; 310; 30; 20 INJECTION, SOLUTION INTRAVENOUS at 14:22

## 2019-09-18 RX ADMIN — ROCURONIUM BROMIDE 5 MG: 10 SOLUTION INTRAVENOUS at 16:06

## 2019-09-18 RX ADMIN — ROCURONIUM BROMIDE 20 MG: 10 SOLUTION INTRAVENOUS at 16:47

## 2019-09-18 RX ADMIN — FENTANYL CITRATE 100 MCG: 50 INJECTION, SOLUTION INTRAMUSCULAR; INTRAVENOUS at 16:06

## 2019-09-18 RX ADMIN — PROPOFOL 250 MG: 10 INJECTION, EMULSION INTRAVENOUS at 16:06

## 2019-09-18 RX ADMIN — ONDANSETRON HYDROCHLORIDE 4 MG: 2 INJECTION, SOLUTION INTRAMUSCULAR; INTRAVENOUS at 18:18

## 2019-09-18 RX ADMIN — SODIUM CHLORIDE, POTASSIUM CHLORIDE, SODIUM LACTATE AND CALCIUM CHLORIDE: 600; 310; 30; 20 INJECTION, SOLUTION INTRAVENOUS at 19:18

## 2019-09-18 RX ADMIN — VANCOMYCIN HYDROCHLORIDE 1500 MG: 10 INJECTION, POWDER, LYOPHILIZED, FOR SOLUTION INTRAVENOUS at 14:22

## 2019-09-18 RX ADMIN — SUCCINYLCHOLINE CHLORIDE 80 MG: 20 INJECTION, SOLUTION INTRAMUSCULAR; INTRAVENOUS at 16:08

## 2019-09-18 RX ADMIN — FENTANYL CITRATE 50 MCG: 50 INJECTION, SOLUTION INTRAMUSCULAR; INTRAVENOUS at 19:19

## 2019-09-18 RX ADMIN — PROPOFOL 50 MG: 10 INJECTION, EMULSION INTRAVENOUS at 18:56

## 2019-09-18 RX ADMIN — SODIUM CHLORIDE 125 ML/HR: 900 INJECTION, SOLUTION INTRAVENOUS at 20:06

## 2019-09-18 RX ADMIN — FENTANYL CITRATE 50 MCG: 50 INJECTION, SOLUTION INTRAMUSCULAR; INTRAVENOUS at 19:10

## 2019-09-18 RX ADMIN — HYDROMORPHONE HYDROCHLORIDE 1 MG: 2 INJECTION, SOLUTION INTRAMUSCULAR; INTRAVENOUS; SUBCUTANEOUS at 16:39

## 2019-09-18 RX ADMIN — ROCURONIUM BROMIDE 10 MG: 10 SOLUTION INTRAVENOUS at 17:17

## 2019-09-18 RX ADMIN — SUCCINYLCHOLINE CHLORIDE 140 MG: 20 INJECTION, SOLUTION INTRAMUSCULAR; INTRAVENOUS at 16:06

## 2019-09-18 RX ADMIN — ROCURONIUM BROMIDE 10 MG: 10 SOLUTION INTRAVENOUS at 17:53

## 2019-09-18 RX ADMIN — Medication: at 19:23

## 2019-09-18 RX ADMIN — PHENYLEPHRINE HYDROCHLORIDE 25 MCG/MIN: 10 INJECTION INTRAVENOUS at 16:57

## 2019-09-18 RX ADMIN — MUPIROCIN: 20 OINTMENT TOPICAL at 21:25

## 2019-09-18 RX ADMIN — SODIUM CHLORIDE, POTASSIUM CHLORIDE, SODIUM LACTATE AND CALCIUM CHLORIDE: 600; 310; 30; 20 INJECTION, SOLUTION INTRAVENOUS at 15:58

## 2019-09-18 RX ADMIN — LIDOCAINE HYDROCHLORIDE 60 MG: 20 INJECTION, SOLUTION EPIDURAL; INFILTRATION; INTRACAUDAL; PERINEURAL at 16:06

## 2019-09-18 RX ADMIN — ROCURONIUM BROMIDE 45 MG: 10 SOLUTION INTRAVENOUS at 16:18

## 2019-09-18 RX ADMIN — DEXAMETHASONE SODIUM PHOSPHATE 4 MG: 4 INJECTION, SOLUTION INTRAMUSCULAR; INTRAVENOUS at 16:18

## 2019-09-18 RX ADMIN — MIDAZOLAM 2 MG: 1 INJECTION INTRAMUSCULAR; INTRAVENOUS at 15:58

## 2019-09-18 NOTE — BRIEF OP NOTE
BRIEF OPERATIVE NOTE    Date of Procedure: 9/18/2019   Preoperative Diagnosis: SEVERE SPINAL STENOSIS L4-5  DEGENERATIVE SPONDYLOLISTHESIS  Postoperative Diagnosis: SEVERE SPINAL STENOSIS L4-5    DEGENERATIVE SPONDYLOLISTHESIS  Procedure(s):  LUMBAR LAMINECTOMY WITH POSTERIOR SPINAL FUSION L4-5  Surgeon(s) and Role:     * Devin Hlems MD - Primary         Surgical Assistant: Randi Whaley PA-C    Surgical Staff:  Circ-1: Kierra Laguna RN  Circ-Relief: Sandra Beaulieu RN  Physician Assistant: Michael Pierre PA-C  Scrub RN-1: Cecilia Loredo  Event Time In Time Out   Incision Start 1645    Incision Close       Anesthesia: General   Estimated Blood Loss: 200 ml  Specimens: None  Findings: SEVERE SPINAL STENOSIS L4-5, DEGENERATIVE SPONDYLOLISTHESIS  Complications: None  Implants:   Implant Name Type Inv.  Item Serial No.  Lot No. LRB No. Used Action   GRAFT BNE RECOMB HUM INFUS SM --  - SN/A  GRAFT BNE RECOMB HUM INFUS SM --  N/A [employer] [facility] DOV [id number] N/A 1 Implanted   GRAFT BNE BEN FIBERS 30CC -- 3DEMIN - [id number]  GRAFT BNE BEN FIBERS 30CC -- 3DEMIN [id number] [employer] N/A N/A 1 Implanted   6.5x45 screw   NA [employer] NA N/A 4 Implanted   caps   NA [employer] NA N/A 4 Implanted   40mm rods   NA [employer] NA N/A 2 Implanted

## 2019-09-18 NOTE — ANESTHESIA PREPROCEDURE EVALUATION
Anesthetic History   No history of anesthetic complications            Review of Systems / Medical History  Patient summary reviewed, nursing notes reviewed and pertinent labs reviewed    Pulmonary  Within defined limits                 Neuro/Psych     seizures: well controlled        Comments: dilanton Cardiovascular  Within defined limits                Exercise tolerance: >4 METS     GI/Hepatic/Renal  Within defined limits              Endo/Other        Morbid obesity and arthritis     Other Findings              Physical Exam    Airway  Mallampati: II  TM Distance: 4 - 6 cm  Neck ROM: normal range of motion   Mouth opening: Normal     Cardiovascular    Rhythm: regular  Rate: normal         Dental  No notable dental hx       Pulmonary  Breath sounds clear to auscultation               Abdominal  Abdominal exam normal       Other Findings            Anesthetic Plan    ASA: 3  Anesthesia type: general          Induction: Intravenous  Anesthetic plan and risks discussed with: Patient

## 2019-09-18 NOTE — ANESTHESIA POSTPROCEDURE EVALUATION
Procedure(s):  LUMBAR LAMINECTOMY WITH POSTERIOR SPINAL FUSION L4-5.    general    Anesthesia Post Evaluation      Multimodal analgesia: multimodal analgesia used between 6 hours prior to anesthesia start to PACU discharge  Patient location during evaluation: PACU  Patient participation: complete - patient participated  Level of consciousness: awake  Pain score: 2  Pain management: adequate  Airway patency: patent  Anesthetic complications: no  Cardiovascular status: acceptable  Respiratory status: acceptable  Hydration status: acceptable  Comments: I have evaluated the patient and meets criteria for discharge from PACU. Lanie June MD  Post anesthesia nausea and vomiting:  controlled      Vitals Value Taken Time   /68 9/18/2019  7:25 PM   Temp 36.1 °C (96.9 °F) 9/18/2019  7:18 PM   Pulse 72 9/18/2019  7:31 PM   Resp 16 9/18/2019  7:31 PM   SpO2 97 % 9/18/2019  7:31 PM   Vitals shown include unvalidated device data.

## 2019-09-19 LAB
ANION GAP SERPL CALC-SCNC: 5 MMOL/L (ref 5–15)
BUN SERPL-MCNC: 12 MG/DL (ref 6–20)
BUN/CREAT SERPL: 21 (ref 12–20)
CALCIUM SERPL-MCNC: 8.5 MG/DL (ref 8.5–10.1)
CHLORIDE SERPL-SCNC: 105 MMOL/L (ref 97–108)
CO2 SERPL-SCNC: 29 MMOL/L (ref 21–32)
CREAT SERPL-MCNC: 0.58 MG/DL (ref 0.55–1.02)
GLUCOSE BLD STRIP.AUTO-MCNC: 117 MG/DL (ref 65–100)
GLUCOSE SERPL-MCNC: 114 MG/DL (ref 65–100)
HGB BLD-MCNC: 12.5 G/DL (ref 11.5–16)
POTASSIUM SERPL-SCNC: 4 MMOL/L (ref 3.5–5.1)
SERVICE CMNT-IMP: ABNORMAL
SODIUM SERPL-SCNC: 139 MMOL/L (ref 136–145)

## 2019-09-19 PROCEDURE — 74011250637 HC RX REV CODE- 250/637: Performed by: PHYSICIAN ASSISTANT

## 2019-09-19 PROCEDURE — 36415 COLL VENOUS BLD VENIPUNCTURE: CPT

## 2019-09-19 PROCEDURE — 74011250636 HC RX REV CODE- 250/636: Performed by: PHYSICIAN ASSISTANT

## 2019-09-19 PROCEDURE — 77010033678 HC OXYGEN DAILY

## 2019-09-19 PROCEDURE — 74011250637 HC RX REV CODE- 250/637: Performed by: NURSE PRACTITIONER

## 2019-09-19 PROCEDURE — 65270000029 HC RM PRIVATE

## 2019-09-19 PROCEDURE — 97116 GAIT TRAINING THERAPY: CPT

## 2019-09-19 PROCEDURE — 97166 OT EVAL MOD COMPLEX 45 MIN: CPT

## 2019-09-19 PROCEDURE — 97530 THERAPEUTIC ACTIVITIES: CPT

## 2019-09-19 PROCEDURE — 85018 HEMOGLOBIN: CPT

## 2019-09-19 PROCEDURE — 97161 PT EVAL LOW COMPLEX 20 MIN: CPT

## 2019-09-19 PROCEDURE — 82962 GLUCOSE BLOOD TEST: CPT

## 2019-09-19 PROCEDURE — 80048 BASIC METABOLIC PNL TOTAL CA: CPT

## 2019-09-19 RX ORDER — ACETAMINOPHEN 500 MG
1000 TABLET ORAL
Status: DISCONTINUED | OUTPATIENT
Start: 2019-09-19 | End: 2019-09-22 | Stop reason: HOSPADM

## 2019-09-19 RX ADMIN — POLYETHYLENE GLYCOL 3350 17 G: 17 POWDER, FOR SOLUTION ORAL at 10:11

## 2019-09-19 RX ADMIN — ACETAMINOPHEN 1000 MG: 500 TABLET ORAL at 17:01

## 2019-09-19 RX ADMIN — OXYCODONE HYDROCHLORIDE 10 MG: 5 TABLET ORAL at 10:10

## 2019-09-19 RX ADMIN — Medication 10 ML: at 23:07

## 2019-09-19 RX ADMIN — SODIUM CHLORIDE 125 ML/HR: 900 INJECTION, SOLUTION INTRAVENOUS at 06:48

## 2019-09-19 RX ADMIN — SENNOSIDES, DOCUSATE SODIUM 1 TABLET: 50; 8.6 TABLET, FILM COATED ORAL at 10:11

## 2019-09-19 RX ADMIN — Medication 10 ML: at 06:13

## 2019-09-19 RX ADMIN — OXYCODONE HYDROCHLORIDE 10 MG: 5 TABLET ORAL at 23:07

## 2019-09-19 RX ADMIN — PHENYTOIN SODIUM 330 MG: 100 CAPSULE ORAL at 06:13

## 2019-09-19 RX ADMIN — OXYCODONE HYDROCHLORIDE 10 MG: 5 TABLET ORAL at 17:02

## 2019-09-19 RX ADMIN — DIAZEPAM 5 MG: 5 TABLET ORAL at 18:21

## 2019-09-19 RX ADMIN — OXYCODONE HYDROCHLORIDE 10 MG: 5 TABLET ORAL at 14:14

## 2019-09-19 RX ADMIN — VANCOMYCIN HYDROCHLORIDE 1500 MG: 10 INJECTION, POWDER, LYOPHILIZED, FOR SOLUTION INTRAVENOUS at 03:21

## 2019-09-19 RX ADMIN — SENNOSIDES, DOCUSATE SODIUM 1 TABLET: 50; 8.6 TABLET, FILM COATED ORAL at 17:02

## 2019-09-19 NOTE — PROGRESS NOTES
Problem: Mobility Impaired (Adult and Pediatric)  Goal: *Acute Goals and Plan of Care (Insert Text)  Description  FUNCTIONAL STATUS PRIOR TO ADMISSION: Patient was independent and active without use of DME.    HOME SUPPORT PRIOR TO ADMISSION: The patient lived with  but did not require assist.    Physical Therapy Goals  Initiated 9/19/2019    1. Patient will move from supine to sit and sit to supine  in bed with independence within 4 days. 2. Patient will perform sit to stand with modified independence within 4 days. 3. Patient will ambulate with modified independence for 150 feet with the least restrictive device within 4 days. 4. Patient will ascend/descend 5 stairs with 1 handrail(s) with modified independence within 4 days. 5. Patient will verbalize and demonstrate understanding of spinal precautions (No bending, lifting greater than 5 lbs, or twisting; log-roll technique; frequent repositioning as instructed) within 4 days. 9/19/2019 1457 by Pam Graham PT  Outcome: Progressing Towards Goal   PHYSICAL THERAPY TREATMENT  Patient: Xavier Vaca (58 y.o. female)  Date: 9/19/2019  Diagnosis: Spinal stenosis, lumbar [M48.061]  Lumbar stenosis [M48.061] <principal problem not specified>  Procedure(s) (LRB):  LUMBAR LAMINECTOMY WITH POSTERIOR SPINAL FUSION L4-5 (N/A) 1 Day Post-Op  Precautions: Back, Fall  Chart, physical therapy assessment, plan of care and goals were reviewed. ASSESSMENT  Patient is received supine in bed. She requires VC and Mod A for log roll technique. Patient requires additional time and assistance to scoot to EOB for sit<>stand. Patient comes to stand with CGA x2. She demonstrates safe sit<>stand transfer but demonstrates high anxiety with all mobility. Patient ambulates in and out of restroom with decreased gait speed and step clearance but overall CGA. Patient has been fit for custom brace and it should be delivered 9/19/19.  Once patient has custom brace she will need to ambulate increased distance, sit up in a chair for meals and ambulate to restroom. Current Level of Function Impacting Discharge (mobility/balance): Mod A bed mobility, CGA gait with RW    Other factors to consider for discharge: increased ambulation, stairs, medical         PLAN :  Patient continues to benefit from skilled intervention to address the above impairments. Continue treatment per established plan of care. to address goals. Recommendation for discharge: (in order for the patient to meet his/her long term goals)  Physical therapy at least 2 days/week in the home     This discharge recommendation:  Has been made in collaboration with the attending provider and/or case management    Equipment recommendations for successful discharge (if) home: patient owns DME required for discharge       SUBJECTIVE:   Patient stated I don't think I'm getting out of the bed well enough.     OBJECTIVE DATA SUMMARY:   Critical Behavior:              Functional Mobility Training:  Bed Mobility:  Rolling: Moderate assistance  Supine to Sit: Moderate assistance  Sit to Supine: Moderate assistance  Scooting: Moderate assistance;Assist x2        Transfers:  Sit to Stand: Contact guard assistance;Assist x2  Stand to Sit: Contact guard assistance;Assist x2                             Balance:  Sitting: Intact; With support  Standing: Intact; With support  Ambulation/Gait Training:  Distance (ft): 20 Feet (ft)  Assistive Device: Gait belt;Walker, rolling  Ambulation - Level of Assistance: Contact guard assistance        Gait Abnormalities: Decreased step clearance        Base of Support: Widened     Speed/Mary Kay: Pace decreased (<100 feet/min)  Step Length: Right shortened;Left shortened                    Stairs: Therapeutic Exercises:     Pain Rating:      Activity Tolerance:   Fair  Please refer to the flowsheet for vital signs taken during this treatment.     After treatment patient left in no apparent distress:   Supine in bed, Heels elevated for pressure relief, Call bell within reach, Caregiver / family present and Side rails x 3    COMMUNICATION/COLLABORATION:   The patients plan of care was discussed with: Occupational Therapist and Registered Nurse    Kaley Schwartz, PT   Time Calculation: 40 mins

## 2019-09-19 NOTE — ACP (ADVANCE CARE PLANNING)
requested for Advance Directive (AMD) per [facility] request.  Staff with patient providing care at the time of this visit. Will continue to follow up as needed and upon request as able. Visited by Rev. Everardo Martinez MDiv, [facility], [facility]   paging service: [phone])

## 2019-09-19 NOTE — PROGRESS NOTES
Problem: Self Care Deficits Care Plan (Adult)  Goal: *Acute Goals and Plan of Care (Insert Text)  Description  FUNCTIONAL STATUS PRIOR TO ADMISSION: Patient was independent did not use any DME. Past medical history significant for surgeries but has not needed assistance prior to admission. HOME SUPPORT: The patient lived with , but didn't need help prior. Occupational Therapy Goals  Initiated 9/19/2019    1. Patient will perform lower body dressing with supervision/set-up using AE PRN within 4 days. 2.  Patient will perform toileting with supervision/set-up using most appropriate DME within 4 days. 3.  Patient will grooming at modified independence within 4 days. 4.  Patient will don/doff back brace at supervision/set-up within 4 days. 5.  Patient will verbalize/demonstrate 3/3 back precautions during ADL tasks without cues within 4 days. Outcome: Progressing Towards Goal    OCCUPATIONAL THERAPY EVALUATION  Patient: Hannah Kebede (58 y.o. female)  Date: 9/19/2019  Primary Diagnosis: Spinal stenosis, lumbar [M48.061]  Lumbar stenosis [M48.061]  Procedure(s) (LRB):  LUMBAR LAMINECTOMY WITH POSTERIOR SPINAL FUSION L4-5 (N/A) 1 Day Post-Op   Precautions:   Back, Fall    ASSESSMENT  Based on the objective data described below, the patient presents with decreased mobility, pain control, and self-care independence secondary to back pain and precautions following above surgeries. Patient is independent at baseline and lives at home with her  for assistance as needed. Patient has TLSO orders, fitted today but not yet in room, safe to mobilize to and from bathroom, can't tolerate EOB LB dressing education and assessment today secondary to pain. Limited OOB tolerance and bed mobility noted. Anticipate with further pain control, decreased anxiety with movement and TLSO the patient was improve and be able to dc home with her .      Current Level of Function Impacting Discharge (ADLs/self-care): mod A for LB dressing, mod A (min A x2) for functional mobility with RW    Functional Outcome Measure: The patient scored 45/100 on the Barthel outcome measure which is indicative of moderate impairment in ADLs and functional mobility . Other factors to consider for discharge: pain control, assistance at home, DME needed? Patient will benefit from skilled therapy intervention to address the above noted impairments. PLAN :  Recommendations and Planned Interventions: self care training, functional mobility training, balance training, therapeutic activities, endurance activities, patient education, home safety training and family training/education    Frequency/Duration: Patient will be followed by occupational therapy 5 times a week to address goals. Recommendation for discharge: (in order for the patient to meet his/her long term goals)  To be determined: no needs versus HHOT depending on progress made prior to DC      This discharge recommendation:  Has not yet been discussed the attending provider and/or case management    Equipment recommendations for successful discharge (if) home: TBD, pt stated she has all needed AE for LB dressing        SUBJECTIVE:   Patient stated I am trying to look on the bright side but this hurts.     OBJECTIVE DATA SUMMARY:   HISTORY:   Past Medical History:   Diagnosis Date    Arthritis     Chronic pain     bilateral hips and right knee    Morbid obesity with BMI of 45.0-49.9, adult ([address] 2016    Motion sickness     when not eating    MRSA carrier 2019    Seizures ([address]     since age 1 last seizure 2006; on dilantin (drug level checked annually by PCP)    Thrombocytosis ([address] 2019     Past Surgical History:   Procedure Laterality Date    HX BREAST BIOPSY Left     NEG    HX  SECTION      x 2    HX HEENT      LASIK    HX KNEE ARTHROSCOPY Bilateral     HX KNEE REPLACEMENT Right 2018    HX ORTHOPAEDIC Left     TKR    HX ORTHOPAEDIC Left 2016    [address]    HX ORTHOPAEDIC Right     RTH    HX OTHER SURGICAL      BIOPSY LEFT LOWER LEG(AGE 23)    HX TONSILLECTOMY      HX TUBAL LIGATION         Expanded or extensive additional review of patient history:     Home Situation  Home Environment: Private residence  # Steps to Enter: 5  Rails to Enter: Yes  Hand Rails : Right  One/Two Story Residence: Two story, live on 1st floor  Lift Chair Available: No  Living Alone: No  Support Systems: Family member(s), Child(misha), Spouse/Significant Other/Partner, Friends \ neighbors  Patient Expects to be Discharged to[de-identified] Private residence  Current DME Used/Available at Home: [address] [facility], [place], straight, Safety frame [employer], Walker    Hand dominance: Right    EXAMINATION OF PERFORMANCE DEFICITS:  Cognitive/Behavioral Status:  Neurologic State: Alert  Orientation Level: Oriented X4  Cognition: Appropriate decision making  Perception: Appears intact  Perseveration: No perseveration noted  Safety/Judgement: Awareness of environment    Skin: all visible areas intact     Edema: none noted    Hearing: Auditory  Auditory Impairment: None    Vision/Perceptual:                           Acuity: Within Defined Limits         Range of Motion:    AROM: Within functional limits  PROM: Within functional limits                      Strength:    Strength: Generally decreased, functional                Coordination:  Coordination: Within functional limits  Fine Motor Skills-Upper: Left Intact; Right Intact    Gross Motor Skills-Upper: Left Intact; Right Intact    Tone & Sensation:    Tone: Normal  Sensation: Intact                      Balance:  Sitting: Intact; With support  Standing: Intact; With support    Functional Mobility and Transfers for ADLs:  Bed Mobility:  Rolling: Moderate assistance  Supine to Sit: Moderate assistance  Sit to Supine: Moderate assistance  Scooting:  Moderate assistance;Assist x2    Transfers:  Sit to Stand: Minimum assistance;Assist x2  Stand to Sit: Contact guard assistance  Toilet Transfer : Minimum assistance;Assist x2  Assistive Device : Walker, rolling    ADL Assessment:  Feeding: Setup    Oral Facial Hygiene/Grooming: Setup    Bathing: Moderate assistance    Upper Body Dressing: Minimum assistance    Lower Body Dressing: Moderate assistance    Toileting: Moderate assistance                ADL Intervention and task modifications:  Patient instructed and demonstrated 3/3 back precautions with minimal cues. Progressed from supine in bed to EOB for education on precautions and discussion about LB dressing, pt unable to tolerate secondary to pain. Progressed to bathroom for attempts at standing grooming, pt is able to take 1 UE from RW but needs CGA to complete and cues not to cross body and break precaution. Cognitive Retraining  Safety/Judgement: Awareness of environment    Patient instructed and indicated understanding the benefits of maintaining activity tolerance, functional mobility, and independence with self care tasks during acute stay  to ensure safe return home and to baseline. Encouraged patient to increase frequency and duration OOB, not sitting longer than 30 mins without marching/walking with staff, be out of bed for all meals, perform daily ADLs (as approved by RN/MD regarding bathing etc), and performing functional mobility to/from bathroom. Patient instruction and indicated understanding on body mechanics, ergonomics and gravitational force on the spine during different body positions to plan activities in prep for return home to complete basic ADLs, instrumental ADLs and back to work safely. Therapeutic Exercise:  Patient instructed on the benefits shoulder flexion exercises to increase independence with ADLs, active ROM, and strength of spine.    Bed Mobility with min to mod A x2, decreased tolerance for activity secondary to pain    Functional Measure:  Barthel Index:    Bathin  Bladder: 0  Bowels: 10  Groomin  Dressing: 5  Feeding: 10  Mobility: 5  Stairs: 0  Toilet Use: 5  Transfer (Bed to Chair and Back): 5  Total: 45/100        The Barthel ADL Index: Guidelines  1. The index should be used as a record of what a patient does, not as a record of what a patient could do. 2. The main aim is to establish degree of independence from any help, physical or verbal, however minor and for whatever reason. 3. The need for supervision renders the patient not independent. 4. A patient's performance should be established using the best available evidence. Asking the patient, friends/relatives and nurses are the usual sources, but direct observation and common sense are also important. However direct testing is not needed. 5. Usually the patient's performance over the preceding 24-48 hours is important, but occasionally longer periods will be relevant. 6. Middle categories imply that the patient supplies over 50 per cent of the effort. 7. Use of aids to be independent is allowed. Edson Remy., Barthel, D.W. ([id number]). Functional evaluation: the Barthel Index. [address] [facility] (14)2. [facility] PASTORA Smith, Tanya Keita., Todd Knapp., Cherise Celis, [address] [place] [address] (1999). Measuring the change indisability after inpatient rehabilitation; comparison of the responsiveness of the Barthel Index and Functional Coffee Measure. Journal of Neurology, Neurosurgery, and Psychiatry, 66(4), 597-752. PAT Rodriguez.PARESH, MARI Estevez, & Rafa Huizar M.A. (2004.) Assessment of post-stroke quality of life in cost-effectiveness studies: The usefulness of the Barthel Index and the EuroQoL-5D.  Quality of Life Research, 15, 219-66        Occupational Therapy Evaluation Charge Determination   History Examination Decision-Making   MEDIUM Complexity : Expanded review of history including physical, cognitive and psychosocial  history  MEDIUM Complexity : 3-5 performance deficits relating to physical, cognitive , or psychosocial skils that result in activity limitations and / or participation restrictions MEDIUM Complexity : Patient may present with comorbidities that affect occupational performnce. Miniml to moderate modification of tasks or assistance (eg, physical or verbal ) with assesment(s) is necessary to enable patient to complete evaluation       Based on the above components, the patient evaluation is determined to be of the following complexity level: MEDIUM  Pain Ratin/10 seated, RN present to provide pain medication during session    Activity Tolerance:   Good  Please refer to the flowsheet for vital signs taken during this treatment. After treatment patient left in no apparent distress:    Supine in bed and Call bell within reach    COMMUNICATION/EDUCATION:   The patients plan of care was discussed with: Physical Therapist and Registered Nurse. Home safety education was provided and the patient/caregiver indicated understanding., Patient/family have participated as able in goal setting and plan of care. and Patient/family agree to work toward stated goals and plan of care. This patients plan of care is appropriate for delegation to hospitals.     Thank you for this referral.  Annette Olivo  Time Calculation: 40 mins

## 2019-09-19 NOTE — PROGRESS NOTES
Problem: Risk for Spread of Infection  Goal: Prevent transmission of infectious organism to others  Description  Prevent the transmission of infectious organisms to other patients, staff members, and visitors. Outcome: Progressing Towards Goal     Problem: Patient Education:  Go to Education Activity  Goal: Patient/Family Education  Outcome: Progressing Towards Goal     Problem: Falls - Risk of  Goal: *Absence of Falls  Description  Document Grant Araujo Fall Risk and appropriate interventions in the flowsheet.   Outcome: Progressing Towards Goal  Note:   Fall Risk Interventions:  Mobility Interventions: Communicate number of staff needed for ambulation/transfer, OT consult for ADLs, Patient to call before getting OOB, PT Consult for mobility concerns, PT Consult for assist device competence, Utilize walker, cane, or other assistive device         Medication Interventions: Patient to call before getting OOB, Teach patient to arise slowly    Elimination Interventions: Call light in reach, Patient to call for help with toileting needs, Toileting schedule/hourly rounds              Problem: Patient Education: Go to Patient Education Activity  Goal: Patient/Family Education  Outcome: Progressing Towards Goal     Problem: Complex Spine Procedure:  Post Op Day 1  Goal: Off Pathway (Use only if patient is Off Pathway)  Outcome: Progressing Towards Goal  Goal: Activity/Safety  Outcome: Progressing Towards Goal  Goal: Consults, if ordered  Outcome: Progressing Towards Goal  Goal: Diagnostic Test/Procedures  Outcome: Progressing Towards Goal  Goal: Nutrition/Diet  Outcome: Progressing Towards Goal  Goal: Discharge Planning  Outcome: Progressing Towards Goal  Goal: Medications  Outcome: Progressing Towards Goal  Goal: Respiratory  Outcome: Progressing Towards Goal  Goal: Treatments/Interventions/Procedures  Outcome: Progressing Towards Goal  Goal: Psychosocial  Outcome: Progressing Towards Goal  Goal: *Progress independence mobility/activities (eg: Mobility precautions)  Outcome: Progressing Towards Goal  Goal: *Verbalizes/demonstrates understanding of proper body mechanics and use of stabilization device if ordered  Outcome: Progressing Towards Goal  Goal: *Optimal pain control at patient's stated goal  Outcome: Progressing Towards Goal  Goal: *Resumes normal function of bladder and bowel  Outcome: Progressing Towards Goal  Goal: *Hemodynamically stable  Outcome: Progressing Towards Goal  Goal: *Tolerating diet  Outcome: Progressing Towards Goal  Goal: *Labs within defined limits  Outcome: Progressing Towards Goal

## 2019-09-19 NOTE — PROGRESS NOTES
Problem: Mobility Impaired (Adult and Pediatric)  Goal: *Acute Goals and Plan of Care (Insert Text)  Description  FUNCTIONAL STATUS PRIOR TO ADMISSION: Patient was independent and active without use of DME.    HOME SUPPORT PRIOR TO ADMISSION: The patient lived with  but did not require assist.    Physical Therapy Goals  Initiated 9/19/2019    1. Patient will move from supine to sit and sit to supine  in bed with independence within 4 days. 2. Patient will perform sit to stand with modified independence within 4 days. 3. Patient will ambulate with modified independence for 150 feet with the least restrictive device within 4 days. 4. Patient will ascend/descend 5 stairs with 1 handrail(s) with modified independence within 4 days. 5. Patient will verbalize and demonstrate understanding of spinal precautions (No bending, lifting greater than 5 lbs, or twisting; log-roll technique; frequent repositioning as instructed) within 4 days. Outcome: Progressing Towards Goal   PHYSICAL THERAPY EVALUATION  Patient: Tova Pope (58 y.o. female)  Date: 9/19/2019  Primary Diagnosis: Spinal stenosis, lumbar [M48.061]  Lumbar stenosis [M48.061]  Procedure(s) (LRB):  LUMBAR LAMINECTOMY WITH POSTERIOR SPINAL FUSION L4-5 (N/A) 1 Day Post-Op   Precautions:   Back, Fall      ASSESSMENT  Based on the objective data described below, the patient presents with decreased ROM, decreased strength, and decreased independence with functional mobility post-op day 1 lumbar laminectomy with posterior spinal fusion L4-L5. Patient PMHx is remarkable for bilateral hip and knee replacement surgeries. Patient is to be fit by orthotist for custom back brace. At time of evaluation patient does not have brace but is allowed limited OOB mobility. Patient education is provided on back precautions and log roll technique. Patient demonstrates high anxiety. She is able to ambulate around the room with rolling walker and CGA.      Current Level of Function Impacting Discharge (mobility/balance): Min-Mod A bed mobility supine<>sit, CGA gait with RW    Functional Outcome Measure: The patient scored 45/100 on the Barthel outcome measure which is indicative of need for assistance with greater than 50% of ADLs and IADLs. Other factors to consider for discharge: stair/gait training, brace     Patient will benefit from skilled therapy intervention to address the above noted impairments. PLAN :  Recommendations and Planned Interventions: bed mobility training, transfer training, gait training, therapeutic exercises, neuromuscular re-education, edema management/control, patient and family training/education and therapeutic activities      Frequency/Duration: Patient will be followed by physical therapy:  twice daily to address goals. Recommendation for discharge: (in order for the patient to meet his/her long term goals)  Physical therapy at least 2 days/week in the home     This discharge recommendation:  Has not yet been discussed the attending provider and/or case management    Equipment recommendations for successful discharge (if) home: patient owns DME required for discharge         SUBJECTIVE:   Patient stated I just need to get myself together.     OBJECTIVE DATA SUMMARY:   HISTORY:    Past Medical History:   Diagnosis Date    Arthritis     Chronic pain     bilateral hips and right knee    Morbid obesity with BMI of 45.0-49.9, adult ([address] 2016    Motion sickness     when not eating    MRSA carrier 2019    Seizures ([address]     since age 1 last seizure ; on dilantin (drug level checked annually by PCP)    Thromb[facility] 2019     Past Surgical History:   Procedure Laterality Date    HX BREAST BIOPSY Left     NEG    HX  SECTION      x 2    HX HEENT      LASIK    HX KNEE ARTHROSCOPY Bilateral     HX KNEE REPLACEMENT Right 2018    HX ORTHOPAEDIC Left     TKR    HX ORTHOPAEDIC Left     [address] [place] [address] ORTHOPAEDIC Right     RTH    HX OTHER SURGICAL      BIOPSY LEFT LOWER LEG(AGE 23)    HX TONSILLECTOMY      HX TUBAL LIGATION         Personal factors and/or comorbidities impacting plan of care: please see above    Home Situation  Home Environment: Private residence  # Steps to Enter: 5  Rails to Enter: Yes  Hand Rails : Right  One/Two Story Residence: Two story, live on 1st floor  Lift Chair Available: No  Living Alone: No  Support Systems: Family member(s), Child(misha), Spouse/Significant Other/Partner, Friends \ neighbors  Patient Expects to be Discharged to[de-identified] Private residence  Current DME Used/Available at Home: brianna Mejia, Safety frame [employer], Walker    EXAMINATION/PRESENTATION/DECISION MAKING:   Critical Behavior:              Hearing: Auditory  Auditory Impairment: None  Skin:    Edema:   Range Of Motion:                          Strength: Tone & Sensation:                                  Coordination:     Vision:      Functional Mobility:  Bed Mobility:  Rolling: Maximum assistance  Supine to Sit: Moderate assistance  Sit to Supine: Moderate assistance  Scooting: Maximum assistance  Transfers:  Sit to Stand: Minimum assistance;Assist x2  Stand to Sit: Minimum assistance;Assist x2                       Balance:   Sitting: Intact; With support  Standing: Intact; With support  Ambulation/Gait Training:  Distance (ft): 20 Feet (ft)  Assistive Device: Gait belt;Walker, rolling  Ambulation - Level of Assistance: Contact guard assistance        Gait Abnormalities: Decreased step clearance        Base of Support: Widened     Speed/Mary Kay: Pace decreased (<100 feet/min)  Step Length: Right shortened;Left shortened                     Stairs:               Therapeutic Exercises:       Functional Measure:  Barthel Index:    Bathin  Bladder: 0  Bowels: 10  Groomin  Dressin  Feeding: 10  Mobility: 5  Stairs: 0  Toilet Use: 5  Transfer (Bed to Chair and Back): 5  Total: 45/100       The Barthel ADL Index: Guidelines  1. The index should be used as a record of what a patient does, not as a record of what a patient could do. 2. The main aim is to establish degree of independence from any help, physical or verbal, however minor and for whatever reason. 3. The need for supervision renders the patient not independent. 4. A patient's performance should be established using the best available evidence. Asking the patient, friends/relatives and nurses are the usual sources, but direct observation and common sense are also important. However direct testing is not needed. 5. Usually the patient's performance over the preceding 24-48 hours is important, but occasionally longer periods will be relevant. 6. Middle categories imply that the patient supplies over 50 per cent of the effort. 7. Use of aids to be independent is allowed. Ramón Deutsch., Barthel, D.W. ([id number]). Functional evaluation: the Barthel Index. [address] [facility] (14)2. Angelo Lucas ajay PASTORA Freed, Giselle Crockett., [facility] [place], [address] [facility] (1999). Measuring the change indisability after inpatient rehabilitation; comparison of the responsiveness of the Barthel Index and Functional [place] Measure. Journal of Neurology, Neurosurgery, and Psychiatry, 66(4), 403-041. Bia Barraza, N.J.A, MARI Estevez, & Aisha Pelaez MSAMMY. (2004.) Assessment of post-stroke quality of life in cost-effectiveness studies: The usefulness of the Barthel Index and the EuroQoL-5D.  Quality of Life Research, 15, 141-84            Physical Therapy Evaluation Charge Determination   History Examination Presentation Decision-Making   MEDIUM  Complexity : 1-2 comorbidities / personal factors will impact the outcome/ POC  LOW Complexity : 1-2 Standardized tests and measures addressing body structure, function, activity limitation and / or participation in recreation  LOW Complexity : Stable, uncomplicated  Other outcome measures Barthel  MEDIUM      Based on the above components, the patient evaluation is determined to be of the following complexity level: MEDIUM    Pain Rating:      Activity Tolerance:   Patient demonstrates fair activity tolerance   Please refer to the flowsheet for vital signs taken during this treatment. After treatment patient left in no apparent distress:   Supine in bed, Call bell within reach, Caregiver / family present and Side rails x 3    COMMUNICATION/EDUCATION:   The patients plan of care was discussed with: Registered Nurse. Fall prevention education was provided and the patient/caregiver indicated understanding., Patient/family have participated as able in goal setting and plan of care. and Patient/family agree to work toward stated goals and plan of care.     Thank you for this referral.  Andres Raymond, PT   Time Calculation: 51 mins

## 2019-09-19 NOTE — PROGRESS NOTES
Patient chart reviewed, pleasantly refused therapy at this time after lunch just arrived. Will attempt later today.     Thank you   Duc Lee MS OTR/L

## 2019-09-19 NOTE — OP NOTES
[address]   OPERATIVE REPORT    Name:  Ge Castillo  MR#:  [id number]  :  1965  ACCOUNT #:  [de-identified]  DATE OF SERVICE:      PREOPERATIVE DIAGNOSES:  Severe lumbar stenosis, L4-5 with associated degenerative spondylolisthesis and severe left-sided lumbar radiculopathy. POSTOPERATIVE DIAGNOSES:  Severe lumbar stenosis, L4-5 with associated degenerative spondylolisthesis and severe left-sided lumbar radiculopathy. PROCEDURES PERFORMED:  1. Lumbar laminectomy, L4-L5 and bilateral decompression, L4-L5 and S1 nerve roots. 2.  Bilateral lateral fusion, L4-5 using K2M spinal instrumentation supplemented with cancellous allograft and bone morphogenic protein (infuse). SURGEON:  Wili Espinoza MD    ASSISTANT:  Madison Martinez PA-C    ANESTHESIA:  General.    COMPLICATIONS:  None    SPECIMENS REMOVED:  None    IMPLANTS:  Documented On Brief Op Note    ESTIMATED BLOOD LOSS:  Documented On Brief Op Note    INDICATIONS:  A 62year-old morbidly obese patient, weighing over 280 pounds, seen with complaints of back, hip, and leg pain to the left side predominantly with some pain to the right. Given the severity of complaints and failure to improve with conservative measures, ultimately had an MRI scan completed, demonstrating severe canal narrowing with associated synovial cyst on the symptomatic left side at that level. She has large joint effusions and a degenerative spondylolisthesis at the same segment. Given the severity of complaint, those finding clinically and radiologically, a lumbar decompression and spinal fusion offered. Potential benefits and complications reviewed. PROCEDURE:  The patient was brought to the operating room in the supine position. General anesthetic administered. The patient was placed in prone position on the Jude type frame. The low back region was then prepped and draped in normal fashion. Preoperative antibiotics were administered. Thigh-high LINA hose and sequential pumps applied to the patient's lower extremities. An incision was made extending from about L3 down to the sacrum. Subcutaneous tissue was then divided in line with the incision down to the level of the fascia. The fascia was incised in the midline and a subperiosteal dissection eventually completed over the spinous process and laminar surfaces. Eventually exposed the transverse processes, L4-5. It should be noted the depth of the wound was notable. The patient probably had 8 to 10 cm of fatty tissue just to get to the fascia. Beyond that, I would say the depth of the wound was in the neighborhood of 20 cm. We really did not have a retractor blade that worked well. We certainly made the surgical procedure a bit more difficult. We did obtain good hemostasis eventually and packed the walls off with some 4 x 4 sponges. I then completed the laminectomy of L4 and L5. [id number]-[id number] decompression ensued. The partial medial facetectomy at L4-5 particularly decompressed the L4, L5, and S1 nerve roots. There was notable narrowing obviously at the L4-5 level, worse to the symptomatic left side related to the synovial cyst that was eventually peeled off the dura without any particular difficulties. There were some adhesions so we had no leakage of spinal fluid. Foraminotomy completed bilaterally over the L4 nerve roots. The L5 and S1 roots as well visualized and decompressed. Once the decompression was completed, I did use a FloSeal for the purposes of hemostasis. Facet joint capsule at L4-5 resected. Drill holes placed into the pedicle of L4 and L5. Pedicles cannulated. Markers placed. X-rays taken to verify the position and direction. A 30 mL of cancellous allograft utilized and finger packed over the decorticated surfaces to include the transverse processes as well as the facet joint. Screws measuring 6.5 mm in diameter and 45 mm in length with good purchase. These were then connected with the celena, that was appropriately contoured, secured using a locking cap and final  device. Final x-ray was taken showing good position of the instrumentation. A small kit of bone morphogenic protein utilized. Sponges mixed amongst the bone graft. Drain was placed. Fascia was closed using #1 Vicryl. Subcutaneous tissues and skin closed using 2-0 and 3-0 Vicryl. The patient was then awoken from the anesthetic, extubated, and taken to the Recovery in stable condition. It should be noted that this operative procedure took, I would say was twice as difficult to perform related to her obesity, probably it took twice as long due to the depth of the wound, maybe operative procedure that much more challenging.       Ricardo Potter MD      [id number]  D:  09/18/2019 18:25  T:  09/19/2019 2:18  JOB #:  [id number]

## 2019-09-19 NOTE — ROUTINE PROCESS
Bedside and Verbal shift change report given to Javier Pearson (oncoming nurse) by Parker Rivera (offgoing nurse). Report included the following information SBAR.

## 2019-09-19 NOTE — PROGRESS NOTES
Problem: Risk for Spread of Infection  Goal: Prevent transmission of infectious organism to others  Description  Prevent the transmission of infectious organisms to other patients, staff members, and visitors. Outcome: Progressing Towards Goal     Problem: Falls - Risk of  Goal: *Absence of Falls  Description  Document Jumana Raymond Fall Risk and appropriate interventions in the flowsheet.   Outcome: Progressing Towards Goal  Note:   Fall Risk Interventions:  Mobility Interventions: Patient to call before getting OOB, PT Consult for mobility concerns, PT Consult for assist device competence, OT consult for ADLs         Medication Interventions: Teach patient to arise slowly, Patient to call before getting OOB    Elimination Interventions: Call light in reach, Patient to call for help with toileting needs, Stay With Me (per policy)              Problem: Simple Spine Procedure:  Day of Surgery  Goal: Activity/Safety  Outcome: Progressing Towards Goal  Goal: Nutrition/Diet  Outcome: Progressing Towards Goal  Goal: Discharge Planning  Outcome: Progressing Towards Goal  Goal: Medications  Outcome: Progressing Towards Goal  Goal: Respiratory  Outcome: Progressing Towards Goal  Goal: Treatments/Interventions/Procedures  Outcome: Progressing Towards Goal  Goal: Psychosocial  Outcome: Progressing Towards Goal  Goal: *Verbalizes understanding of type and use of pain medication  Outcome: Progressing Towards Goal  Goal: *Optimal pain control at patient's stated goal  Outcome: Progressing Towards Goal  Goal: *Verbalizes/demonstrates understanding of proper body mechanics and use of stabilization device if ordered  Outcome: Progressing Towards Goal  Goal: *Activity level attained as ordered  Outcome: Progressing Towards Goal  Goal: *Active bowel sounds  Outcome: Progressing Towards Goal  Goal: *Respiratory status stable  Outcome: Progressing Towards Goal  Goal: *Adequate urinary output  Outcome: Progressing Towards Goal  Goal: *Hemodynamically stable  Outcome: Progressing Towards Goal

## 2019-09-19 NOTE — PERIOP NOTES
TRANSFER - OUT REPORT:    Verbal report given to Sruthi Euceda RN(name) on Dannie Gilmore  being transferred to (unit) for routine post - op       Report consisted of patients Situation, Background, Assessment and   Recommendations(SBAR). Time Pre op antibiotic given:1809  Anesthesia Stop time: 1919  Adams Present on Transfer to floor:yes  Order for Adams on Chart:yes  Discharge Prescriptions with Chart:no    Information from the following report(s) SBAR, Kardex, OR Summary, Procedure Summary, Intake/Output, MAR, Accordion, Recent Results and Cardiac Rhythm NSR was reviewed with the receiving nurse. Opportunity for questions and clarification was provided. Is the patient on 02? YES      Is the patient on a monitor? NO    Is the nurse transporting with the patient? NO    Surgical Waiting Area notified of patient's transfer from PACU?  YES      The following personal items collected during your admission accompanied patient upon transfer:   Dental Appliance:    Vision:    Hearing Aid:    Jewelry:    Clothing: Clothing: With patient(clothing returned in PACU)  Other Valuables:    Valuables sent to safe:

## 2019-09-19 NOTE — PROGRESS NOTES
visit for initial spiritual assessment and Advance Directive (AMD).  requested for Advance Directive per [facility] request.  Staff with patient providing care at the time of this visit. Will continue to follow up as needed and upon request as able. Visited by Rev. Bobby Mosley MDiv, [facility], [facility]   paging service: [phone][facility]

## 2019-09-19 NOTE — PROGRESS NOTES
BRO: Referral sent to [facility]. Care Management Interventions  PCP Verified by CM: Yes  Palliative Care Criteria Met (RRAT>21 & CHF Dx)?: No  Mode of Transport at Discharge: Self  Transition of Care Consult (CM Consult): [address]: No  Reason Outside [place]: Out of service area  [address] [place]: No  Discharge Durable Medical Equipment: No  Physical Therapy Consult: Yes  Occupational Therapy Consult: Yes  Speech Therapy Consult: No  Current Support Network: Lives with Spouse  Confirm Follow Up Transport: Family  Plan discussed with Pt/Family/Caregiver: Yes  Freedom of Choice Offered: Yes  Discharge Location  Discharge Placement: Home with home health    Reason for [address] L4-5:                      RRAT Score: 5                    Plan for utilizing home health:    [place] of choice offered. Patient requested [facility]. Current Advanced Directive/Advance Care Plan: Adv. Directive not on file. Transition of Care Plan:    Home with home health. CM met with patient to discuss discharge plans. Patient lives with her  in [place]. She has had several other surgeries so she has all of the equipment she needs at home. She has used [facility] in the past and has requested them again. Referral sent.     Thomas Villegas, FEMI/CRM

## 2019-09-19 NOTE — PROGRESS NOTES
Ortho Spine Daily Progress Note    Date of Surgery:  2019      Patient: Melony Eddy   YOB: 1965  Age: 62 y.o. SUBJECTIVE:   1 Day Post-Op following LUMBAR LAMINECTOMY WITH POSTERIOR SPINAL FUSION L4-5    The patient states significant back pain this morning. The patient states that their pre-operative lower extremity symptoms appear to be improved. The patient rates their current level of pain as 7/10. The patient's post operative pain is adequately controlled. The patient denies CP, SOB, N/V/D, dizziness, abdominal pain, HA. OBJECTIVE:     Vital Signs:    Temp (24hrs), Av.9 °F (36.6 °C), Min:96.9 °F (36.1 °C), Max:98.3 °F (36.8 °C)      Patient Vitals for the past 24 hrs:   BP Temp Pulse Resp SpO2 Height Weight   19 1008 131/78 97.9 °F (36.6 °C) 72 16 95 % -- --   19 0324 138/81 98 °F (36.7 °C) 73 14 95 % -- --   19 0009 132/83 98 °F (36.7 °C) 78 16 96 % -- --   19 2246 138/76 98 °F (36.7 °C) 78 14 98 % -- --   19 2150 119/86 98.2 °F (36.8 °C) 77 16 95 % -- --   19 2045 133/83 98.3 °F (36.8 °C) 73 14 95 % -- --   19 -- 98.1 °F (36.7 °C) -- -- -- -- --   19 -- -- 72 14 97 % -- --   19 126/76 -- 70 18 97 % -- --   19 -- -- 71 13 95 % -- --   19 -- -- 72 13 95 % -- --   19 122/68 -- 70 12 94 % -- --   19 -- -- 70 13 95 % -- --   19 -- -- 70 18 98 % -- --   19 126/72 -- 70 12 97 % -- --   19 122/68 -- 71 17 98 % -- --   19 122/74 -- 70 14 99 % -- --   19 -- 96.9 °F (36.1 °C) -- -- -- -- --   19 126/73 -- 72 -- 92 % -- --   19 1344 (!) 150/91 97.7 °F (36.5 °C) 69 18 95 % -- --   19 1327 -- -- -- -- -- 5' 8\" (1.727 m) 135.6 kg (299 lb)           Physical Exam:  General: A&Ox3, NAD. Respiratory: Respirations are unlabored.   Abdomen: S/NT  Surgical site: C,D and I.  Musculoskeletal: Calves are S/NT, Monty dorsi/plantar flexion/EHL/quads/hip flexion intact, Monty DP 2+  Neurological:  Monty LE's NVI    Laboratory Values:             Recent Labs     09/19/19  0331   HGB 12.5   CREA 0.58   *     Lab Results   Component Value Date/Time    Sodium 139 09/19/2019 03:31 AM    Potassium 4.0 09/19/2019 03:31 AM    Chloride 105 09/19/2019 03:31 AM    CO2 29 09/19/2019 03:31 AM    Glucose 114 (H) 09/19/2019 03:31 AM    BUN 12 09/19/2019 03:31 AM    Creatinine 0.58 09/19/2019 03:31 AM    Calcium 8.5 09/19/2019 03:31 AM       Hemovac Output:   175 ml in the last 12 hours. PLAN:     S/P LUMBAR LAMINECTOMY WITH POSTERIOR SPINAL FUSION L4-5 Mobilize with PT/nursing until discharged. TLSO brace ordered. OK to mobilize prior to TLSO brace being fitted. Spine precautions. Hemodynamics Hgb 12.5, will continue to monitor. Wound Dressing changes PRN. Continue Hemovac. Post Operative Pain D/C PCA, Oxycodone for pain control. DVT Prophylaxis Continue with SCD'S, Encourage Ankle Pump Exercises, Mobilize. Discharge Disposition Discharge plan: Will focus on pain control and mobilizing with PT/nursing. Case Management for discharge planning. Will continue to monitor progress closely. The patient was seen and evaluated by Dr Ozzie Sexton who agrees with the findings and treatment plan as outlined above.         Signed By: Meron Vazquez PA-C  September 19, 2019 10:27 AM

## 2019-09-20 LAB — HGB BLD-MCNC: 12.2 G/DL (ref 11.5–16)

## 2019-09-20 PROCEDURE — 36415 COLL VENOUS BLD VENIPUNCTURE: CPT

## 2019-09-20 PROCEDURE — 97530 THERAPEUTIC ACTIVITIES: CPT

## 2019-09-20 PROCEDURE — 85018 HEMOGLOBIN: CPT

## 2019-09-20 PROCEDURE — 74011250637 HC RX REV CODE- 250/637: Performed by: NURSE PRACTITIONER

## 2019-09-20 PROCEDURE — 74011250637 HC RX REV CODE- 250/637: Performed by: PHYSICIAN ASSISTANT

## 2019-09-20 PROCEDURE — 97535 SELF CARE MNGMENT TRAINING: CPT

## 2019-09-20 PROCEDURE — 65270000029 HC RM PRIVATE

## 2019-09-20 PROCEDURE — 97116 GAIT TRAINING THERAPY: CPT | Performed by: PHYSICAL THERAPIST

## 2019-09-20 PROCEDURE — 97530 THERAPEUTIC ACTIVITIES: CPT | Performed by: PHYSICAL THERAPIST

## 2019-09-20 RX ORDER — DIAZEPAM 5 MG/1
5 TABLET ORAL
Qty: 30 TAB | Refills: 0 | Status: SHIPPED | OUTPATIENT
Start: 2019-09-20 | End: 2020-03-13

## 2019-09-20 RX ORDER — POLYETHYLENE GLYCOL 3350 17 G/17G
17 POWDER, FOR SOLUTION ORAL
Qty: 14 PACKET | Refills: 0 | Status: SHIPPED | OUTPATIENT
Start: 2019-09-20 | End: 2020-03-13

## 2019-09-20 RX ORDER — AMOXICILLIN 250 MG
1 CAPSULE ORAL 2 TIMES DAILY
Qty: 60 TAB | Refills: 0 | Status: SHIPPED | OUTPATIENT
Start: 2019-09-20 | End: 2020-03-13

## 2019-09-20 RX ORDER — OXYCODONE HYDROCHLORIDE 5 MG/1
5-10 TABLET ORAL
Qty: 60 TAB | Refills: 0 | Status: SHIPPED | OUTPATIENT
Start: 2019-09-20 | End: 2019-09-25

## 2019-09-20 RX ADMIN — Medication 10 ML: at 22:34

## 2019-09-20 RX ADMIN — OXYCODONE HYDROCHLORIDE 10 MG: 5 TABLET ORAL at 17:47

## 2019-09-20 RX ADMIN — OXYCODONE HYDROCHLORIDE 10 MG: 5 TABLET ORAL at 13:10

## 2019-09-20 RX ADMIN — ACETAMINOPHEN 1000 MG: 500 TABLET ORAL at 13:10

## 2019-09-20 RX ADMIN — SENNOSIDES, DOCUSATE SODIUM 1 TABLET: 50; 8.6 TABLET, FILM COATED ORAL at 08:28

## 2019-09-20 RX ADMIN — PHENYTOIN SODIUM 330 MG: 100 CAPSULE ORAL at 07:23

## 2019-09-20 RX ADMIN — OXYCODONE HYDROCHLORIDE 10 MG: 5 TABLET ORAL at 04:41

## 2019-09-20 RX ADMIN — OXYCODONE HYDROCHLORIDE 5 MG: 5 TABLET ORAL at 22:30

## 2019-09-20 RX ADMIN — SENNOSIDES, DOCUSATE SODIUM 1 TABLET: 50; 8.6 TABLET, FILM COATED ORAL at 17:47

## 2019-09-20 RX ADMIN — Medication 10 ML: at 07:25

## 2019-09-20 RX ADMIN — POLYETHYLENE GLYCOL 3350 17 G: 17 POWDER, FOR SOLUTION ORAL at 08:28

## 2019-09-20 RX ADMIN — OXYCODONE HYDROCHLORIDE 10 MG: 5 TABLET ORAL at 08:28

## 2019-09-20 RX ADMIN — ACETAMINOPHEN 1000 MG: 500 TABLET ORAL at 07:25

## 2019-09-20 RX ADMIN — ACETAMINOPHEN 1000 MG: 500 TABLET ORAL at 17:47

## 2019-09-20 RX ADMIN — Medication 10 ML: at 07:24

## 2019-09-20 NOTE — PROGRESS NOTES
BRO: [id number] accepted patient for pending DC Sunday. If DC date changes Catrachita Aviles would like to be notified at [phone].

## 2019-09-20 NOTE — PROGRESS NOTES
Problem: Risk for Spread of Infection  Goal: Prevent transmission of infectious organism to others  Description  Prevent the transmission of infectious organisms to other patients, staff members, and visitors. Outcome: Progressing Towards Goal     Problem: Falls - Risk of  Goal: *Absence of Falls  Description  Document Leannetresa Mckeone Fall Risk and appropriate interventions in the flowsheet.   Outcome: Progressing Towards Goal  Note:   Fall Risk Interventions:  Mobility Interventions: Communicate number of staff needed for ambulation/transfer, OT consult for ADLs, Patient to call before getting OOB, PT Consult for mobility concerns, PT Consult for assist device competence, Utilize walker, cane, or other assistive device         Medication Interventions: Patient to call before getting OOB, Teach patient to arise slowly    Elimination Interventions: Call light in reach, Patient to call for help with toileting needs, Toileting schedule/hourly rounds              Problem: Simple Spine Procedure:  Day of Surgery  Goal: Off Pathway (Use only if patient is Off Pathway)  Outcome: Progressing Towards Goal  Goal: Activity/Safety  Outcome: Progressing Towards Goal  Goal: Respiratory  Outcome: Progressing Towards Goal     Problem: Simple Spine Procedure:  Post Op Day 1/Day of Discharge  Goal: Activity/Safety  Outcome: Progressing Towards Goal

## 2019-09-20 NOTE — PROGRESS NOTES
Orthopedics Spine Incoming Shift Nursing Note        INCOMING SHIFT REPORT:    Verbal and/or Written report received from Tray Samuel RN by John Blevins RN on Rossana Stacy a 62 y.o. female who was admitted on 9/18/2019 11:44 AM and currently admitted to unit. Code Status: Full Code     Admit Diagnosis: Spinal stenosis, lumbar [M48.061]  Lumbar stenosis [M48.061]     Surgery: Procedure(s):  LUMBAR LAMINECTOMY WITH POSTERIOR SPINAL FUSION L4-5     Infections: MRSA     Allergies: Patient has no known allergies. Current diet: DIET CLEAR LIQUID     Lines:   Peripheral IV 09/18/19 Left Wrist (Active)   Site Assessment Clean, dry, & intact 9/19/2019 10:08 AM   Phlebitis Assessment 0 9/19/2019 10:08 AM   Infiltration Assessment 0 9/19/2019 10:08 AM   Dressing Status Clean, dry, & intact 9/19/2019 10:08 AM   Dressing Type Transparent 9/19/2019 10:08 AM   Hub Color/Line Status Infusing 9/19/2019 10:08 AM   Action Taken Open ports on tubing capped 9/19/2019  4:12 AM   Alcohol Cap Used Yes 9/19/2019 10:08 AM          Report consisted of the following information SBAR, Kardex, Procedure Summary and MAR and the information was reviewed with the reporting nurse. Opportunity for questions and clarifications were provided. Patient's bed locked and in low position, side rails up x 2, door open PRN, call bell within patient's reach and patient is not in distress. A complete nursing assessment will be completed by the receiving nurse.       John Blevins RN, BSN, [facility]    9/19/2019 at 8:37 PM

## 2019-09-20 NOTE — PROGRESS NOTES
Orthopedic & Surgical Nursing Communication Tool       Bedside and Verbal shift change report given to Matt Melvin RN (incoming nurse) by Bridgette Vasquez RN (outgoing nurse) on Vimal Reyes a 62 y.o. female and born 1965 who arrived at the hospital on 9/18/2019 11:44 AM. Report included the following information SBAR, Kardex and MAR. Significant changes during shift: none       Issues for physician to address: none          Pain Controlled          [x] yes          [] no    Bowel Movement          [] yes          [x] no          Code Status: Full Code     Infections: MRSA     Admit Diagnosis: Spinal stenosis, lumbar [M48.061]  Lumbar stenosis [M48.061]     Surgery: Procedure(s):  LUMBAR LAMINECTOMY WITH POSTERIOR SPINAL FUSION L4-5     Allergies: Patient has no known allergies. Diet: DIET CLEAR LIQUID         Admission Date 9/18/2019   Consults None            Consults   []PT   []OT   []Speech   []Case Management      [] Rehab      Cardiac Monitoring Order   []Yes   [x]No       IV drips   []Yes    Drip:                            Dose:  Drip:                            Dose:  Drip:                            Dose:   [x]No     GI Prophylaxis   []Yes   []No         DVT Prophylaxis   SCDs:  Sequential/Intermittent Compression Device: Bilateral              Hubert stockings:  Graduated Compression Stockings: Bilateral         [] Meds   []Contraindicated   []None        Activity Level Activity Level: Up with Assistance      Activity Assistance: Partial (one person)     Purposeful Rounding every 1-2 hour? [x]Yes   Mary Score  Total Score: 3     Bed Alarm (If score 3 or >)   []Yes   [] Refused (See signed refusal form in chart)   Aditya Score  Aditya Score: 20       Aditya Score (if score 14 or less)   []PMT consult   []Wound Care consult      []Specialty bed   [] Nutrition consult        Needs prior to discharge:   Home O2 required:    []Yes   []No    If yes, how much O2 required? Other:    Last Bowel Movement Date: 09/18/19        Influenza Vaccine          Pneumonia Vaccine           DIET Active Orders   Diet    DIET CLEAR LIQUID      LDA Peripheral IV 09/18/19 Left Wrist (Active)   Site Assessment Clean, dry, & intact 9/19/2019  8:00 PM   Phlebitis Assessment 0 9/19/2019  8:00 PM   Infiltration Assessment 0 9/19/2019  8:00 PM   Dressing Status Clean, dry, & intact 9/19/2019  8:00 PM   Dressing Type Transparent 9/19/2019  8:00 PM   Hub Color/Line Status Capped 9/19/2019  8:00 PM   Action Taken Open ports on tubing capped 9/19/2019  4:12 AM   Alcohol Cap Used Yes 9/19/2019 10:08 AM      Urinary Catheter Urinary Catheter 09/18/19 2- way; [place]-Indications for Use: Surgery   Intake & Output Date 09/19/19 0700 - 09/20/19 0659 09/20/19 0700 - 09/21/19 0659   Shift 0701-1502 0131-6521 24 Hour Total 8957-4603 2992-8250 24 Hour Total   INTAKE   Shift Total(mL/kg)         OUTPUT   Urine(mL/kg/hr) 1450(0.9) 400(0.2) 1850(0.6) 1000  1000     Urine Output (mL) (Urinary Catheter 09/18/19 2- way; Adams) 7013 544 2452 1000  1000   Drains 180  180        Output (ml) (Hemovac Back) 180  180      Blood    80  80     Estimated Blood Loss    80  80   Shift Total(mL/kg) 1630(12) 400(2.9) 8489(81) 1080(8)  1080(8)   NET -1630 -400 -2030 -1080  -1080   Weight (kg) 135.6 135.6 135.6 135.6 135.6 135.6           Readmission Risk Assessment Tool Score Low Risk            5       Total Score        3 Has Seen PCP in Last 6 Months (Yes=3, No=0)    2 . Living with Significant Other. Assisted Living. LTAC. SNF. or   Rehab        Criteria that do not apply:    Patient Length of Stay (>5 days = 3)    IP Visits Last 12 Months (1-3=4, 4=9, >4=11)    Pt.  Coverage (Medicare=5 , Medicaid, or Self-Pay=4)    Charlson Comorbidity Score (Age + Comorbid Conditions)                 Expected Length of Stay 2d 21h   Actual Length of Stay 2          Vital Signs:     Patient Vitals for the past 12 hrs:   Temp Pulse Resp BP SpO2 09/20/19 0725 98.4 °F (36.9 °C) 78 18 124/74 94 %   09/20/19 0241 99.1 °F (37.3 °C) 77 18 126/76 94 %      Intake & Output:       Intake/Output Summary (Last 24 hours) at 9/20/2019 0827  Last data filed at 9/20/2019 0730  Gross per 24 hour   Intake --   Output 3110 ml   Net -3110 ml      Laboratory Results:     Recent Results (from the past 12 hour(s))   GLUCOSE, POC    Collection Time: 09/19/19 10:35 PM   Result Value Ref Range    Glucose (POC) 117 (H) 65 - 100 mg/dL    Performed by [address] [place] [address]    Collection Time: 09/20/19  4:49 AM   Result Value Ref Range    HGB 12.2 11.5 - 16.0 g/dL            Opportunity for questions and clarifications were given to the incoming nurse. Patient's bed locked and is in low position, side rails up x2, door open PRN, call bell within reach of patient and patient not in distress.       Signed by: Mary Solomon, RN, BSN, [address] [place] [address] Drive                        9/20/2019 at 8:27 AM

## 2019-09-20 NOTE — PROGRESS NOTES
Problem: Self Care Deficits Care Plan (Adult)  Goal: *Acute Goals and Plan of Care (Insert Text)  Description  FUNCTIONAL STATUS PRIOR TO ADMISSION: Patient was independent did not use any DME. Past medical history significant for surgeries but has not needed assistance prior to admission. HOME SUPPORT: The patient lived with , but didn't need help prior. Occupational Therapy Goals  Initiated 9/19/2019    1. Patient will perform lower body dressing with supervision/set-up using AE PRN within 4 days.- Goal not met, verbalized use of AE  2. Patient will perform toileting with supervision/set-up using most appropriate DME within 4 days.  -Goal Met  3. Patient will grooming at modified independence within 4 days.- Goal Met  4. Patient will don/doff back brace at supervision/set-up within 4 days.-Not met, safe to DC  5. Patient will verbalize/demonstrate 3/3 back precautions during ADL tasks without cues within 4 days. -GOAL MET   Outcome: Resolved/Met    OCCUPATIONAL THERAPY TREATMENT/DISCHARGE  Patient: Flako Nunn (58 y.o. female)  Date: 9/20/2019  Diagnosis: Spinal stenosis, lumbar [M48.061]  Lumbar stenosis [M48.061] <principal problem not specified>  Procedure(s) (LRB):  LUMBAR LAMINECTOMY WITH POSTERIOR SPINAL FUSION L4-5 (N/A) 2 Days Post-Op  Precautions: Back, Fall  Chart, occupational therapy assessment, plan of care, and goals were reviewed. ASSESSMENT  Based on the objective data described below, patient is Aaox4 and able to recall 3/3 precautions. Assistance in donning TLSO, pt is able to verbalize safey with brace by end of session. She continues to be slightly anxious and slow moving but is progressing well with therapy requiring no more then CGA to min A for mobilty today. The patient is able to verbalize understanding of AE for LB dressing and was provided with toilet aide education, handout provided.  The patient is safe to dc with family support, has all needed DME and AE from prior surgeries. .    Current Level of Function (ADLs/self-care): min A to mod A for TLSO, pt has family to assist at present level    Other factors to consider for discharge: none         PLAN :  Rationale for discharge: Goals achieved  Recommend with staff: Continued OOB as able and ambulation to and from bathroom as able for voiding  Recommendation for discharge: (in order for the patient to meet his/her long term goals)  No skilled occupational therapy/ follow up rehabilitation needs identified at this time. This discharge recommendation:  Has been made in collaboration with the attending provider and/or case management    Equipment recommendations for successful discharge: none       SUBJECTIVE:   Patient stated I am feeling better about this today.     OBJECTIVE DATA SUMMARY:   Cognitive/Behavioral Status:  Neurologic State: Alert  Orientation Level: Oriented X4  Cognition: Appropriate decision making             Functional Mobility and Transfers for ADLs:  Bed Mobility:  Rolling: Supervision  Supine to Sit: Contact guard assistance  Sit to Supine: Contact guard assistance  Scooting: Supervision    Transfers:  Sit to Stand: Contact guard assistance  Functional Transfers  Bathroom Mobility: Supervision/set up  Toilet Transfer : Contact guard assistance  Bed to Chair: Contact guard assistance    Balance:  Sitting: Intact  Standing: Intact    ADL Intervention:     Progressed to EOB for TLSO education for don/doff and fitting. The patient verbalized back, handout provided from company, reviewed with patient. Progressed to the bathroom for toileting, able to complete anterior cleaning with supervision/set/up then the patient discussed concerns with bowel movement hygiene, provided education, handout and tips for toilet aide. Patient verbalized understanding of AE for LB dressing equipment from prior surgeries.      Therapeutic Exercises:   Functional mobility: Bed: min A x1     Sit<>stand: extra time, CGA with RW     Toilet Transfer: min A x1 from low toilet    Pain:  Tolerated during therapy, did not c/o of pain at rest at end of session    Activity Tolerance:   Good  Please refer to the flowsheet for vital signs taken during this treatment.     After treatment patient left in no apparent distress:   Sitting in chair and Call bell within reach    COMMUNICATION/COLLABORATION:   The patients plan of care was discussed with: Physical Therapist and Registered Nurse    Sowmya Vora  Time Calculation: 45 mins

## 2019-09-20 NOTE — PROGRESS NOTES
Problem: Mobility Impaired (Adult and Pediatric)  Goal: *Acute Goals and Plan of Care (Insert Text)  Description  FUNCTIONAL STATUS PRIOR TO ADMISSION: Patient was independent and active without use of DME.    HOME SUPPORT PRIOR TO ADMISSION: The patient lived with  but did not require assist.    Physical Therapy Goals  Initiated 9/19/2019    1. Patient will move from supine to sit and sit to supine  in bed with independence within 4 days. 2. Patient will perform sit to stand with modified independence within 4 days. 3. Patient will ambulate with modified independence for 150 feet with the least restrictive device within 4 days. 4. Patient will ascend/descend 5 stairs with 1 handrail(s) with modified independence within 4 days. 5. Patient will verbalize and demonstrate understanding of spinal precautions (No bending, lifting greater than 5 lbs, or twisting; log-roll technique; frequent repositioning as instructed) within 4 days. Outcome: Progressing Towards Goal   PHYSICAL THERAPY TREATMENT  Patient: Flako Nunn (58 y.o. female)  Date: 9/20/2019  Diagnosis: Spinal stenosis, lumbar [M48.061]  Lumbar stenosis [M48.061] <principal problem not specified>  Procedure(s) (LRB):  LUMBAR LAMINECTOMY WITH POSTERIOR SPINAL FUSION L4-5 (N/A) 2 Days Post-Op  Precautions: Back, Fall No bending, no lifting greater than 5 lbs, no twisting, log-roll technique, repositioning every 20-30 min except when sleeping, brace when OOB (if ordered)  Chart, physical therapy assessment, plan of care and goals were reviewed. ASSESSMENT  Patient continues to make steady progress toward goals. Continues to be limited by pain and anxiety and needs extra time to complete all tasks. Does well with reassurance and allotting extra time for task. Still awaiting brace, which should be delivered today. Anticipate HH PT will be appropriate as she has good family support.     Current Level of Function Impacting Discharge (mobility/balance): modA x 1 with bed mobility, CGA transfers with extra time, amb approx 75 feet with RW and CGA    Other factors to consider for discharge: pain limits progress at times         PLAN :  Patient continues to benefit from skilled intervention to address the above impairments. Continue treatment per established plan of care. to address goals. Recommendation for discharge: (in order for the patient to meet his/her long term goals)  Physical therapy at least 2 days/week in the home     Equipment recommendations for successful discharge (if) home: patient owns DME required for discharge       SUBJECTIVE:   Patient stated [employer] is better than yesterday.     OBJECTIVE DATA SUMMARY:   Critical Behavior:  Neurologic State: Alert  Orientation Level: Oriented X4  Cognition: Appropriate decision making  Safety/Judgement: Awareness of environment    Spinal diagnosis intervention:  The patient stated 3/3 back precautions when prompted. Reviewed all 3 back precautions, log roll technique, and sitting for 30 minutes at a time. Functional Mobility Training:    Bed Mobility:  Log Rolling: Minimum assistance;Assist x1  Supine to Sit: Moderate assistance; Additional time;Assist x1  Sit to Supine: Moderate assistance;Assist x1  Scooting: Supervision; Additional time        Transfers:  Sit to Stand: Contact guard assistance; Additional time  Stand to Sit: Contact guard assistance; Additional time                             Balance:  Sitting: Intact; With support  Standing: Intact; With support  Ambulation/Gait Training:  Distance (ft): 70 Feet (ft)  Assistive Device: Gait belt;Walker, rolling  Ambulation - Level of Assistance: Contact guard assistance;Assist x1;Additional time        Gait Abnormalities: Antalgic;Decreased step clearance;Shuffling gait        Base of Support: Widened     Speed/Mary Kay: Pace decreased (<100 feet/min); Shuffled; Slow  Step Length: Left shortened;Right shortened         Pain Rating:  Reports pain but does not give #    Activity Tolerance:   Good  Please refer to the flowsheet for vital signs taken during this treatment.     After treatment patient left in no apparent distress:   Supine in bed, Heels elevated for pressure relief, Call bell within reach and Side rails x 3    COMMUNICATION/COLLABORATION:   The patients plan of care was discussed with: Physical Therapist, Occupational Therapist and Registered Nurse    Jorge Obrien, PT, DPT   Time Calculation: 36 mins

## 2019-09-20 NOTE — PROGRESS NOTES
Problem: Falls - Risk of  Goal: *Absence of Falls  Description  Document [facility] Fall Risk and appropriate interventions in the flowsheet.   Outcome: Progressing Towards Goal  Note:   Fall Risk Interventions:  Mobility Interventions: OT consult for ADLs, Communicate number of staff needed for ambulation/transfer, Patient to call before getting OOB, PT Consult for mobility concerns, PT Consult for assist device competence, Utilize walker, cane, or other assistive device         Medication Interventions: Patient to call before getting OOB, Teach patient to arise slowly    Elimination Interventions: Call light in reach, Patient to call for help with toileting needs, Toileting schedule/hourly rounds              Problem: Patient Education: Go to Patient Education Activity  Goal: Patient/Family Education  Outcome: Progressing Towards Goal     Problem: Complex Spine Procedure:  Post Op Day 2  Goal: Off Pathway (Use only if patient is Off Pathway)  Outcome: Progressing Towards Goal     Problem: Complex Spine Procedure:  Post Op Day 2  Goal: Activity/Safety  Outcome: Progressing Towards Goal     Problem: Complex Spine Procedure:  Post Op Day 2  Goal: Diagnostic Test/Procedures  Outcome: Progressing Towards Goal     Problem: Complex Spine Procedure:  Post Op Day 2  Goal: Nutrition/Diet  Outcome: Progressing Towards Goal     Problem: Complex Spine Procedure:  Post Op Day 2  Goal: Discharge Planning  Outcome: Progressing Towards Goal     Problem: Complex Spine Procedure:  Post Op Day 2  Goal: Medications  Outcome: Progressing Towards Goal     Problem: Complex Spine Procedure:  Post Op Day 2  Goal: Respiratory  Outcome: Progressing Towards Goal     Problem: Complex Spine Procedure:  Post Op Day 2  Goal: Treatments/Interventions/Procedures  Outcome: Progressing Towards Goal     Problem: Complex Spine Procedure:  Post Op Day 2  Goal: *Progress independence mobility/activities (eg: Mobility precautions)  Outcome: Progressing Towards Goal     Problem: Complex Spine Procedure:  Post Op Day 2  Goal: *Verbalizes/demonstrates understanding of proper body mechanics and use of stabilization device if ordered  Outcome: Progressing Towards Goal     Problem: Complex Spine Procedure:  Post Op Day 2  Goal: *Optimal pain control at patient's stated goal  Outcome: Progressing Towards Goal     Problem: Complex Spine Procedure:  Post Op Day 2  Goal: *Resumes normal function of bladder and bowel  Outcome: Progressing Towards Goal     Problem: Complex Spine Procedure:  Post Op Day 2  Goal: *Hemodynamically stable  Outcome: Progressing Towards Goal     Problem: Complex Spine Procedure:  Post Op Day 2  Goal: *Labs within defined limits  Outcome: Progressing Towards Goal     Problem: Complex Spine Procedure:  Post Op Day 2  Goal: *Able to place stabilization device  Outcome: Progressing Towards Goal

## 2019-09-20 NOTE — PROGRESS NOTES
Problem: Mobility Impaired (Adult and Pediatric)  Goal: *Acute Goals and Plan of Care (Insert Text)  Description  FUNCTIONAL STATUS PRIOR TO ADMISSION: Patient was independent and active without use of DME.    HOME SUPPORT PRIOR TO ADMISSION: The patient lived with  but did not require assist.    Physical Therapy Goals  Initiated 9/19/2019    1. Patient will move from supine to sit and sit to supine  in bed with independence within 4 days. 2. Patient will perform sit to stand with modified independence within 4 days. 3. Patient will ambulate with modified independence for 150 feet with the least restrictive device within 4 days. 4. Patient will ascend/descend 5 stairs with 1 handrail(s) with modified independence within 4 days. 5. Patient will verbalize and demonstrate understanding of spinal precautions (No bending, lifting greater than 5 lbs, or twisting; log-roll technique; frequent repositioning as instructed) within 4 days. 9/20/2019 1457 by Karan Louise, PT, DPT  Outcome: Progressing Towards Goal  9/20/2019 1112 by Karan Louise, PT, DPT  Outcome: Progressing Towards Goal   PHYSICAL THERAPY TREATMENT  Patient: Rossana Stacy (58 y.o. female)  Date: 9/20/2019  Diagnosis: Spinal stenosis, lumbar [M48.061]  Lumbar stenosis [M48.061] <principal problem not specified>  Procedure(s) (LRB):  LUMBAR LAMINECTOMY WITH POSTERIOR SPINAL FUSION L4-5 (N/A) 2 Days Post-Op  Precautions: Back, Fall No bending, no lifting greater than 5 lbs, no twisting, log-roll technique, repositioning every 20-30 min except when sleeping, brace when OOB (if ordered)  Chart, physical therapy assessment, plan of care and goals were reviewed. ASSESSMENT  Patient making steady progress toward goals. Improved mobility each session and brace has been arrived. Needing CGA for all mobility this session. Needs assist to don brace and cues for back precautions. Patient will need to address stairs tmrw prior to DC.   Will continue to follow for mobility progression. Current Level of Function Impacting Discharge (mobility/balance): CGA for mobility    Other factors to consider for discharge: none         PLAN :  Patient continues to benefit from skilled intervention to address the above impairments. Continue treatment per established plan of care. to address goals. Recommendation for discharge: (in order for the patient to meet his/her long term goals)  Physical therapy at least 2 days/week in the home         Equipment recommendations for successful discharge (if) home: patient owns DME required for discharge       SUBJECTIVE:   Patient stated I'm doing better each time.     OBJECTIVE DATA SUMMARY:   Critical Behavior:  Neurologic State: Alert  Orientation Level: Oriented X4  Cognition: Appropriate decision making  Safety/Judgement: Awareness of environment    Spinal diagnosis intervention:  The patient stated 3/3 back precautions when prompted. Reviewed all 3 back precautions, log roll technique, and sitting for 30 minutes at a time. Functional Mobility Training:    Bed Mobility:  Log Rolling: Minimum assistance;Assist x1  Supine to Sit: Minimum assistance;Assist x1  Sit to Supine: Moderate assistance;Assist x1  Scooting: Supervision        Transfers:  Sit to Stand: Contact guard assistance; Additional time  Stand to Sit: Contact guard assistance                             Balance:  Sitting: Intact; With support  Standing: Intact; With support  Ambulation/Gait Training:  Distance (ft): 120 Feet (ft)  Assistive Device: Gait belt;Walker, rolling  Ambulation - Level of Assistance: Contact guard assistance;Assist x1        Gait Abnormalities: Antalgic;Decreased step clearance        Base of Support: Widened     Speed/Mary Kay: Pace decreased (<100 feet/min); Slow  Step Length: Left shortened;Right shortened          Pain Rating:  No c/o pain    Activity Tolerance:   Fair and requires rest breaks  Please refer to the flowsheet for vital signs taken during this treatment.     After treatment patient left in no apparent distress:   Sitting in chair and Call bell within reach    COMMUNICATION/COLLABORATION:   The patients plan of care was discussed with: Physical Therapist, Occupational Therapist and Registered Nurse    Jacques Cano PT, DPT   Time Calculation: 49 mins

## 2019-09-20 NOTE — PROGRESS NOTES
Ortho Spine Daily Progress Note    Date of Surgery:  2019      Patient: Daryl Pichardo   YOB: 1965  Age: 62 y.o. SUBJECTIVE:   2 Days Post-Op following LUMBAR LAMINECTOMY WITH POSTERIOR SPINAL FUSION L4-5    The patient states significant back pain this morning. The patient states that their pre-operative lower extremity symptoms appear to be improved. The patient rates their current level of pain as 7/10. The patient's post operative pain is adequately controlled. Patient slowly mobilizing with PT/nursing. The patient denies CP, SOB, N/V/D, dizziness, abdominal pain, HA. OBJECTIVE:     Vital Signs:    Temp (24hrs), Av.5 °F (36.9 °C), Min:97.9 °F (36.6 °C), Max:99.1 °F (37.3 °C)      Patient Vitals for the past 24 hrs:   BP Temp Pulse Resp SpO2   19 0725 124/74 98.4 °F (36.9 °C) 78 18 94 %   19 0241 126/76 99.1 °F (37.3 °C) 77 18 94 %   19 1516 129/77 98.6 °F (37 °C) 79 18 93 %   19 1008 131/78 97.9 °F (36.6 °C) 72 16 95 %           Physical Exam:  General: A&Ox3, NAD. Respiratory: Respirations are unlabored. Abdomen: S/NT  Surgical site: C,D and I.  Musculoskeletal: Calves are S/NT, Monty dorsi/plantar flexion/EHL/quads/hip flexion intact, Monty DP 2+  Neurological:  Monty LE's NVI    Laboratory Values:             Recent Labs     19  0449 19  0331   HGB 12.2 12.5   CREA  --  0.58   GLU  --  114*     Lab Results   Component Value Date/Time    Sodium 139 2019 03:31 AM    Potassium 4.0 2019 03:31 AM    Chloride 105 2019 03:31 AM    CO2 29 2019 03:31 AM    Glucose 114 (H) 2019 03:31 AM    BUN 12 2019 03:31 AM    Creatinine 0.58 2019 03:31 AM    Calcium 8.5 2019 03:31 AM       Hemovac Output:   150 ml in the last 12 hours. PLAN:     S/P LUMBAR LAMINECTOMY WITH POSTERIOR SPINAL FUSION L4-5 Mobilize with PT/nursing until discharged. Spine precautions. TLSo brace should arrive today. TLSO when OOB. Hemodynamics Stable. Wound Continue dressing changes PRN. D/C hemovac. Post Operative Pain Pain Control: Adequate, continue current regimen. DVT Prophylaxis Continue with SCD'S, Encourage Ankle Pump Exercises, Mobilize. Discharge Disposition Discharge plan: Will focus on pain control and mobilizing with PT/nursing. Case Management for discharge planning. Plan on home with HH/PT on Sunday if pain is adequately controlled and patient able to mobilize and is safe for discharge. RX and discharge instructions on the chart in anticipation of discharge on Sunday. Will continue to monitor progress closely.                Signed By: Leeanna Arizmendi PA-C  September 20, 2019 8:08 AM

## 2019-09-21 LAB
GLUCOSE BLD STRIP.AUTO-MCNC: 125 MG/DL (ref 65–100)
SERVICE CMNT-IMP: ABNORMAL

## 2019-09-21 PROCEDURE — 97530 THERAPEUTIC ACTIVITIES: CPT

## 2019-09-21 PROCEDURE — 82962 GLUCOSE BLOOD TEST: CPT

## 2019-09-21 PROCEDURE — 97116 GAIT TRAINING THERAPY: CPT

## 2019-09-21 PROCEDURE — 74011250637 HC RX REV CODE- 250/637: Performed by: NURSE PRACTITIONER

## 2019-09-21 PROCEDURE — 65270000029 HC RM PRIVATE

## 2019-09-21 PROCEDURE — 74011250637 HC RX REV CODE- 250/637: Performed by: PHYSICIAN ASSISTANT

## 2019-09-21 RX ADMIN — Medication 5 ML: at 00:40

## 2019-09-21 RX ADMIN — OXYCODONE HYDROCHLORIDE 5 MG: 5 TABLET ORAL at 01:35

## 2019-09-21 RX ADMIN — OXYCODONE HYDROCHLORIDE 10 MG: 5 TABLET ORAL at 09:10

## 2019-09-21 RX ADMIN — Medication 5 ML: at 23:46

## 2019-09-21 RX ADMIN — OXYCODONE HYDROCHLORIDE 5 MG: 5 TABLET ORAL at 04:50

## 2019-09-21 RX ADMIN — OXYCODONE HYDROCHLORIDE 10 MG: 5 TABLET ORAL at 12:11

## 2019-09-21 RX ADMIN — POLYETHYLENE GLYCOL 3350 17 G: 17 POWDER, FOR SOLUTION ORAL at 08:55

## 2019-09-21 RX ADMIN — PHENYTOIN SODIUM 330 MG: 100 CAPSULE ORAL at 08:56

## 2019-09-21 RX ADMIN — SENNOSIDES, DOCUSATE SODIUM 1 TABLET: 50; 8.6 TABLET, FILM COATED ORAL at 08:55

## 2019-09-21 RX ADMIN — ACETAMINOPHEN 1000 MG: 500 TABLET ORAL at 04:51

## 2019-09-21 RX ADMIN — OXYCODONE HYDROCHLORIDE 10 MG: 5 TABLET ORAL at 19:50

## 2019-09-21 RX ADMIN — ACETAMINOPHEN 1000 MG: 500 TABLET ORAL at 12:11

## 2019-09-21 RX ADMIN — Medication 5 ML: at 22:00

## 2019-09-21 RX ADMIN — ACETAMINOPHEN 1000 MG: 500 TABLET ORAL at 17:57

## 2019-09-21 RX ADMIN — OXYCODONE HYDROCHLORIDE 5 MG: 5 TABLET ORAL at 23:45

## 2019-09-21 RX ADMIN — Medication 5 ML: at 00:41

## 2019-09-21 RX ADMIN — SENNOSIDES, DOCUSATE SODIUM 1 TABLET: 50; 8.6 TABLET, FILM COATED ORAL at 17:57

## 2019-09-21 RX ADMIN — OXYCODONE HYDROCHLORIDE 10 MG: 5 TABLET ORAL at 16:32

## 2019-09-21 NOTE — PROGRESS NOTES
Problem: Risk for Spread of Infection  Goal: Prevent transmission of infectious organism to others  Description  Prevent the transmission of infectious organisms to other patients, staff members, and visitors. Outcome: Progressing Towards Goal     Problem: Patient Education:  Go to Education Activity  Goal: Patient/Family Education  Outcome: Progressing Towards Goal     Problem: Falls - Risk of  Goal: *Absence of Falls  Description  Document Raffaele Hunter Fall Risk and appropriate interventions in the flowsheet.   Outcome: Progressing Towards Goal  Note:   Fall Risk Interventions:  Mobility Interventions: Communicate number of staff needed for ambulation/transfer         Medication Interventions: Teach patient to arise slowly    Elimination Interventions: Call light in reach              Problem: Patient Education: Go to Patient Education Activity  Goal: Patient/Family Education  Outcome: Progressing Towards Goal     Problem: Simple Spine Procedure:  Day of Surgery  Goal: Off Pathway (Use only if patient is Off Pathway)  Outcome: Progressing Towards Goal  Goal: Activity/Safety  Outcome: Progressing Towards Goal  Goal: Consults, if ordered  Outcome: Progressing Towards Goal  Goal: Nutrition/Diet  Outcome: Progressing Towards Goal  Goal: Discharge Planning  Outcome: Progressing Towards Goal  Goal: Medications  Outcome: Progressing Towards Goal  Goal: Respiratory  Outcome: Progressing Towards Goal  Goal: Treatments/Interventions/Procedures  Outcome: Progressing Towards Goal  Goal: Psychosocial  Outcome: Progressing Towards Goal  Goal: *Verbalizes understanding of type and use of pain medication  Outcome: Progressing Towards Goal  Goal: *Optimal pain control at patient's stated goal  Outcome: Progressing Towards Goal  Goal: *Verbalizes/demonstrates understanding of proper body mechanics and use of stabilization device if ordered  Outcome: Progressing Towards Goal  Goal: *Activity level attained as ordered  Outcome: Progressing Towards Goal  Goal: *Active bowel sounds  Outcome: Progressing Towards Goal  Goal: *Respiratory status stable  Outcome: Progressing Towards Goal  Goal: *Adequate urinary output  Outcome: Progressing Towards Goal  Goal: *Hemodynamically stable  Outcome: Progressing Towards Goal     Problem: Simple Spine Procedure:  Post Op Day 1/Day of Discharge  Goal: Off Pathway (Use only if patient is Off Pathway)  Outcome: Progressing Towards Goal  Goal: Activity/Safety  Outcome: Progressing Towards Goal  Goal: Nutrition/Diet  Outcome: Progressing Towards Goal  Goal: Discharge Planning  Outcome: Progressing Towards Goal  Goal: Medications  Outcome: Progressing Towards Goal  Goal: Respiratory  Outcome: Progressing Towards Goal  Goal: Treatments/Interventions/Procedures  Outcome: Progressing Towards Goal  Goal: Psychosocial  Outcome: Progressing Towards Goal     Problem: Simple Spine Procedure: Discharge Outcomes  Goal: *Optimal pain control at patient's stated goal  Outcome: Progressing Towards Goal  Goal: *Demonstrates ability to place and remove stabilization device  Outcome: Progressing Towards Goal  Goal: *Progress independence mobility/activities (eg: Mobility precautions)  Outcome: Progressing Towards Goal  Goal: *Resumes normal function of bladder and bowel  Outcome: Progressing Towards Goal  Goal: *Lungs clear or at baseline  Outcome: Progressing Towards Goal  Goal: *Verbalizes name, dosage, time, side effects, and number of days to continue medications  Outcome: Progressing Towards Goal  Goal: *Modified independence with transfers, ambulation on levels with assistance devices, stair climbing, ADL's  Outcome: Progressing Towards Goal  Goal: *Describes follow-up/return visits to physicians  Outcome: Progressing Towards Goal  Goal: *Describes available resources and support systems  Outcome: Progressing Towards Goal  Goal: *Labs within defined limits  Outcome: Progressing Towards Goal  Goal: *Tolerating diet  Outcome: Progressing Towards Goal     Problem: Patient Education: Go to Patient Education Activity  Goal: Patient/Family Education  Outcome: Progressing Towards Goal     Problem: Complex Spine Procedure:  Day of Surgery  Goal: Off Pathway (Use only if patient is Off Pathway)  Outcome: Progressing Towards Goal  Goal: Activity/Safety  Outcome: Progressing Towards Goal  Goal: Consults, if ordered  Outcome: Progressing Towards Goal  Goal: Diagnostic Test/Procedures  Outcome: Progressing Towards Goal  Goal: Nutrition/Diet  Outcome: Progressing Towards Goal  Goal: Discharge Planning  Outcome: Progressing Towards Goal  Goal: Medications  Outcome: Progressing Towards Goal  Goal: Respiratory  Outcome: Progressing Towards Goal  Goal: Treatments/Interventions/Procedures  Outcome: Progressing Towards Goal  Goal: Psychosocial  Outcome: Progressing Towards Goal  Goal: *Optimal pain control at patient's stated goal  Outcome: Progressing Towards Goal  Goal: *Demonstrates progressive activity  Outcome: Progressing Towards Goal  Goal: *Respiratory status stable  Outcome: Progressing Towards Goal     Problem: Complex Spine Procedure:  Post Op Day 1  Goal: Off Pathway (Use only if patient is Off Pathway)  Outcome: Progressing Towards Goal  Goal: Activity/Safety  Outcome: Progressing Towards Goal  Goal: Consults, if ordered  Outcome: Progressing Towards Goal  Goal: Diagnostic Test/Procedures  Outcome: Progressing Towards Goal  Goal: Nutrition/Diet  Outcome: Progressing Towards Goal  Goal: Discharge Planning  Outcome: Progressing Towards Goal  Goal: Medications  Outcome: Progressing Towards Goal  Goal: Respiratory  Outcome: Progressing Towards Goal  Goal: Treatments/Interventions/Procedures  Outcome: Progressing Towards Goal  Goal: Psychosocial  Outcome: Progressing Towards Goal  Goal: *Progress independence mobility/activities (eg: Mobility precautions)  Outcome: Progressing Towards Goal  Goal: *Verbalizes/demonstrates understanding of proper body mechanics and use of stabilization device if ordered  Outcome: Progressing Towards Goal  Goal: *Optimal pain control at patient's stated goal  Outcome: Progressing Towards Goal  Goal: *Resumes normal function of bladder and bowel  Outcome: Progressing Towards Goal  Goal: *Hemodynamically stable  Outcome: Progressing Towards Goal  Goal: *Tolerating diet  Outcome: Progressing Towards Goal  Goal: *Labs within defined limits  Outcome: Progressing Towards Goal     Problem: Complex Spine Procedure:  Post Op Day 2  Goal: Off Pathway (Use only if patient is Off Pathway)  Outcome: Progressing Towards Goal  Goal: Activity/Safety  Outcome: Progressing Towards Goal  Goal: Diagnostic Test/Procedures  Outcome: Progressing Towards Goal  Goal: Nutrition/Diet  Outcome: Progressing Towards Goal  Goal: Discharge Planning  Outcome: Progressing Towards Goal  Goal: Medications  Outcome: Progressing Towards Goal  Goal: Respiratory  Outcome: Progressing Towards Goal  Goal: Treatments/Interventions/Procedures  Outcome: Progressing Towards Goal  Goal: Psychosocial  Outcome: Progressing Towards Goal  Goal: *Progress independence mobility/activities (eg: Mobility precautions)  Outcome: Progressing Towards Goal  Goal: *Verbalizes/demonstrates understanding of proper body mechanics and use of stabilization device if ordered  Outcome: Progressing Towards Goal  Goal: *Optimal pain control at patient's stated goal  Outcome: Progressing Towards Goal  Goal: *Resumes normal function of bladder and bowel  Outcome: Progressing Towards Goal  Goal: *Hemodynamically stable  Outcome: Progressing Towards Goal  Goal: *Tolerating diet  Outcome: Progressing Towards Goal  Goal: *Labs within defined limits  Outcome: Progressing Towards Goal  Goal: *Able to place stabilization device  Outcome: Progressing Towards Goal     Problem: Complex Spine Procedure:  Post Op Day 3  Goal: Off Pathway (Use only if patient is Off Pathway)  Outcome: Progressing Towards Goal  Goal: Activity/Safety  Outcome: Progressing Towards Goal  Goal: Nutrition/Diet  Outcome: Progressing Towards Goal  Goal: Discharge Planning  Outcome: Progressing Towards Goal  Goal: Medications  Outcome: Progressing Towards Goal  Goal: Respiratory  Outcome: Progressing Towards Goal  Goal: Treatments/Interventions/Procedures  Outcome: Progressing Towards Goal  Goal: Psychosocial  Outcome: Progressing Towards Goal     Problem: Complex Spine Procedure:  Discharge Outcomes  Goal: *Optimal pain control at patient's stated goal  Outcome: Progressing Towards Goal  Goal: *Demonstrates ability to place and remove stabilization device  Outcome: Progressing Towards Goal  Goal: *Progress independence mobility/activities (eg: Mobility precautions)  Outcome: Progressing Towards Goal  Goal: *Resumes normal function of bladder and bowel  Outcome: Progressing Towards Goal  Goal: *Lungs clear or at baseline  Outcome: Progressing Towards Goal  Goal: *Verbalizes name, dosage, time, side effects, and number of days to continue medications  Outcome: Progressing Towards Goal  Goal: *Modified independence with transfers, ambulation on levels with assistance devices, stair climbing, ADL's  Outcome: Progressing Towards Goal  Goal: *Describes follow-up/return visits to physicians  Outcome: Progressing Towards Goal  Goal: *Describes available resources and support systems  Outcome: Progressing Towards Goal  Goal: *Labs within defined limits  Outcome: Progressing Towards Goal  Goal: *Tolerating diet  Outcome: Progressing Towards Goal

## 2019-09-21 NOTE — PROGRESS NOTES
Problem: Mobility Impaired (Adult and Pediatric)  Goal: *Acute Goals and Plan of Care (Insert Text)  Description  FUNCTIONAL STATUS PRIOR TO ADMISSION: Patient was independent and active without use of DME.    HOME SUPPORT PRIOR TO ADMISSION: The patient lived with  but did not require assist.    Physical Therapy Goals  Initiated 9/19/2019    1. Patient will move from supine to sit and sit to supine  in bed with independence within 4 days. 2. Patient will perform sit to stand with modified independence within 4 days. 3. Patient will ambulate with modified independence for 150 feet with the least restrictive device within 4 days. 4. Patient will ascend/descend 5 stairs with 1 handrail(s) with modified independence within 4 days. 5. Patient will verbalize and demonstrate understanding of spinal precautions (No bending, lifting greater than 5 lbs, or twisting; log-roll technique; frequent repositioning as instructed) within 4 days. Outcome: Progressing Towards Goal   PHYSICAL THERAPY TREATMENT  Patient: Jennifer Mckinnon (58 y.o. female)  Date: 9/21/2019  Diagnosis: Spinal stenosis, lumbar [M48.061]  Lumbar stenosis [M48.061] <principal problem not specified>  Procedure(s) (LRB):  LUMBAR LAMINECTOMY WITH POSTERIOR SPINAL FUSION L4-5 (N/A) 3 Days Post-Op  Precautions: Back, Fall No bending, no lifting greater than 5 lbs, no twisting, log-roll technique, repositioning every 20-30 min except when sleeping, brace when OOB (if ordered)  Chart, physical therapy assessment, plan of care and goals were reviewed. ASSESSMENT  Based on the objective data described below, pt progressing well with all goals and agreeable this morning to bed mobility, transfers, toileting, grooming (seated), gait training, stair training, and return to chair post activity for 30' increments. Pt continues to require assist x1 and hesitates prior to transfers 2* difficulty and discomfort.   Pt initiated stair training this morning though will benefit from f review tomorrow with unilateral rail support per home needs (no problems anticipated). Pt likely to D/C home tomorrow with family support and HHPT. Pt has DME for home needs. Of note: pt with dark malodorous urine and RN notified    Current Level of Function Impacting Discharge (mobility/balance): upwards of [place]           PLAN :  Patient continues to benefit from skilled intervention to address the above impairments. Continue treatment per established plan of care. to address goals. Recommendation for discharge: (in order for the patient to meet his/her long term goals)  Physical therapy at least 2 days/week in the home     This discharge recommendation:  Has been made in collaboration with the attending provider and/or case management    Equipment recommendations for successful discharge (if) home: none       SUBJECTIVE:   Patient stated I just need some color. ..  Pt donned make-up in sitting post void. OBJECTIVE DATA SUMMARY:   Critical Behavior:  Neurologic State: Alert  Orientation Level: Oriented X4  Cognition: Appropriate decision making, Appropriate for age attention/concentration  Safety/Judgement: Awareness of environment    Spinal diagnosis intervention:  The patient stated 2/3 back precautions when prompted. Reviewed all 3 back precautions, log roll technique, and sitting for 30 minutes at a time. Functional Mobility Training:    Bed Mobility:  Log Rolling: Supervision; Additional time(cues not to twist for logroll)  Supine to Sit: Minimum assistance; Additional time(pt used therapist' waist as leverage for midline)  Sit to Supine: (NT; OOB to chair)  Scooting: Additional time;Supervision        Transfers:  Sit to Stand: Contact guard assistance; Additional time(cues for wide SOSA)  Stand to Sit: Contact guard assistance; Additional time(cues for alignment, reach back, wide SOSA, controlled descent)        Bed to Chair: (NT)                    Balance:  Sitting: Intact  Standing: Impaired; Without support  Standing - Static: Fair  Standing - Dynamic : Fair  Ambulation/Gait Training:  Distance (ft): 40 Feet (ft)(x2)  Assistive Device: Gait belt;Brace/Splint; Walker, rolling  Ambulation - Level of Assistance: Contact guard assistance; Additional time        Gait Abnormalities: Antalgic;Decreased step clearance        Base of Support: Widened     Speed/Mary Kay: Slow;Pace decreased (<100 feet/min)  Step Length: Right shortened;Left shortened      Stairs:  Number of Stairs Trained: 5  Stairs - Level of Assistance: Contact guard assistance; Additional time   Rail Use: Both      Pain Rating:  Pain well controlled for session per pt report    Activity Tolerance:   Good and Fair  Please refer to the flowsheet for vital signs taken during this treatment.     After treatment patient left in no apparent distress:   Sitting in chair and Call bell within reach    COMMUNICATION/COLLABORATION:   The patients plan of care was discussed with: Registered Nurse    Mónica Drummond   Time Calculation: 33 mins

## 2019-09-21 NOTE — PROGRESS NOTES
Ortho Spine Daily Progress Note    Date of Surgery:  2019      Patient: Roxanne Meredith   YOB: 1965  Age: 62 y.o. SUBJECTIVE:   3 Days Post-Op following LUMBAR LAMINECTOMY WITH POSTERIOR SPINAL FUSION L4-5    Patient seen and examined at bedside. Pain controlled. No new complaints this AM.    OBJECTIVE:     Vital Signs:    Temp (24hrs), Av.5 °F (36.9 °C), Min:98.1 °F (36.7 °C), Max:99.1 °F (37.3 °C)      Patient Vitals for the past 24 hrs:   BP Temp Pulse Resp SpO2   19 0850 119/84 98.5 °F (36.9 °C) 76 15 94 %   19 0436 134/78 99.1 °F (37.3 °C) 80 16 95 %   19 0124 122/80 98.1 °F (36.7 °C) 82 16 96 %   19 2036 117/73 98.4 °F (36.9 °C) 77 16 95 %           Physical Exam:  General: A&Ox3, NAD. Respiratory: Respirations are unlabored. Abdomen: S/NT  Surgical site: C,D and I.  Musculoskeletal: Calves are S/NT, Monty dorsi/plantar flexion/EHL/quads/hip flexion intact, Monty DP 2+  Neurological:  Monty LE's NVI    Laboratory Values:             Recent Labs     19  0449 19  0331   HGB 12.2 12.5   CREA  --  0.58   GLU  --  114*     Lab Results   Component Value Date/Time    Sodium 139 2019 03:31 AM    Potassium 4.0 2019 03:31 AM    Chloride 105 2019 03:31 AM    CO2 29 2019 03:31 AM    Glucose 114 (H) 2019 03:31 AM    BUN 12 2019 03:31 AM    Creatinine 0.58 2019 03:31 AM    Calcium 8.5 2019 03:31 AM       Hemovac Output:   30 ml in the last 12 hours. PLAN:     S/P LUMBAR LAMINECTOMY WITH POSTERIOR SPINAL FUSION L4-5 Mobilize with PT/nursing until discharged. Spine precautions. TLSo brace should arrive today. TLSO when OOB. Hemodynamics Stable. Wound Continue dressing changes PRN. D/C hemovac. Post Operative Pain Pain Control: Adequate, continue current regimen. DVT Prophylaxis Continue with SCD'S, Encourage Ankle Pump Exercises, Mobilize. Discharge Disposition Discharge plan:  Will focus on pain control and mobilizing with PT/nursing. Case Management for discharge planning. Plan on home with HH/PT on Sunday if pain is adequately controlled and patient able to mobilize and is safe for discharge. RX and discharge instructions on the chart in anticipation of discharge on Sunday. Will continue to monitor progress closely.                Signed By: Valentín Obando DO  September 21, 2019 8:08 AM

## 2019-09-22 VITALS
HEART RATE: 76 BPM | OXYGEN SATURATION: 93 % | RESPIRATION RATE: 15 BRPM | WEIGHT: 293 LBS | BODY MASS INDEX: 44.41 KG/M2 | DIASTOLIC BLOOD PRESSURE: 62 MMHG | TEMPERATURE: 97.5 F | HEIGHT: 68 IN | SYSTOLIC BLOOD PRESSURE: 119 MMHG

## 2019-09-22 PROCEDURE — 74011250637 HC RX REV CODE- 250/637: Performed by: NURSE PRACTITIONER

## 2019-09-22 PROCEDURE — 97530 THERAPEUTIC ACTIVITIES: CPT

## 2019-09-22 PROCEDURE — 74011250637 HC RX REV CODE- 250/637: Performed by: PHYSICIAN ASSISTANT

## 2019-09-22 RX ADMIN — ACETAMINOPHEN 1000 MG: 500 TABLET ORAL at 12:35

## 2019-09-22 RX ADMIN — OXYCODONE HYDROCHLORIDE 10 MG: 5 TABLET ORAL at 09:29

## 2019-09-22 RX ADMIN — SENNOSIDES, DOCUSATE SODIUM 1 TABLET: 50; 8.6 TABLET, FILM COATED ORAL at 09:19

## 2019-09-22 RX ADMIN — OXYCODONE HYDROCHLORIDE 10 MG: 5 TABLET ORAL at 12:35

## 2019-09-22 RX ADMIN — PHENYTOIN SODIUM 330 MG: 100 CAPSULE ORAL at 06:59

## 2019-09-22 RX ADMIN — POLYETHYLENE GLYCOL 3350 17 G: 17 POWDER, FOR SOLUTION ORAL at 09:19

## 2019-09-22 RX ADMIN — ACETAMINOPHEN 1000 MG: 500 TABLET ORAL at 06:59

## 2019-09-22 NOTE — PROGRESS NOTES
I have reviewed discharge instructions, medications, spine precautions, follow-up appointments, and wound care with the patient and spouse. The patient and spouse verbalized understanding. Patient left with  with all belongings.

## 2019-09-22 NOTE — PROGRESS NOTES
PHYSICAL THERAPY TREATMENT/DISCHARGE  Patient: Vimal Reyes (58 y.o. female)  Date: 9/22/2019  Diagnosis: Spinal stenosis, lumbar [M48.061]  Lumbar stenosis [M48.061] <principal problem not specified>  Procedure(s) (LRB):  LUMBAR LAMINECTOMY WITH POSTERIOR SPINAL FUSION L4-5 (N/A) 4 Days Post-Op  Precautions: Back, Fall  Chart, physical therapy assessment, plan of care and goals were reviewed. ASSESSMENT  Attempted to see pt this morning for functional mobility review. Upon arrival, pt states she has been up and completed grooming/toileting. Pt politely deferred further mobility at this time to include stair training review. Pt states \"Im pretty confident on the stairs. \"  She also deferred bed mobility, transfers, and gait. Pt did have numerous questions regarding home activity, vehicle transfers, and seated surfaces for home use. PT reviewed spinal precautions, 30' sitting increments (recommended break on drive home at 39' to get out and walk briefly), hourly walking, RW use (weaning will be done with HHPT), and precautions with using her sit-stand chair. Pt reports all questions answered and all needs met prior to PT leaving. Please contact with any additional needs should they arise prior to discharge. Pt IS CLEAR for home with HHPT and family support at this time. Other factors to consider for discharge: pt will have support at home as needed         PLAN :  Patient will be discharged from acute skilled physical therapy at this time. Rationale for discharge:   Other: Goals acheived to pt's satisfaction; deferring further participation    Recommendation for discharge: (in order for the patient to meet his/her long term goals)  Physical therapy at least 2 days/week in the home     This discharge recommendation:  Has been made in collaboration with the attending provider and/or case management    Equipment recommendations for successful discharge: pt has RW for home use       SUBJECTIVE: Patient stated I have been up to the bathroom and washed my face and brushed my teeth. ..    OBJECTIVE DATA SUMMARY:   Critical Behavior:  Neurologic State: Alert  Orientation Level: Oriented X4  Cognition: Appropriate for age attention/concentration, Appropriate decision making, Appropriate safety awareness  Safety/Judgement: Awareness of environment    After treatment patient left in no apparent distress:   Supine in bed and Call bell within reach    COMMUNICATION/COLLABORATION:   The patients plan of care was discussed with: Registered Nurse    Arelis Day   Time Calculation: 10 mins

## 2019-09-22 NOTE — PROGRESS NOTES
Ortho Spine Daily Progress Note    Date of Surgery:  2019      Patient: Tawanda Blair   YOB: 1965  Age: 62 y.o. SUBJECTIVE:   4 Days Post-Op following LUMBAR LAMINECTOMY WITH POSTERIOR SPINAL FUSION L4-5    Patient seen and examined at bedside. Pain controlled. No new complaints this AM.  States she feels ready to go home. OBJECTIVE:     Vital Signs:    Temp (24hrs), Av.7 °F (37.1 °C), Min:98.1 °F (36.7 °C), Max:99.1 °F (37.3 °C)      Patient Vitals for the past 24 hrs:   BP Temp Pulse Resp SpO2   19 0329 109/70 99.1 °F (37.3 °C) 83 16 94 %   19 0006 106/73 98.5 °F (36.9 °C) 75 16 92 %   19 202 122/76 99.1 °F (37.3 °C) 83 16 92 %   19 1421 123/74 98.1 °F (36.7 °C) 70 16 94 %           Physical Exam:  General: A&Ox3, NAD. Respiratory: Respirations are unlabored. Abdomen: S/NT  Surgical site: C,D and I.  Musculoskeletal: Calves are S/NT, Monty dorsi/plantar flexion/EHL/quads/hip flexion intact, Monty DP 2+  Neurological:  Monty LE's NVI    Laboratory Values:             Recent Labs     19  0449   HGB 12.2     Lab Results   Component Value Date/Time    Sodium 139 2019 03:31 AM    Potassium 4.0 2019 03:31 AM    Chloride 105 2019 03:31 AM    CO2 29 2019 03:31 AM    Glucose 114 (H) 2019 03:31 AM    BUN 12 2019 03:31 AM    Creatinine 0.58 2019 03:31 AM    Calcium 8.5 2019 03:31 AM       Hemovac Output:   30 ml in the last 12 hours. PLAN:     S/P LUMBAR LAMINECTOMY WITH POSTERIOR SPINAL FUSION L4-5 Mobilize with PT/nursing until discharged. Spine precautions. TLSO when OOB. Hemodynamics Stable. Wound Continue dressing changes PRN. Post Operative Pain Pain Control: Adequate, continue current regimen. DVT Prophylaxis Continue with SCD'S, Encourage Ankle Pump Exercises, Mobilize. Discharge Disposition Discharge plan:  Patient cleared by physical therapy for DC to home with HHPT.   Plan for DC to home with HHPT today.                Signed By: Vandana Norman DO  September 22, 2019 8:08 AM

## 2019-09-26 NOTE — DISCHARGE SUMMARY
[address]   [address] [place] [address] SUMMARY     Patient: Yarelis Crowder                             Medical Record Number: [id number]                : 1965  Age: 62 y.o. Admit Date: 2019  Discharge Date: 2019  Admission Diagnosis: Spinal stenosis, lumbar [M48.061]  Lumbar stenosis [M48.061]  Discharge Diagnosis: SEVERE SPINAL STENOSIS L4-5  Procedures: Procedure(s):  LUMBAR LAMINECTOMY WITH POSTERIOR SPINAL FUSION L4-5  Surgeon: MICK Liz MD  Assistants:  Samantha Chen. JUMA Woods  Anesthesia: general  Complications: None     History of Present Illness:  Valentine Sorensen is a 49-year-old morbidly obese patient, weighing over 280 pounds, seen with complaints of back, hip, and leg pain to the left side predominantly with some pain to the right. Given the severity of complaints and failure to improve with conservative measures, ultimately had an MRI scan completed, demonstrating severe canal narrowing with associated synovial cyst on the symptomatic left side at that level. She has large joint effusions and a degenerative spondylolisthesis at the same segment. Given the severity of complaint, those finding clinically and radiologically, a lumbar decompression and spinal fusion offered. Potential benefits and complications reviewed.     Hospital Course:  Valentine Sorensen tolerated the procedure well. She was transferred to the Spinal Unit from the recovery room in stable condition. On postoperative day 1, the dressing was noted to be clean and dry, she was neurovascularly intact and afebrile, and her vital signs were stable. Hemoglobin was noted to be   Lab Results   Component Value Date/Time    HGB 12.2 2019 04:49 AM     On postoperative day 1and 2,  Valentine Sorensen made excellent progress with physical therapy and was discharged to Home in stable condition on postoperative day 3.   She was given routine postoperative instructions and advised to follow up in the office in 2 weeks following discharge home. She was given the following prescriptions for pain medications. Discharge Medications:   Cannot display discharge medications since this patient is not currently admitted.           Signed by: Prasad Mckinley PA-C  9/25/2019

## 2019-11-01 ENCOUNTER — OP HISTORICAL/CONVERTED ENCOUNTER (OUTPATIENT)
Dept: OTHER | Age: 54
End: 2019-11-01

## 2019-11-05 ENCOUNTER — HOSPITAL ENCOUNTER (OUTPATIENT)
Dept: MRI IMAGING | Age: 54
Discharge: HOME | End: 2019-11-05
Attending: PHYSICIAN ASSISTANT
Payer: COMMERCIAL

## 2019-11-05 DIAGNOSIS — M54.12 CERVICAL RADICULOPATHY: ICD-10-CM

## 2019-11-05 PROCEDURE — 72141 MRI NECK SPINE W/O DYE: CPT

## 2020-03-12 ENCOUNTER — HOSPITAL ENCOUNTER (OUTPATIENT)
Dept: MRI IMAGING | Age: 55
Discharge: HOME | End: 2020-03-12
Attending: PHYSICIAN ASSISTANT
Payer: COMMERCIAL

## 2020-03-12 DIAGNOSIS — R48.2 APRAXIA: ICD-10-CM

## 2020-03-12 DIAGNOSIS — R42 DIZZINESS: ICD-10-CM

## 2020-03-12 DIAGNOSIS — R51.9 HEAD ACHE: ICD-10-CM

## 2020-03-12 DIAGNOSIS — H93.13 TINNITUS OF BOTH EARS: ICD-10-CM

## 2020-03-12 PROCEDURE — A9575 INJ GADOTERATE MEGLUMI 0.1ML: HCPCS

## 2020-03-12 PROCEDURE — 74011250636 HC RX REV CODE- 250/636

## 2020-03-12 PROCEDURE — 70553 MRI BRAIN STEM W/O & W/DYE: CPT

## 2020-03-12 RX ORDER — GADOTERATE MEGLUMINE 376.9 MG/ML
20 INJECTION INTRAVENOUS
Status: COMPLETED | OUTPATIENT
Start: 2020-03-12 | End: 2020-03-12

## 2020-03-12 RX ADMIN — GADOTERATE MEGLUMINE 20 ML: 376.9 INJECTION INTRAVENOUS at 21:06

## 2020-03-13 ENCOUNTER — APPOINTMENT (OUTPATIENT)
Dept: CT IMAGING | Age: 55
DRG: 065 | End: 2020-03-13
Attending: FAMILY MEDICINE
Payer: COMMERCIAL

## 2020-03-13 ENCOUNTER — APPOINTMENT (OUTPATIENT)
Dept: MRI IMAGING | Age: 55
DRG: 065 | End: 2020-03-13
Attending: FAMILY MEDICINE
Payer: COMMERCIAL

## 2020-03-13 ENCOUNTER — APPOINTMENT (OUTPATIENT)
Dept: VASCULAR SURGERY | Age: 55
DRG: 065 | End: 2020-03-13
Attending: FAMILY MEDICINE
Payer: COMMERCIAL

## 2020-03-13 ENCOUNTER — HOSPITAL ENCOUNTER (INPATIENT)
Age: 55
LOS: 1 days | Discharge: HOME | DRG: 065 | End: 2020-03-15
Attending: EMERGENCY MEDICINE | Admitting: INTERNAL MEDICINE
Payer: COMMERCIAL

## 2020-03-13 DIAGNOSIS — I63.9 CEREBROVASCULAR ACCIDENT (CVA), UNSPECIFIED MECHANISM (HCC): Primary | ICD-10-CM

## 2020-03-13 DIAGNOSIS — D75.839 THROMBOCYTOSIS: ICD-10-CM

## 2020-03-13 PROBLEM — G45.9 TIA (TRANSIENT ISCHEMIC ATTACK): Status: ACTIVE | Noted: 2020-03-13

## 2020-03-13 LAB
ALBUMIN SERPL-MCNC: 4.2 G/DL (ref 3.5–5)
ALBUMIN/GLOB SERPL: 1.1 {RATIO} (ref 1.1–2.2)
ALP SERPL-CCNC: 87 U/L (ref 45–117)
ALT SERPL-CCNC: 26 U/L (ref 12–78)
ANION GAP SERPL CALC-SCNC: 4 MMOL/L (ref 5–15)
APPEARANCE UR: ABNORMAL
AST SERPL-CCNC: 15 U/L (ref 15–37)
BACTERIA URNS QL MICRO: ABNORMAL /HPF
BASOPHILS # BLD: 0.1 K/UL (ref 0–0.1)
BASOPHILS NFR BLD: 1 % (ref 0–1)
BILIRUB SERPL-MCNC: 0.2 MG/DL (ref 0.2–1)
BILIRUB UR QL: NEGATIVE
BUN SERPL-MCNC: 15 MG/DL (ref 6–20)
BUN/CREAT SERPL: 20 (ref 12–20)
CALCIUM SERPL-MCNC: 9.2 MG/DL (ref 8.5–10.1)
CHLORIDE SERPL-SCNC: 107 MMOL/L (ref 97–108)
CO2 SERPL-SCNC: 30 MMOL/L (ref 21–32)
COLOR UR: ABNORMAL
COMMENT, HOLDF: NORMAL
CREAT SERPL-MCNC: 0.75 MG/DL (ref 0.55–1.02)
DIFFERENTIAL METHOD BLD: ABNORMAL
EOSINOPHIL # BLD: 0.2 K/UL (ref 0–0.4)
EOSINOPHIL NFR BLD: 3 % (ref 0–7)
EPITH CASTS URNS QL MICRO: ABNORMAL /LPF
ERYTHROCYTE [DISTWIDTH] IN BLOOD BY AUTOMATED COUNT: 14.7 % (ref 11.5–14.5)
GLOBULIN SER CALC-MCNC: 3.7 G/DL (ref 2–4)
GLUCOSE BLD STRIP.AUTO-MCNC: 90 MG/DL (ref 65–100)
GLUCOSE SERPL-MCNC: 90 MG/DL (ref 65–100)
GLUCOSE UR STRIP.AUTO-MCNC: NEGATIVE MG/DL
HCT VFR BLD AUTO: 41.5 % (ref 35–47)
HGB BLD-MCNC: 13 G/DL (ref 11.5–16)
HGB UR QL STRIP: NEGATIVE
IMM GRANULOCYTES # BLD AUTO: 0.1 K/UL (ref 0–0.04)
IMM GRANULOCYTES NFR BLD AUTO: 1 % (ref 0–0.5)
KETONES UR QL STRIP.AUTO: NEGATIVE MG/DL
LEUKOCYTE ESTERASE UR QL STRIP.AUTO: ABNORMAL
LYMPHOCYTES # BLD: 1.3 K/UL (ref 0.8–3.5)
LYMPHOCYTES NFR BLD: 21 % (ref 12–49)
MCH RBC QN AUTO: 32.1 PG (ref 26–34)
MCHC RBC AUTO-ENTMCNC: 31.3 G/DL (ref 30–36.5)
MCV RBC AUTO: 102.5 FL (ref 80–99)
MONOCYTES # BLD: 0.5 K/UL (ref 0–1)
MONOCYTES NFR BLD: 8 % (ref 5–13)
NEUTS SEG # BLD: 4 K/UL (ref 1.8–8)
NEUTS SEG NFR BLD: 66 % (ref 32–75)
NITRITE UR QL STRIP.AUTO: NEGATIVE
NRBC # BLD: 0 K/UL (ref 0–0.01)
NRBC BLD-RTO: 0 PER 100 WBC
PH UR STRIP: 7.5 [PH] (ref 5–8)
PLATELET # BLD AUTO: 913 K/UL (ref 150–400)
PLATELET COMMENTS,PCOM: ABNORMAL
PMV BLD AUTO: 11.6 FL (ref 8.9–12.9)
POTASSIUM SERPL-SCNC: 3.9 MMOL/L (ref 3.5–5.1)
PROT SERPL-MCNC: 7.9 G/DL (ref 6.4–8.2)
PROT UR STRIP-MCNC: NEGATIVE MG/DL
RBC # BLD AUTO: 4.05 M/UL (ref 3.8–5.2)
RBC #/AREA URNS HPF: ABNORMAL /HPF (ref 0–5)
RBC MORPH BLD: ABNORMAL
RBC MORPH BLD: ABNORMAL
SAMPLES BEING HELD,HOLD: NORMAL
SERVICE CMNT-IMP: NORMAL
SODIUM SERPL-SCNC: 141 MMOL/L (ref 136–145)
SP GR UR REFRACTOMETRY: 1.02 (ref 1–1.03)
UA: UC IF INDICATED,UAUC: ABNORMAL
UROBILINOGEN UR QL STRIP.AUTO: 0.2 EU/DL (ref 0.2–1)
WBC # BLD AUTO: 6.2 K/UL (ref 3.6–11)
WBC URNS QL MICRO: ABNORMAL /HPF (ref 0–4)

## 2020-03-13 PROCEDURE — 74011000258 HC RX REV CODE- 258: Performed by: RADIOLOGY

## 2020-03-13 PROCEDURE — 85025 COMPLETE CBC W/AUTO DIFF WBC: CPT

## 2020-03-13 PROCEDURE — 99218 HC RM OBSERVATION: CPT

## 2020-03-13 PROCEDURE — 81001 URINALYSIS AUTO W/SCOPE: CPT

## 2020-03-13 PROCEDURE — 70496 CT ANGIOGRAPHY HEAD: CPT

## 2020-03-13 PROCEDURE — 36415 COLL VENOUS BLD VENIPUNCTURE: CPT

## 2020-03-13 PROCEDURE — 96372 THER/PROPH/DIAG INJ SC/IM: CPT

## 2020-03-13 PROCEDURE — 82962 GLUCOSE BLOOD TEST: CPT

## 2020-03-13 PROCEDURE — 74011250636 HC RX REV CODE- 250/636: Performed by: FAMILY MEDICINE

## 2020-03-13 PROCEDURE — 74011636320 HC RX REV CODE- 636/320: Performed by: RADIOLOGY

## 2020-03-13 PROCEDURE — 74011250637 HC RX REV CODE- 250/637: Performed by: FAMILY MEDICINE

## 2020-03-13 PROCEDURE — 72156 MRI NECK SPINE W/O & W/DYE: CPT

## 2020-03-13 PROCEDURE — 80053 COMPREHEN METABOLIC PANEL: CPT

## 2020-03-13 PROCEDURE — 80186 ASSAY OF PHENYTOIN FREE: CPT

## 2020-03-13 PROCEDURE — 74011250637 HC RX REV CODE- 250/637: Performed by: EMERGENCY MEDICINE

## 2020-03-13 PROCEDURE — A9575 INJ GADOTERATE MEGLUMI 0.1ML: HCPCS | Performed by: FAMILY MEDICINE

## 2020-03-13 PROCEDURE — 70450 CT HEAD/BRAIN W/O DYE: CPT

## 2020-03-13 PROCEDURE — 70498 CT ANGIOGRAPHY NECK: CPT

## 2020-03-13 PROCEDURE — 80185 ASSAY OF PHENYTOIN TOTAL: CPT

## 2020-03-13 PROCEDURE — 87086 URINE CULTURE/COLONY COUNT: CPT

## 2020-03-13 PROCEDURE — 93971 EXTREMITY STUDY: CPT

## 2020-03-13 PROCEDURE — 99285 EMERGENCY DEPT VISIT HI MDM: CPT

## 2020-03-13 RX ORDER — GADOTERATE MEGLUMINE 376.9 MG/ML
20 INJECTION INTRAVENOUS
Status: COMPLETED | OUTPATIENT
Start: 2020-03-13 | End: 2020-03-13

## 2020-03-13 RX ORDER — HYDRALAZINE HYDROCHLORIDE 20 MG/ML
10 INJECTION INTRAMUSCULAR; INTRAVENOUS
Status: DISCONTINUED | OUTPATIENT
Start: 2020-03-13 | End: 2020-03-15 | Stop reason: HOSPADM

## 2020-03-13 RX ORDER — HEPARIN SODIUM 5000 [USP'U]/ML
5000 INJECTION, SOLUTION INTRAVENOUS; SUBCUTANEOUS EVERY 8 HOURS
Status: DISCONTINUED | OUTPATIENT
Start: 2020-03-13 | End: 2020-03-15 | Stop reason: HOSPADM

## 2020-03-13 RX ORDER — GUAIFENESIN 100 MG/5ML
324 LIQUID (ML) ORAL
Status: COMPLETED | OUTPATIENT
Start: 2020-03-13 | End: 2020-03-13

## 2020-03-13 RX ORDER — ACETAMINOPHEN 650 MG/1
650 SUPPOSITORY RECTAL
Status: DISCONTINUED | OUTPATIENT
Start: 2020-03-13 | End: 2020-03-15 | Stop reason: HOSPADM

## 2020-03-13 RX ORDER — IPRATROPIUM BROMIDE AND ALBUTEROL SULFATE 2.5; .5 MG/3ML; MG/3ML
3 SOLUTION RESPIRATORY (INHALATION)
Status: DISCONTINUED | OUTPATIENT
Start: 2020-03-13 | End: 2020-03-14

## 2020-03-13 RX ORDER — ATORVASTATIN CALCIUM 40 MG/1
80 TABLET, FILM COATED ORAL
Status: DISCONTINUED | OUTPATIENT
Start: 2020-03-13 | End: 2020-03-15 | Stop reason: HOSPADM

## 2020-03-13 RX ORDER — FAMOTIDINE 20 MG/1
20 TABLET, FILM COATED ORAL EVERY 12 HOURS
Status: DISCONTINUED | OUTPATIENT
Start: 2020-03-13 | End: 2020-03-15 | Stop reason: HOSPADM

## 2020-03-13 RX ORDER — PHENYTOIN SODIUM 100 MG/1
300 CAPSULE, EXTENDED RELEASE ORAL
Status: DISCONTINUED | OUTPATIENT
Start: 2020-03-14 | End: 2020-03-15 | Stop reason: HOSPADM

## 2020-03-13 RX ORDER — ASPIRIN 325 MG
325 TABLET ORAL DAILY
Status: DISCONTINUED | OUTPATIENT
Start: 2020-03-14 | End: 2020-03-15 | Stop reason: HOSPADM

## 2020-03-13 RX ORDER — SODIUM CHLORIDE 0.9 % (FLUSH) 0.9 %
10 SYRINGE (ML) INJECTION
Status: COMPLETED | OUTPATIENT
Start: 2020-03-13 | End: 2020-03-13

## 2020-03-13 RX ORDER — SODIUM CHLORIDE 9 MG/ML
100 INJECTION, SOLUTION INTRAVENOUS CONTINUOUS
Status: DISCONTINUED | OUTPATIENT
Start: 2020-03-13 | End: 2020-03-14

## 2020-03-13 RX ORDER — PHENYTOIN SODIUM 100 MG/1
300 CAPSULE, EXTENDED RELEASE ORAL
COMMUNITY

## 2020-03-13 RX ORDER — ACETAMINOPHEN 325 MG/1
650 TABLET ORAL
Status: DISCONTINUED | OUTPATIENT
Start: 2020-03-13 | End: 2020-03-15 | Stop reason: HOSPADM

## 2020-03-13 RX ADMIN — SODIUM CHLORIDE 100 ML/HR: 900 INJECTION, SOLUTION INTRAVENOUS at 22:49

## 2020-03-13 RX ADMIN — Medication 10 ML: at 20:42

## 2020-03-13 RX ADMIN — SODIUM CHLORIDE 100 ML: 900 INJECTION, SOLUTION INTRAVENOUS at 23:57

## 2020-03-13 RX ADMIN — Medication 10 ML: at 22:48

## 2020-03-13 RX ADMIN — HEPARIN SODIUM 5000 UNITS: 5000 INJECTION INTRAVENOUS; SUBCUTANEOUS at 22:52

## 2020-03-13 RX ADMIN — IOPAMIDOL 100 ML: 755 INJECTION, SOLUTION INTRAVENOUS at 23:57

## 2020-03-13 RX ADMIN — ATORVASTATIN CALCIUM 80 MG: 40 TABLET, FILM COATED ORAL at 22:55

## 2020-03-13 RX ADMIN — GADOTERATE MEGLUMINE 20 ML: 376.9 INJECTION INTRAVENOUS at 20:42

## 2020-03-13 RX ADMIN — FAMOTIDINE 20 MG: 20 TABLET ORAL at 22:54

## 2020-03-13 RX ADMIN — ASPIRIN 81 MG 324 MG: 81 TABLET ORAL at 15:15

## 2020-03-13 NOTE — LETTER
[address] 
[address] [facility] [address] [id number] 
[phone] Work/School Note Date: 3/13/2020 To Whom It May concern: Sarah Beth Vogt was seen and treated in the hospital from 3/13/2020 to 3/15/2020 for stroke with vision changes. Recommend atleast 4 weeks off work and driving and cleared by Neurology Sarah Beth Vogt may return to work on * can not determine now\" Sincerely, Hayley Moreira MD

## 2020-03-13 NOTE — PROGRESS NOTES
Admission Medication Reconciliation:    Information obtained from:  patient, chart review, insurance claim information  RxQuery data available¹:  YES    Comments/Recommendations: All medications/allergies have been reviewed and updated; last medication administration times reviewed and recorded. The patient was an excellent historian and report the only medication she currently takes is phenytoin 330 mg daily at 0700. Changes made to Prior to Admission (PTA) Medication List:   ?   Medications Added:   - None   ? Medications Changed:   - None   ? Medications Removed:   - diazepam 5 mg Q6H PRN  - Miralax, pericolace     ¹RxQuery pharmacy benefit data reflects medications filled and processed through the patient's insurance, however   this data does NOT capture whether the medication was picked up or is currently being taken by the patient. Allergies:  Patient has no known allergies. Significant PMH/Disease States:   Past Medical History:   Diagnosis Date    Arthritis     Chronic pain     bilateral hips and right knee    Morbid obesity with BMI of 45.0-49.9, adult ([facility] [address] 4/20/2016    Motion sickness     when not eating    MRSA carrier 9/13/2019    Seizures ([facility] [address]     since age 1 last seizure 2006; on dilantin (drug level checked annually by PCP)    Thrombocytosis ([facility] [address] 9/12/2019       Chief Complaint for this Admission:    Chief Complaint   Patient presents with    Numbness     MRI of brain confirmed last night that pt had a CVA. Reports confusion, intermittent slurred speech, tingling fingers x weeks. Sent here by MD.        Prior to Admission Medications:   Prior to Admission Medications   Prescriptions Last Dose Informant Patient Reported? Taking? phenytoin ER (Dilantin Extended) 100 mg ER capsule 3/13/2020 at Unknown time  Yes Yes   Sig: Take 300 mg by mouth Daily (before breakfast).  Take with Dilantin 30 mg capsule for total daily dose of 330 mg every morning   phenytoin ER (Dilantin) 30 mg ER capsule 3/13/2020 at Unknown time  Yes Yes   Sig: Take 30 mg by mouth daily. Take with Dilantin 300 mg capsule for total daily dose of 330 mg every morning      Facility-Administered Medications: None       Thank you for allowing pharmacy to participate in the coordination of this patient's care. If you have any other questions, please contact the medication reconciliation pharmacist at x [phone]. Kate Cade, Pharm. D., BCPS

## 2020-03-13 NOTE — ED PROVIDER NOTES
69-year-old female with history of morbid obesity, seizures that reportedly have been well controlled since 2006, on Dilantin, and arthritis presents to the emergency department stating that over the last approximately 3 weeks she had headache, with associated intermittent vertigo type symptoms and slurred speech. She was following up with her primary care provider and concern was raised that this may be due to her Dilantin side effects, however patient denies any recent seizures. She states that on Monday she returned to work and when she did so she was unable to read her computer screen and continued to have intermittent vertigo type symptoms and slurred speech she was sent for MRI yesterday by her primary care provider and was called today saying that unfortunately it appears that her MRI of her brain shows evidence of a subacute stroke. She was advised to present to the emergency department for further evaluation and likely admission. She currently does not follow with a neurologist as her seizure disorder has been well controlled reportedly for years. Any history of prior CVA. No recent head trauma. No fever, chills, or any other medical concerns.            Past Medical History:   Diagnosis Date    Arthritis     Chronic pain     bilateral hips and right knee    Morbid obesity with BMI of 45.0-49.9, adult ([facility] [address] 2016    Motion sickness     when not eating    MRSA carrier 2019    Seizures [facility])     since age 1 last seizure ; on dilantin (drug level checked annually by PCP)    Thrombocytosis ([facility] [address] 2019       Past Surgical History:   Procedure Laterality Date    HX BREAST BIOPSY Left     NEG    HX  SECTION      x 2    HX HEENT      LASIK    HX KNEE ARTHROSCOPY Bilateral     HX KNEE REPLACEMENT Right 2018    HX ORTHOPAEDIC Left     TKR    HX ORTH[address] Left     [address]    HX ORTHOPAEDIC Right     RTH    HX OTHER SURGICAL      BIOPSY LEFT LOWER LEG(AGE 23)    HX TONSILLECTOMY      HX TUBAL LIGATION           Family History:   Problem Relation Age of Onset   Laurita Galindo Cancer Mother 48        breast cancer    Diabetes Mother     Anesth Problems Mother         PONV    Heart Disease Father     Hypertension Father     No Known Problems Brother        Social History     Socioeconomic History    Marital status:      Spouse name: Not on file    Number of children: Not on file    Years of education: Not on file    Highest education level: Not on file   Occupational History    Not on file   Social Needs    Financial resource strain: Not on file    Food insecurity     Worry: Not on file     Inability: Not on file    Transportation needs     Medical: Not on file     Non-medical: Not on file   Tobacco Use    Smoking status: Never Smoker    Smokeless tobacco: Never Used   Substance and Sexual Activity    Alcohol use: Yes     Comment: <1/wk    Drug use: No    Sexual activity: Not on file   Lifestyle    Physical activity     Days per week: Not on file     Minutes per session: Not on file    Stress: Not on file   Relationships    Social connections     Talks on phone: Not on file     Gets together: Not on file     Attends [employer] service: Not on file     Active member of club or organization: Not on file     Attends meetings of clubs or organizations: Not on file     Relationship status: Not on file    Intimate partner violence     Fear of current or ex partner: Not on file     Emotionally abused: Not on file     Physically abused: Not on file     Forced sexual activity: Not on file   Other Topics Concern    Not on file   Social History Narrative    Not on file         ALLERGIES: Patient has no known allergies. Review of Systems   Constitutional: Positive for activity change. Negative for appetite change, chills and fever. HENT: Negative for congestion, rhinorrhea, sinus pressure, sneezing and sore throat.     Eyes: Negative for photophobia and visual disturbance. Respiratory: Negative for cough and shortness of breath. Cardiovascular: Negative for chest pain. Gastrointestinal: Negative for abdominal pain, blood in stool, constipation, diarrhea, nausea and vomiting. Genitourinary: Negative for difficulty urinating, dysuria, flank pain, frequency, hematuria, menstrual problem, urgency, vaginal bleeding and vaginal discharge. Musculoskeletal: Negative for arthralgias, back pain, myalgias and neck pain. Skin: Negative for rash and wound. Neurological: Positive for dizziness, speech difficulty and headaches. Negative for syncope, weakness, light-headedness and numbness. Psychiatric/Behavioral: Positive for confusion. Negative for self-injury and suicidal ideas. All other systems reviewed and are negative. Vitals:    03/13/20 1141 03/13/20 1335 03/13/20 1400 03/13/20 1430   BP: (!) 160/132  124/77 138/68   Pulse: 70  69 69   Resp: 13  18 11   Temp: 97.9 °F (36.6 °C)      SpO2: 95% 98% 97% 98%   Height: 5' 8\" (1.727 m)               Physical Exam  Vitals signs and nursing note reviewed. Constitutional:       General: She is not in acute distress. Appearance: Normal appearance. She is well-developed. She is obese. She is not diaphoretic. HENT:      Head: Normocephalic and atraumatic. Nose: Nose normal.   Eyes:      Extraocular Movements: Extraocular movements intact. Conjunctiva/sclera: Conjunctivae normal.      Pupils: Pupils are equal, round, and reactive to light. Neck:      Musculoskeletal: Neck supple. Cardiovascular:      Rate and Rhythm: Normal rate and regular rhythm. Heart sounds: Normal heart sounds. Pulmonary:      Effort: Pulmonary effort is normal.      Breath sounds: Normal breath sounds. Abdominal:      General: There is no distension. Palpations: Abdomen is soft. Tenderness: There is no abdominal tenderness. Musculoskeletal:         General: No tenderness.    Skin:     General: Skin is warm and dry. Neurological:      General: No focal deficit present. Mental Status: She is alert and oriented to person, place, and time. Cranial Nerves: No cranial nerve deficit. Sensory: No sensory deficit. Motor: No weakness. Coordination: Coordination normal.      Comments: Intact sensation, 5/5 strength in all 4 extremities, intact finger to nose, neg pronator drift, fluent speech, CN intact. MDM   49-year-old female with a patient MRI last night showing evidence of subacute CVA. Labs are significant for elevated PLT at 913, otherwise reassuring. Will add on Dilantin level to further evaluate. We will admit to the hospitalist service for further evaluation by neurology. Procedures    Hospitalist Perfect Serve for Admission  2:36 PM    ED Room Number: MM26/06  Patient Name and age:  Franklin Shaw 62 y.o.  female  Working Diagnosis:   1. Cerebrovascular accident (CVA), unspecified mechanism ([facility] [address]    2. Thrombocytosis ([facility] [address]      Readmission: no  Isolation Requirements:  no  Recommended Level of Care:  med/surg  Code Status:  Full Code  Department:[facility] Adult ED - 21   Other:  Subacute CVA noted on outpatient MRI for neuro sx x 3 weeks.  Has thrombocytosis but otherwise normal blood work, on no blood thinners, now neuro intact on exam.

## 2020-03-13 NOTE — ROUTINE PROCESS
TRANSFER - OUT REPORT:    Verbal report given to Nicolas Rosado RN(name) on Gómez Delgado  being transferred to 6S(unit) for routine progression of care       Report consisted of patients Situation, Background, Assessment and   Recommendations(SBAR). Information from the following report(s) SBAR, ED Summary, Intake/Output, MAR and Recent Results was reviewed with the receiving nurse. Lines:   Peripheral IV 03/13/20 Left Antecubital (Active)   Site Assessment Clean, dry, & intact 3/13/2020 12:09 PM   Phlebitis Assessment 0 3/13/2020 12:09 PM   Infiltration Assessment 0 3/13/2020 12:09 PM   Dressing Status Clean, dry, & intact 3/13/2020 12:09 PM   Dressing Type Transparent 3/13/2020 12:09 PM   Hub Color/Line Status Pink;Flushed;Patent 3/13/2020 12:09 PM   Action Taken Blood drawn 3/13/2020 12:09 PM        Opportunity for questions and clarification was provided.       Patient transported with:   [employer]

## 2020-03-13 NOTE — ED TRIAGE NOTES
Triage Note: MRI of brain confirmed last night that pt had a CVA. Reports confusion, intermittent slurred speech, tingling fingers x weeks.  Sent here by MD

## 2020-03-14 ENCOUNTER — APPOINTMENT (OUTPATIENT)
Dept: NON INVASIVE DIAGNOSTICS | Age: 55
DRG: 065 | End: 2020-03-14
Attending: FAMILY MEDICINE
Payer: COMMERCIAL

## 2020-03-14 PROBLEM — G45.9 TIA (TRANSIENT ISCHEMIC ATTACK): Status: RESOLVED | Noted: 2020-03-13 | Resolved: 2020-03-14

## 2020-03-14 PROBLEM — I63.9 CVA (CEREBRAL VASCULAR ACCIDENT) (HCC): Status: ACTIVE | Noted: 2020-03-14

## 2020-03-14 LAB
AV VELOCITY RATIO: 0.64
BACTERIA SPEC CULT: NORMAL
CC UR VC: NORMAL
CHOLEST SERPL-MCNC: 201 MG/DL
ECHO AO ROOT DIAM: 3.2 CM
ECHO AV AREA PEAK VELOCITY: 2.1 CM2
ECHO AV PEAK GRADIENT: 10.9 MMHG
ECHO AV PEAK VELOCITY: 165.16 CM/S
ECHO EST RA PRESSURE: 3 MMHG
ECHO LA AREA 4C: 27.8 CM2
ECHO LA MAJOR AXIS: 4.72 CM
ECHO LA TO AORTIC ROOT RATIO: 1.48
ECHO LA VOL 2C: 111.17 ML (ref 22–52)
ECHO LA VOL 4C: 88.43 ML (ref 22–52)
ECHO LA VOL BP: 102 ML (ref 22–52)
ECHO LA VOL/BSA BIPLANE: 41.77 ML/M2 (ref 16–28)
ECHO LA VOLUME INDEX A2C: 45.52 ML/M2 (ref 16–28)
ECHO LA VOLUME INDEX A4C: 36.21 ML/M2 (ref 16–28)
ECHO LV E' LATERAL VELOCITY: 7.55 CM/S
ECHO LV E' SEPTAL VELOCITY: 7.9 CM/S
ECHO LV INTERNAL DIMENSION DIASTOLIC: 5.65 CM (ref 3.9–5.3)
ECHO LV INTERNAL DIMENSION SYSTOLIC: 3.78 CM
ECHO LV IVSD: 0.85 CM (ref 0.6–0.9)
ECHO LV MASS 2D: 224.8 G (ref 67–162)
ECHO LV MASS INDEX 2D: 92.1 G/M2 (ref 43–95)
ECHO LV POSTERIOR WALL DIASTOLIC: 0.93 CM (ref 0.6–0.9)
ECHO LVOT DIAM: 2.05 CM
ECHO LVOT PEAK GRADIENT: 4.5 MMHG
ECHO LVOT PEAK VELOCITY: 106.08 CM/S
ECHO MV A VELOCITY: 74.71 CM/S
ECHO MV AREA PHT: 3.8 CM2
ECHO MV E DECELERATION TIME (DT): 198 MS
ECHO MV E VELOCITY: 91.85 CM/S
ECHO MV E/A RATIO: 1.23
ECHO MV E/E' LATERAL: 12.17
ECHO MV E/E' RATIO (AVERAGED): 11.9
ECHO MV E/E' SEPTAL: 11.63
ECHO MV PRESSURE HALF TIME (PHT): 57.4 MS
ECHO PULMONARY ARTERY SYSTOLIC PRESSURE (PASP): 35.8 MMHG
ECHO PV MAX VELOCITY: 74.19 CM/S
ECHO PV PEAK GRADIENT: 2.2 MMHG
ECHO RIGHT VENTRICULAR SYSTOLIC PRESSURE (RVSP): 35.8 MMHG
ECHO RV INTERNAL DIMENSION: 4.22 CM
ECHO RV TAPSE: 1.94 CM (ref 1.5–2)
ECHO TV REGURGITANT MAX VELOCITY: 286.36 CM/S
ECHO TV REGURGITANT PEAK GRADIENT: 32.8 MMHG
ERYTHROCYTE [DISTWIDTH] IN BLOOD BY AUTOMATED COUNT: 14.6 % (ref 11.5–14.5)
EST. AVERAGE GLUCOSE BLD GHB EST-MCNC: 108 MG/DL
FERRITIN SERPL-MCNC: 24 NG/ML (ref 8–252)
GLUCOSE BLD STRIP.AUTO-MCNC: 97 MG/DL (ref 65–100)
HBA1C MFR BLD: 5.4 % (ref 4–5.6)
HCT VFR BLD AUTO: 36.6 % (ref 35–47)
HDLC SERPL-MCNC: 80 MG/DL
HDLC SERPL: 2.5 {RATIO} (ref 0–5)
HGB BLD-MCNC: 11.6 G/DL (ref 11.5–16)
IRON SATN MFR SERPL: 42 % (ref 20–50)
IRON SERPL-MCNC: 129 UG/DL (ref 35–150)
LDLC SERPL CALC-MCNC: 100.4 MG/DL (ref 0–100)
LIPID PROFILE,FLP: ABNORMAL
LVFS 2D: 33.06 %
MCH RBC QN AUTO: 32.3 PG (ref 26–34)
MCHC RBC AUTO-ENTMCNC: 31.7 G/DL (ref 30–36.5)
MCV RBC AUTO: 101.9 FL (ref 80–99)
MV DEC SLOPE: 4.64
NRBC # BLD: 0 K/UL (ref 0–0.01)
NRBC BLD-RTO: 0 PER 100 WBC
PHENYTOIN FREE MFR SERPL: ABNORMAL %
PHENYTOIN FREE SERPL-MCNC: <0.4 UG/ML (ref 1–2)
PHENYTOIN SERPL-MCNC: 13.7 UG/ML (ref 10–20)
PLATELET # BLD AUTO: 724 K/UL (ref 150–400)
PMV BLD AUTO: 11.8 FL (ref 8.9–12.9)
RBC # BLD AUTO: 3.59 M/UL (ref 3.8–5.2)
SERVICE CMNT-IMP: NORMAL
TIBC SERPL-MCNC: 308 UG/DL (ref 250–450)
TRIGL SERPL-MCNC: 103 MG/DL (ref ?–150)
VLDLC SERPL CALC-MCNC: 20.6 MG/DL
WBC # BLD AUTO: 5.8 K/UL (ref 3.6–11)

## 2020-03-14 PROCEDURE — 83036 HEMOGLOBIN GLYCOSYLATED A1C: CPT

## 2020-03-14 PROCEDURE — 82728 ASSAY OF FERRITIN: CPT

## 2020-03-14 PROCEDURE — 97535 SELF CARE MNGMENT TRAINING: CPT

## 2020-03-14 PROCEDURE — 97112 NEUROMUSCULAR REEDUCATION: CPT

## 2020-03-14 PROCEDURE — 97530 THERAPEUTIC ACTIVITIES: CPT

## 2020-03-14 PROCEDURE — 83540 ASSAY OF IRON: CPT

## 2020-03-14 PROCEDURE — 36415 COLL VENOUS BLD VENIPUNCTURE: CPT

## 2020-03-14 PROCEDURE — 74011250637 HC RX REV CODE- 250/637: Performed by: FAMILY MEDICINE

## 2020-03-14 PROCEDURE — 81219 CALR GENE COM VARIANTS: CPT

## 2020-03-14 PROCEDURE — 65660000000 HC RM CCU STEPDOWN

## 2020-03-14 PROCEDURE — 97161 PT EVAL LOW COMPLEX 20 MIN: CPT

## 2020-03-14 PROCEDURE — 99218 HC RM OBSERVATION: CPT

## 2020-03-14 PROCEDURE — 80061 LIPID PANEL: CPT

## 2020-03-14 PROCEDURE — 85027 COMPLETE CBC AUTOMATED: CPT

## 2020-03-14 PROCEDURE — 97165 OT EVAL LOW COMPLEX 30 MIN: CPT

## 2020-03-14 PROCEDURE — 74011250636 HC RX REV CODE- 250/636: Performed by: FAMILY MEDICINE

## 2020-03-14 PROCEDURE — 97116 GAIT TRAINING THERAPY: CPT

## 2020-03-14 PROCEDURE — 93306 TTE W/DOPPLER COMPLETE: CPT

## 2020-03-14 PROCEDURE — 96372 THER/PROPH/DIAG INJ SC/IM: CPT

## 2020-03-14 PROCEDURE — 82962 GLUCOSE BLOOD TEST: CPT

## 2020-03-14 PROCEDURE — 81270 JAK2 GENE: CPT

## 2020-03-14 RX ORDER — IPRATROPIUM BROMIDE AND ALBUTEROL SULFATE 2.5; .5 MG/3ML; MG/3ML
3 SOLUTION RESPIRATORY (INHALATION)
Status: DISCONTINUED | OUTPATIENT
Start: 2020-03-14 | End: 2020-03-14

## 2020-03-14 RX ORDER — IPRATROPIUM BROMIDE AND ALBUTEROL SULFATE 2.5; .5 MG/3ML; MG/3ML
3 SOLUTION RESPIRATORY (INHALATION)
Status: DISCONTINUED | OUTPATIENT
Start: 2020-03-14 | End: 2020-03-15 | Stop reason: HOSPADM

## 2020-03-14 RX ADMIN — HEPARIN SODIUM 5000 UNITS: 5000 INJECTION INTRAVENOUS; SUBCUTANEOUS at 13:22

## 2020-03-14 RX ADMIN — ACETAMINOPHEN 650 MG: 325 TABLET ORAL at 04:39

## 2020-03-14 RX ADMIN — HEPARIN SODIUM 5000 UNITS: 5000 INJECTION INTRAVENOUS; SUBCUTANEOUS at 04:26

## 2020-03-14 RX ADMIN — ATORVASTATIN CALCIUM 80 MG: 40 TABLET, FILM COATED ORAL at 22:53

## 2020-03-14 RX ADMIN — FAMOTIDINE 20 MG: 20 TABLET ORAL at 22:53

## 2020-03-14 RX ADMIN — HEPARIN SODIUM 5000 UNITS: 5000 INJECTION INTRAVENOUS; SUBCUTANEOUS at 22:54

## 2020-03-14 RX ADMIN — PHENYTOIN SODIUM 30 MG: 100 CAPSULE ORAL at 07:42

## 2020-03-14 RX ADMIN — ASPIRIN 325 MG ORAL TABLET 325 MG: 325 PILL ORAL at 08:58

## 2020-03-14 RX ADMIN — FAMOTIDINE 20 MG: 20 TABLET ORAL at 08:58

## 2020-03-14 RX ADMIN — PHENYTOIN SODIUM 300 MG: 100 CAPSULE ORAL at 07:42

## 2020-03-14 NOTE — PROGRESS NOTES
OCCUPATIONAL THERAPY EVALUATION/DISCHARGE  Patient: Albert Whitley (58 y.o. female)  Date: 3/14/2020  Primary Diagnosis: TIA (transient ischemic attack) [G45.9]  CVA (cerebral vascular accident) ([facility] [address] [I63.9]       Precautions:        ASSESSMENT  Based on the objective data described below, the patient presents with decreased vision and visual comprehension/preception s/p admission for TIA workup, found to have a subacute CVA. PTA, patient reports she was IND for ADLs and mobility, just cleared to return to work s/p cervical spine sx, however difficulty reading the computer screen. She now presents just slightly below her baseline, remains with some word finding difficulty and decreased vision, visual perception, and contrast sensitivity noted with visual scanning tasks. Despite vision difficulties, patient is largely SPV for ADLs and mobility but compensates for decreased visual perception and processing. She has a supportive  who can assist with ADLs/IADLs PRN and would be safe to d/c home from OT standpoint, however recommend follow up with OP visual OT. Current Level of Function (ADLs/self-care): SPV for ADLs and mobility    Functional Outcome Measure:  No BUE deficits noted, Fugl Keenan not indicated.  She scored 80/100 on the Barthel Index, indicating 20% impairment in ADLs and mobility, infer 25% assist for IADLs d/t decreased vision    Other factors to consider for discharge: decreased vision, significant orthopedic sx history, good support at home     PLAN :  Recommendation for discharge: (in order for the patient to meet his/her long term goals)  Outpatient occupational therapy follow up recommended for vision    This discharge recommendation:  Has not yet been discussed the attending provider and/or case management    IF patient discharges home will need the following DME: none       SUBJECTIVE:   Patient stated Well I think there's three fingers but I'm not sure, I only say that because of the width.    OBJECTIVE DATA SUMMARY:   HISTORY:   Past Medical History:   Diagnosis Date    Arthritis     Chronic pain     bilateral hips and right knee    Morbid obesity with BMI of 45.0-49.9, adult ([facility] [address] 2016    Motion sickness     when not eating    MRSA carrier 2019    Seizures ([facility] [address]     since age 1 last seizure 2006; on dilantin (drug level checked annually by PCP)    Thrombocytosis ([facility] [address] 2019     Past Surgical History:   Procedure Laterality Date    HX BREAST BIOPSY Left     NEG    HX  SECTION      x 2    HX HEENT      LASIK    HX KNEE ARTHROSCOPY Bilateral     HX KNEE REPLACEMENT Right 2018    HX ORTHOPAEDIC Left     TKR    HX OR[address] Left     [address]    HX ORTHOPAEDIC Right     RTH    HX OTHER SURGICAL      BIOPSY LEFT LOWER LEG(AGE 23)    HX TONSILLECTOMY      HX TUBAL LIGATION         Prior Level of Function/Environment/Context: IND, lives with spouse, hx of multiple orthopedic surgeries (spine, back, hips), works full time at a desk on the computer  Expanded or extensive additional review of patient history:     Home Situation  Home Environment: Private residence  # Steps to Enter: 5  Rails to Enter: Yes  Hand Rails : Right  One/Two Story Residence: Two story, live on 1st floor  Living Alone: No  Support Systems: Spouse/Significant Other/Partner, Family member(s)  Patient Expects to be Discharged to[de-identified] Private residence  Current DME Used/Available at Home: [employer], straight, Walker, rolling, Raised toilet seat, Shower chair  Tub or Shower Type: Tub/Shower combination    Hand dominance: Right    EXAMINATION OF PERFORMANCE DEFICITS:  Cognitive/Behavioral Status:  Neurologic State: Alert  Orientation Level: Oriented X4  Cognition: Appropriate for age attention/concentration; Follows commands  Perception: Appears intact  Perseveration: No perseveration noted  Safety/Judgement: Decreased awareness of environment; Fall prevention    Skin: Appears intact    Edema: None    Hearing: Auditory  Auditory Impairment: None    Vision/Perceptual:    Tracking: Able to track stimulus in all quadrants w/o difficulty                 Diplopia: No    Acuity: (decreased, jhoan with visual comprehension)    Corrective Lenses: Reading glasses    Range of Motion:  AROM: Within functional limits  PROM: Within functional limits                      Strength:  Strength: Within functional limits                Coordination:  Coordination: Within functional limits  Fine Motor Skills-Upper: Right Intact; Left Intact    Gross Motor Skills-Upper: Left Intact; Right Intact    Tone & Sensation:  Tone: Normal  Sensation: Intact                      Balance:  Sitting: Intact  Standing: Intact    Functional Mobility and Transfers for ADLs:  Bed Mobility:       Transfers:  Sit to Stand: Supervision  Stand to Sit: Supervision  Bathroom Mobility: Supervision/set up  Toilet Transfer : Supervision    ADL Assessment:  Feeding: Independent    Oral Facial Hygiene/Grooming: Supervision    Bathing: Supervision    Upper Body Dressing: Independent    Lower Body Dressing: Supervision    Toileting: Supervision                ADL Intervention and task modifications:     Lower Body Dressing Assistance  Dressing Assistance: Supervision  Pants With Elastic Waist: Supervision(simulated)  Socks: Supervision  Leg Crossed Method Used: No  Position Performed: Bending forward method;Seated in chair;Standing  Cues: Verbal cues provided         Cognitive Retraining  Safety/Judgement: Decreased awareness of environment; Fall prevention    Therapeutic Exercise:  Ambulation to/from bathroom with SPV    Neuromuscular Re-Education  Smooth pursuits completed with static and moving targets, increased time to adjust with moving targets and some jagged lateral eye movements  Completed visual contrast sensitivity activities with clock in room (unable to read despite max cues), able to locate 7/10 columns in photo with increased difficulty with lighter contrast      Functional Measure:  Barthel Index:    Bathin  Bladder: 10  Bowels: 10  Groomin  Dressing: 10  Feeding: 10  Mobility: 10  Stairs: 0  Toilet Use: 10  Transfer (Bed to Chair and Back): 10  Total: 80/100        The Barthel ADL Index: Guidelines  1. The index should be used as a record of what a patient does, not as a record of what a patient could do. 2. The main aim is to establish degree of independence from any help, physical or verbal, however minor and for whatever reason. 3. The need for supervision renders the patient not independent. 4. A patient's performance should be established using the best available evidence. Asking the patient, friends/relatives and nurses are the usual sources, but direct observation and common sense are also important. However direct testing is not needed. 5. Usually the patient's performance over the preceding 24-48 hours is important, but occasionally longer periods will be relevant. 6. Middle categories imply that the patient supplies over 50 per cent of the effort. 7. Use of aids to be independent is allowed. Wesley Herrera., Barthel, D.W. ([id number]). Functional evaluation: the Barthel Index. [address] [facility] (14)2. Jessica Talbot ajay RULA FreedF, Lon Hawthorne., Sudhakar Valdivia., [place], [address] [place] [address] (). Measuring the change indisability after inpatient rehabilitation; comparison of the responsiveness of the Barthel Index and Functional [place] Measure. Journal of Neurology, Neurosurgery, and Psychiatry, 66(4), 092-971. Geovani Aaron, N.J.A, KEEGAN Estevez.PERLA, & Jean Marie Jc MSAMMY. (2004.) Assessment of post-stroke quality of life in cost-effectiveness studies: The usefulness of the Barthel Index and the EuroQoL-5D.  Quality of Life Research, 15, 614-15         Occupational Therapy Evaluation Charge Determination   History Examination Decision-Making   LOW Complexity : Brief history review  LOW Complexity : 1-3 performance deficits relating to physical, cognitive , or psychosocial skils that result in activity limitations and / or participation restrictions  LOW Complexity : No comorbidities that affect functional and no verbal or physical assistance needed to complete eval tasks       Based on the above components, the patient evaluation is determined to be of the following complexity level: LOW   Pain Rating:  None    Activity Tolerance:   Good  Please refer to the flowsheet for vital signs taken during this treatment. After treatment patient left in no apparent distress:    Sitting in chair and Call bell within reach    COMMUNICATION/EDUCATION:   The patients plan of care was discussed with: Physical therapist and Registered nurse.      Thank you for this referral.  SANDRA Argueta, OTR/L  Time Calculation: 26 mins

## 2020-03-14 NOTE — ROUTINE PROCESS
Bedside shift change report given to Dian Cervantes RN (oncoming nurse) by Lizette Cuba RN (offgoing nurse). Report included the following information SBAR, Kardex, Intake/Output, MAR, Recent Results, Cardiac Rhythm SR and Dual Neuro Assessment.

## 2020-03-14 NOTE — CONSULTS
NEUROLOGY CONSULT NOTE    Name Rai Johnson Age 62 y.o. MRN [id number]  1965     Consulting Physician: Pastora Castillo MD      Chief Complaint:  Stroke, vision deficit     Assessment:     Active Problems:    CVA (cerebral vascular accident) [facility]) (3/14/2020)      62year old RHF with a h/o seizure d/o since childhood on PHT, thrombocytosis presenting with subacute difficulties with visual processing, cognitive impairment, slurred speech, dizziness, headaches x 2-3 weeks. MRI Brain this admission reveals restricted diffusion and enhancement of the R parieto-occipital and R frontal territories with rather confluent T2 hyperintensity in a similar distribution on FLAIR. CTA H/N did not reveal any significant stenosis of LVO. Labs are notable for thrombocytosis (724). Differential for above presentation includes subacute infarction vs neoplasm vs inflammatory/less likely infectious etiology given atypical appearance on imaging. She will require repeat neuroimaging in 4 weeks for reassessment to ensure resolution of enhancement. Ischemic risk factors include h/o thrombocytosis. Recommendations:   Hematology consultation for evaluation of thrombocytosis  Advise repeat MRI Brain WWO 4 weeks to assess for interval resolution of enhancement, exclude alternative etiology/neoplasm  Continue ASA/statin therapy as prescribed  No driving due to visual deficits and advise not returning to work until reassessment at follow up 4 weeks due to cognitive concerns. Please call if further questions/concerns. Thank you very much for this referral. I appreciate the opportunity to participate in this patient's care. History of Present Illness: This is a 62 y.o.  right handed  female, we were asked to see for stroke, vision deficit. PMH notable for seizure d/o since childhood on PHT, thrombocytosis. She is admitted due to recent abnormal MRI findings.   Imaging was ordered by her PCP after patient reported difficulty with new onset headaches x 3 weeks, slurred speech, difficulty with reading/visual perception. According to the patient, she noted bi-frontal non-migrainous headaches over the past 3 weeks, slurred speech 1 week ago, difficulty processing words on her computer screen and reading after returning to work this past Monday (was out following cervical decompression). She also noted difficulty balancing her checkbook for the past couple weeks. She endorses some blurred vision and noted an episode of dizziness/vertigofollowing her surgery several weeks ago without headaches. She reports a h/o seizures since age 1 presenting as visual blindness bilaterally, preserved consciousness on PHT. She denies any recent typical seizure activity. MRI Brain this admission reveals restricted diffusion and enhancement of the R parieto-occipital and R frontal territories with rather confluent T2 hyperintensity in a similar distribution on FLAIR. CTA H/N did not reveal any significant stenosis of LVO. Labs are notable for thrombocytosis (724), . No Known Allergies     Prior to Admission medications    Medication Sig Start Date End Date Taking? Authorizing Provider   phenytoin ER (Dilantin Extended) 100 mg ER capsule Take 300 mg by mouth Daily (before breakfast). Take with Dilantin 30 mg capsule for total daily dose of 330 mg every morning   Yes Provider, Historical   phenytoin ER (Dilantin) 30 mg ER capsule Take 30 mg by mouth Daily (before breakfast).  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    Seizures ([facility] [address]     since age 1 last seizure 2006; on dilantin (drug level checked annually by PCP)    Thrombocytosis ([facility] [address] 2019        Past Surgical History:   Procedure Laterality Date    HX BREAST BIOPSY Left     NEG    HX  SECTION      x 2    HX HEENT      LASIK    HX KNEE ARTHROSCOPY Bilateral     HX KNEE REPLACEMENT Right 2018    HX ORTHOPAEDIC Left     TKR    HX ORTHOPAEDIC Left     [address]    HX ORTHOPAEDIC Right     RTH    HX OTHER SURGICAL      BIOPSY LEFT LOWER LEG(AGE 23)    HX TONSILLECTOMY      HX TUBAL LIGATION          Social History     Tobacco Use    Smoking status: Never Smoker    Smokeless tobacco: Never Used   Substance Use Topics    Alcohol use: Yes     Comment: <1/wk        Family History   Problem Relation Age of Onset    Cancer Mother 48        breast cancer    Diabetes Mother     Anesth Problems Mother         PONV    Heart Disease Father     Hypertension Father     No Known Problems Brother         Review of Systems:   Comprehensive review of systems performed and negative except for as listed above. Exam:     Visit Vitals  /72 (BP 1 Location: Left arm, BP Patient Position: At rest)   Pulse 62   Temp 97.9 °F (36.6 °C)   Resp 13   Ht 5' 8\" (1.727 m)   Wt 137.9 kg (304 lb)   SpO2 98%   BMI 46.22 kg/m²        General: Well developed, well nourished. Patient in no apparent distress   Head: Normocephalic, atraumatic, anicteric sclera   Lungs:  Clear to auscultation bilaterally, No wheezes or rubs   Cardiac: Regular rate and rhythm with no murmurs. Abd: Bowel sounds were audible. No tenderness on palpation   Ext: No pedal edema   Skin: No overt signs of rash     Neurological Exam:  Mental Status: Alert and oriented to person place, month/year not date. Cannot recall president. Difficulty with serial 7's. Cannot tell time on a clock. Speech: Fluent no aphasia or dysarthria. Cranial Nerves:   Intact visual fields. Facial sensation is normal. Facial movement is symmetric. Palate is midline. Normal sternocleidomastoid strength. Tongue is midline. Hearing is intact bilaterally. Eyes: PERRL, EOM's full, no nystagmus, no ptosis. Motor:  Full and symmetric strength of upper and lower proximal and distal muscles. Normal bulk and tone. Reflexes:   Deep tendon reflexes 2+/4 and symmetrical.  Plantar response is downgoing b/l. Sensory:   Symmetrically intact  with no perceived deficits modalities involving small or large fibers. Gait:  Gait is balanced   Tremor:   No tremor noted. Cerebellar:  Finger to nose and heel over shin to knee was demonstrated competently. Neurovascular: No carotid bruits. Imaging  CT Results (maximum last 3): Results from [place] encounter on 03/13/20   CTA HEAD    Narrative *PRELIMINARY REPORT*    CTA examination of the head and neck demonstrates no stenosis, thrombosis,  aneurysm, nor dissection. Preliminary report was provided by Dr. Jose Ervin, the on-call radiologist, at  12:29 AM    Final report to follow. *END PRELIMINARY REPORT*    CLINICAL HISTORY: Transient ischemic attack    EXAMINATION:  CT ANGIOGRAPHY HEAD AND NECK    COMPARISON: March 12, 2020    TECHNIQUE:  Following the uneventful administration of iodinated contrast  material, axial CT angiography of the head and neck was performed. Delayed axial  images through the head were also obtained. Coronal and sagittal reconstructions  were obtained. Manual postprocessing of images was performed. 3-D  Sagittal  maximal intensity projection images were obtained. 3-D Coronal maximal  intensity projections were obtained. CT dose reduction was achieved through use  of a standardized protocol tailored for this examination and automatic exposure  control for dose modulation. Adaptive statistical iterative reconstruction  (ASIR) was utilized. FINDINGS:    Delayed contrast-enhanced head CT:    There is an area of hypodensity in the right parieto-occipital region  corresponding to the same area seen on recent head CT.  No evidence of  hemorrhage. Basal cisterns are clear. Paranasal sinuses are clear. No midline  shift. CTA NECK:    Great vessels: Two-vessel aortic arch with patent origins. Right subclavian artery: Patent    Left subclavian artery: Patent     Right common carotid artery: Patent     Left common carotid artery: Patent     Cervical right internal carotid artery: Patent with no significant stenosis by  NASCET criteria. Cervical left internal carotid artery: Patent with no significant stenosis by  NASCET criteria. Right vertebral artery: Patent    Left vertebral artery: Patent    The lung apices are clear. The thyroid is homogeneous. No cervical  lymphadenopathy. CTA HEAD:    Right cavernous internal carotid artery: Patent    Left cavernous internal carotid artery: Patent    Anterior cerebral arteries: Patent    Anterior communicating artery: Patent    Right middle cerebral artery: Patent    Left middle cerebral artery: Patent    Posterior communicating arteries: Patent    Posterior cerebral arteries: Fetal right PCA. Basilar artery: Patent    Distal vertebral arteries: Patent    No evidence for intracranial aneurysm or hemodynamically significant stenosis. Impression IMPRESSION:  No evidence of large vessel occlusion or hemodynamically significant stenosis. CTA NECK    Narrative *PRELIMINARY REPORT*    CTA examination of the head and neck demonstrates no stenosis, thrombosis,  aneurysm, nor dissection. Preliminary report was provided by Dr. Meagan Orr, the on-call radiologist, at  12:29 AM    Final report to follow. *END PRELIMINARY REPORT*    CLINICAL HISTORY: Transient ischemic attack    EXAMINATION:  CT ANGIOGRAPHY HEAD AND NECK    COMPARISON: March 12, 2020    TECHNIQUE:  Following the uneventful administration of iodinated contrast  material, axial CT angiography of the head and neck was performed. Delayed axial  images through the head were also obtained.  Coronal and sagittal reconstructions  were obtained. Manual postprocessing of images was performed. 3-D  Sagittal  maximal intensity projection images were obtained. 3-D Coronal maximal  intensity projections were obtained. CT dose reduction was achieved through use  of a standardized protocol tailored for this examination and automatic exposure  control for dose modulation. Adaptive statistical iterative reconstruction  (ASIR) was utilized. FINDINGS:    Delayed contrast-enhanced head CT:    There is an area of hypodensity in the right parieto-occipital region  corresponding to the same area seen on recent head CT. No evidence of  hemorrhage. Basal cisterns are clear. Paranasal sinuses are clear. No midline  shift. CTA NECK:    Great vessels: Two-vessel aortic arch with patent origins. Right subclavian artery: Patent    Left subclavian artery: Patent     Right common carotid artery: Patent     Left common carotid artery: Patent     Cervical right internal carotid artery: Patent with no significant stenosis by  NASCET criteria. Cervical left internal carotid artery: Patent with no significant stenosis by  NASCET criteria. Right vertebral artery: Patent    Left vertebral artery: Patent    The lung apices are clear. The thyroid is homogeneous. No cervical  lymphadenopathy. CTA HEAD:    Right cavernous internal carotid artery: Patent    Left cavernous internal carotid artery: Patent    Anterior cerebral arteries: Patent    Anterior communicating artery: Patent    Right middle cerebral artery: Patent    Left middle cerebral artery: Patent    Posterior communicating arteries: Patent    Posterior cerebral arteries: Fetal right PCA. Basilar artery: Patent    Distal vertebral arteries: Patent    No evidence for intracranial aneurysm or hemodynamically significant stenosis. Impression IMPRESSION:  No evidence of large vessel occlusion or hemodynamically significant stenosis.    CT HEAD WO CONT    Narrative EXAM: CT HEAD WO CONT    INDICATION: CVA on MRI    COMPARISON: None. CONTRAST: None. TECHNIQUE: Unenhanced CT of the head was performed using 5 mm images. Brain and  bone windows were generated. CT dose reduction was achieved through use of a  standardized protocol tailored for this examination and automatic exposure  control for dose modulation. FINDINGS:  The ventricles and sulci are normal in size, shape and configuration and  midline. There is no significant white matter disease. There is no intracranial  hemorrhage, extra-axial collection, mass, mass effect or midline shift. The  basilar cisterns are open. There is right parieto-occipital hypoattenuation. The  bone windows demonstrate no abnormalities. The visualized portions of the  paranasal sinuses and mastoid air cells are clear. Impression IMPRESSION: Right parietooccipital ischemia. MRI Results (maximum last 3): Results from [place] encounter on 03/13/20   MRI CERV SPINE W WO CONT    Narrative EXAM:  MRI CERV SPINE W WO CONT    INDICATION:   recent neck surgery    COMPARISON: MRI cervical spine 11/5/2019. TECHNIQUE: Multiplanar multisequence acquisition without and with contrast of  the cervical spine. CONTRAST: 20 cc Dotarem. FINDINGS:  Postsurgical changes of C5-C6 ACDF. There is no evidence of abnormal enhancement  or other postoperative complication. There is normal alignment of the cervical  spine. Vertebral body heights are maintained without evidence of acute fracture. Marrow signal is normal. Degenerative changes as detailed below. The cervical  cord is normal in size and signal. Partially visualized intrinsic T1 shortening  and enhancement involving the right occipital lobe. Visualized soft tissues are  unremarkable. C2-C3: Severe right facet arthropathy. No significant disc herniation, spinal  canal or neural foraminal stenosis. C3-C4: Small central disc osteophyte protrusion.  No significant spinal canal or  neural foraminal stenosis. C4-C5: No significant disc herniation, spinal canal or neural foraminal  stenosis. C5-C6: Status post ACDF. No significant spinal canal or neural foraminal  stenosis. C6-C7: No significant disc herniation, spinal canal or neural foraminal  stenosis. C7-T1: Mild right facet arthropathy. No significant disc herniation, spinal  canal or neural foraminal stenosis. Impression IMPRESSION:    1. Postsurgical changes of C5-C6 ACDF without complication. No acute abnormality  in the cervical spine. Mild degenerative disease as above. 2. Partially visualized intrinsic T1 shortening and enhancement within the right  occipital lobe, previously characterized as a subacute infarct. Follow-up brain  MRI in 4-6 weeks is recommended to ensure expected resolution of enhancement  given somewhat unusual appearance. 23X           Results from [place] encounter on 03/12/20   MRI BRAIN W WO CONT    Addendum Addendum:  Findings called to the office by me [phone] hours. PLEASE SEE ORIGINAL REPORT BELOW:      Anabel Salvador MD 3/13/2020  8:56 AM          Narrative EXAM:  MRI BRAIN W WO CONT  INDICATION:  Apraxia, R 48.2, bilateral tinnitus, H 93.12, dizziness, R 42,  headache, R 51, confusion,  TECHNIQUE:  Whole head sagittal T1, axial T2, T1, T2*and FLAIR images followed by thin 2 mm  axial T1-weighted images of the temporal bone and posterior fossa, then  intravenous infusion 10 mL Dotarem with repeat and axial and coronal T1-weighted  images. Axial diffusion weighted images and axial T2 cisternogram images were  obtained. Postgadolinium whole head T1. Postcontrast T1 volume acquisition with  thin sagittal axial and coronal reconstructed T1-weighted images  COMPARISON: MRI cervical spine  FINDINGS:  There is an approximately 6 x 5 x 2.5 cm area of abnormal signal and enhancement  involving the right lateral occipital and parietal lobes.  Gyral T1  hyperintensity and heterogeneous and gyral enhancement along with T2  hyperintensity has the appearance of subacute infarction. There is also a focus  of involvement in the right anterior superior lateral frontal cortex. Patient is  noted to have fetal origin to the right posterior cerebral artery. Distribution  is consistent with an embolus to the right internal carotid artery. Flow voids  are present in vertebral basilar and carotid artery systems. Ventricular size  and configuration are normal.  T1 hyperintensity consistent with subacute infarction, otherwise no evidence of  intracranial hemorrhage. No evidence of intracranial mass or abnormal  extra-axial fluid collections. The cerebellopontine angle, internal auditory canals and temporal bones are  normal.   The craniocervical junction is unremarkable. Mildly enlarged partial empty sella incidentally noted. Paranasal sinuses  temporal bones and orbits are unremarkable. Impression IMPRESSION:   1. Subacute infarction to the right lateral occipital and parietal lobes with a  smaller area involving right frontal lobe. This is likely occurred within the  last couple weeks. Correlate clinically. 2. No abnormality demonstrated in the CP angle, IACs or temporal bones. 23X          Results from [place] encounter on 11/05/19   MRI CERV SPINE WO CONT    Narrative EXAM: MRI CERV SPINE WO CONT  Clinical history: Cervical radiculopathy  INDICATION: . Radiculopathy, cervical region    COMPARISON: None    TECHNIQUE: MR imaging of the cervical spine was performed using the following  sequences: sagittal T1, T2, STIR;  axial T2, T1.     CONTRAST:  None. FINDINGS:    There is congenital narrowing cervical canal. There is moderate cord compression  at C5-6. No cord myelomalacia or significant cord edema. Vertebral body heights  are maintained. Marrow signal is normal.    The craniocervical junction is intact. No Chiari or syrinx.       The paraspinal soft tissues are within normal limits. C2-C3: No herniation or stenosis. C3-C4: Disc bulge. Minimal canal stenosis. Mild facet arthropathy. C4-C5: Facet arthropathy. Disc bulge/osteophyte effacement of the right. Mild  canal stenosis. Foramina are patent. C5-C6: Moderate to large central, left paracentral disc protrusion with  superimposed cranially oriented disc extrusion. Extrusion measures 20 x 5 mm in  AP dimension. There is severe canal stenosis present. Moderate cord compression  at C5-6. Foramina are patent    C6-C7: Facet arthropathy is mild bilaterally. Canal is patent. Foramina are  patent. C7-T1: No herniation or stenosis. Impression IMPRESSION:  Disc degenerative changes most pronounced at C5-6. There is a moderate cranially  oriented disc extrusion at C5-6. There is severe canal stenosis at C5-6 with  mild to moderate cord compression at C5-6. No significant cord edema or cord  myelomalacia is identified. Other less severe degenerative findings are as described above. 23X       Lab Review  Lab Results   Component Value Date/Time    WBC 5.8 03/14/2020 01:43 AM    HCT 36.6 03/14/2020 01:43 AM    HGB 11.6 03/14/2020 01:43 AM    PLATELET 286 (H) 81/35/5412 01:43 AM     Lab Results   Component Value Date/Time    Sodium 141 03/13/2020 12:00 PM    Potassium 3.9 03/13/2020 12:00 PM    Chloride 107 03/13/2020 12:00 PM    CO2 30 03/13/2020 12:00 PM    Glucose 90 03/13/2020 12:00 PM    BUN 15 03/13/2020 12:00 PM    Creatinine 0.75 03/13/2020 12:00 PM    Calcium 9.2 03/13/2020 12:00 PM     No components found for: TROPQUANT  No results found for: EMERSON    Signed:  Laura Gilmore.  Lois Williamson, DO  3/14/2020  2:41 PM

## 2020-03-14 NOTE — PROGRESS NOTES
Problem: Mobility Impaired (Adult and Pediatric)  Goal: *Acute Goals and Plan of Care (Insert Text)  Description: FUNCTIONAL STATUS PRIOR TO ADMISSION: Patient was independent and active without use of DME.    HOME SUPPORT PRIOR TO ADMISSION: The patient lived with spouse but did not require assist.    Physical Therapy Goals  Initiated 3/14/2020  1. Patient will move from supine to sit and sit to supine  in bed with independence within 7 day(s). 2.  Patient will transfer from bed to chair and chair to bed with independence using the least restrictive device within 7 day(s). 3.  Patient will perform sit to stand with independence within 7 day(s). 4.  Patient will ambulate with independence for 250 feet with the least restrictive device within 7 day(s). 5.  Patient will ascend/descend 5 stairs with 1 handrail(s) with independence within 7 day(s). Outcome: Progressing Towards Goal   PHYSICAL THERAPY EVALUATION  Patient: Evelyn Byrd (58 y.o. female)  Date: 3/14/2020  Primary Diagnosis: TIA (transient ischemic attack) [G45.9]  CVA (cerebral vascular accident) ([facility] [address] [I63.9]        Precautions: modified contact precautions         ASSESSMENT  Based on the objective data described below, the patient presents with decreased visual processing(unable to read clock), occasional word finding difficulty, and decreased high level balance. Pt reports she recently returned to work however she was unable to [place] the computer and called her  to pick her up. Pt also noted she did have some slurred speech previously. Pt demonstrates transfers with supervision and ambulation with good balance. She scored 53 out of a 56 possible points on the Peng balance assessment. Pt was unable to stand on one foot however reports she has not been able to do this for a long time. She is likely near her baseline level for mobility, balance, and gait. Pt is cleared for discharge from a PT standpoint.   Will follow up for one additional visit on Monday. Current Level of Function Impacting Discharge (mobility/balance): ambulation with CGA    Functional Outcome Measure: The patient scored 53/56 on the Peng outcome measure which is indicative of low fall risk. Other factors to consider for discharge: Recommend OT/Speech in outpatient clinic, pt reports she is unable to work with new deficits     Patient will benefit from skilled therapy intervention to address the above noted impairments. PLAN :  Recommendations and Planned Interventions: gait training and neuromuscular re-education      Frequency/Duration: Patient will be followed by physical therapy:  5 times a week to address goals. Recommendation for discharge: (in order for the patient to meet his/her long term goals)  No skilled physical therapy/ follow up rehabilitation needs identified at this time. This discharge recommendation:  Has not yet been discussed the attending provider and/or case management    IF patient discharges home will need the following DME: none         SUBJECTIVE:   Patient stated I have never been able to do this(stand on one foot).     OBJECTIVE DATA SUMMARY:   HISTORY:    Past Medical History:   Diagnosis Date    Arthritis     Chronic pain     bilateral hips and right knee    Morbid obesity with BMI of 45.0-49.9, adult ([address] 2016    Motion sickness     when not eating    MRSA carrier 2019    Seizures ([address]     since age 1 last seizure 2006; on dilantin (drug level checked annually by PCP)    Thrombocytosis ([address] 2019     Past Surgical History:   Procedure Laterality Date    HX BREAST BIOPSY Left     NEG    HX  SECTION      x 2    HX HEENT      LASIK    HX KNEE ARTHROSCOPY Bilateral     HX KNEE REPLACEMENT Right 2018    HX ORTHOPAEDIC Left     TKR    HX ORTHOPAEDIC Left     [address]    HX ORTHOPAEDIC Right     RTH    HX OTHER SURGICAL      BIOPSY LEFT LOWER LEG(AGE 23)    HX TONSILLECTOMY      HX TUBAL LIGATION         Personal factors and/or comorbidities impacting plan of care: Pt reports she is unable to work with new deficits     Home Situation  Home Environment: Private residence  # Steps to Enter: 5  Rails to Enter: Yes  Hand Rails : Right  One/Two Story Residence: Two story, live on 1st floor  Living Alone: No  Support Systems: Spouse/Significant Other/Partner, Family member(s)  Patient Expects to be Discharged to[de-identified] Private residence  Current DME Used/Available at Home: [place]lyn [place], straight, Walker, rolling, Raised toilet seat, Shower chair  Tub or Shower Type: Tub/Shower combination    EXAMINATION/PRESENTATION/DECISION MAKING:   Critical Behavior:  Neurologic State: Alert  Orientation Level: Oriented X4  Cognition: Appropriate for age attention/concentration, Follows commands  Safety/Judgement: Decreased awareness of environment, Fall prevention  Hearing: Auditory  Auditory Impairment: None  Skin:  intact    Range Of Motion:  AROM: Within functional limits           PROM: Within functional limits           Strength:    Strength:  Within functional limits                    Tone & Sensation:   Tone: Normal              Sensation: Intact               Coordination:  Coordination: Within functional limits  Vision:   Tracking: Able to track stimulus in all quadrants w/o difficulty  Diplopia: No  Acuity: (decreased, jhoan with visual comprehension)  Corrective Lenses: Reading glasses  Functional Mobility:  Bed Mobility:   Not assessed, OOB in chair            Transfers:  Sit to Stand: Supervision  Stand to Sit: Supervision                       Balance:   Sitting: Intact  Standing: Intact  Ambulation/Gait Training:  Distance (ft): 200 Feet (ft)  Assistive Device: Gait belt  Ambulation - Level of Assistance:CGA                 Base of Support: Widened                   Functional Measure:  Peng Balance Test:    Sitting to Standin  Standing Unsupported: 4  Sitting with Back Unsupported: 4  Standing to Sitting: 4  Transfers: 4  Standing Unsupported with Eyes Closed: 4  Standing Unsupported with Feet Together: 4  Reach Forward with Outstretched Arm: 4   Object: 4  Turn to Look Over Shoulders: 4  Turn 360 Degrees: 4  Alternate Foot on Step/Stool: 4  Standing Unsupported One Foot in Front: 4  Stand on One Le  Total: 53/56         56=Maximum possible score;   0-20=High fall risk  21-40=Moderate fall risk   41-56=Low fall risk               Physical Therapy Evaluation Charge Determination   History Examination Presentation Decision-Making   MEDIUM  Complexity : 1-2 comorbidities / personal factors will impact the outcome/ POC  LOW Complexity : 1-2 Standardized tests and measures addressing body structure, function, activity limitation and / or participation in recreation  LOW Complexity : Stable, uncomplicated  LOW Complexity : FOTO score of       Based on the above components, the patient evaluation is determined to be of the following complexity level: LOW     Pain Rating:  Verbal: none    Activity Tolerance:   Good    After treatment patient left in no apparent distress:   Sitting in chair and Call bell within reach    COMMUNICATION/EDUCATION:   The patients plan of care was discussed with: Registered nurse. Fall prevention education was provided and the patient/caregiver indicated understanding. and Patient/family agree to work toward stated goals and plan of care.     Thank you for this referral.  Valentine Altamirano   Time Calculation: 36 mins

## 2020-03-14 NOTE — PROGRESS NOTES
Transition of Care Plan     Disposition: Home with family assistance once medically stable   RUR-  n/a   Transport: , [address]    Follow up: PCP/Specialist(s)    [facility] declined      Reason for Admission:  CVA                    RUR Score:  n/a                   Plan for utilizing home health:  Pending care provider recommendation        PCP: First and Last name:  Iesha Ruano   Name of Practice: [address] [place] [address]   Are you a current patient: Yes/No:  Yes   Approximate date of last visit:                     Current Advanced Directive/Advance Care Plan: Patient does not have an [address] Transition of Care Plan:       CM met with patient at bedside, introduced role, verified demographics, and offered assistance. Patient lives with her , Zenia Vickers [phone] in a two story home with a few steps to enter. Patient was A&Ox4 and was tearful as she stated she is not able to currently work and is concerned about finances. Patient stated that she was already using her short term disability for a recent back surgery and has exhausted all of that time. Patient stated she has had [address] in the past for Therapy but was unable to recall the name of the agency she was utilizing. Patient stated she was scheduled a new patient apt with a Neurologist for next week but does not recall who the physician was and that she will probably not keep it as she is awaiting the Neurologist here at the hospital today. Patient stated she was able to complete her ADL's/IADL's and did not use any DME at this time. Patient uses [facility] on [id number] [facility], [facility] [place] [phone]. CM did not provide patient with Observation letter as patient was tearful and stressing about finances, CM will attempt to discuss Observation status with patient later today.      Care Management Interventions  PCP Verified by CM: Yes  Palliative Care Criteria Met (RRAT>21 & CHF Dx)?: No  Mode of Transport at Discharge:  Other (see comment)(, Zenia Vickers)  Transition of Care Consult (CM Consult): Discharge Planning  MyChart Signup: No  Discharge Durable Medical Equipment: No  Health Maintenance Reviewed: Yes  Physical Therapy Consult: Yes  Occupational Therapy Consult: Yes  Speech Therapy Consult: No  Current Support Network: Lives with Spouse  Confirm Follow Up Transport: Family  The Patient and/or Patient Representative was Provided with a Choice of Provider and Agrees with the Discharge Plan?: Yes   Resource Information Provided?: No  Discharge Location  Discharge Placement: Home with family assistance                  Robert Suresh, [address]

## 2020-03-14 NOTE — PROGRESS NOTES
Hospitalist Progress Note      Hospital summary: The patient is a 15-year-old female with past medical history of arthritis, chronic pain, morbid obesity, motion sickness, MRSA carrier, seizures, on Dilantin, and thrombocytosis, who presents to the hospital with blurry vision. The patient reports her symptoms started about 3 weeks back. She started having some headache associated with intermittent vertigo-type symptoms and slurred speech. The patient reports she felt dizzy, felt as if she could not concentrate on the vision, went to her office this Monday and was not able to see the computer screen. The patient reports she went to her primary care physician, had an MRI they ordered. MRI showed subacute stroke in the occipital lobe, and she was referred to the ER for further management and evaluation. The patient reports that her symptoms basically started about 2 months back. Around Thanksgiving, she had a cervical decompression done because of paresthesias of both the arm on emergent basis. Since then, she has had continued paresthesia. 3/13/2020      Assessment/Plan:  Acute CVA  - MRI : Subacute infarction to the right lateral occipital and parietal lobes with a smaller area involving right frontal lobe  - CT  Head: Right parietooccipital ischemia  - CTA head: No evidence of large vessel occlusion or hemodynamically significant stenosis.   - Echo: normal EF  - , A1c 5.4  - Neuorlogy on board  - c/w aspirin 325mg and lipitor 80mg  - need outpt vision OT  - recommend repeat MRI Brain WWO 4 weeks to assess for interval resolution of enhancement, exclude alternative etiology/neoplasm  - No driving due to visual deficits and advise not returning to work until reassessment at follow up 4 weeks [facility] neurology due to cognitive concerns    Thrombocytosis:   - appears chronic but worsening  -  Hematology consult pending     Recent cervical spinal surgery:    - MRI of the spine: Postsurgical changes of C5-C6 ACDF without complication. No acute abnormality  in the cervical spine. Hx seizures - c/w phenytoin     Code status: Full  DVT prophylaxis: Heparin   Disposition: TBD. Home likely tomorrow   ----------------------------------------------    CC: Blurry vision    S: Patient is seen and examined at bedside. She still has blurry vision. Explained about MRI report of stroke. She is worried and tearful that she can not go to work ( she is an ). Reassured her and stated that she will definitely will qualify for temporary disability. She states that she does not felt better since her spine surgery last year. Review of Systems:  A comprehensive review of systems was negative.     O:  Visit Vitals  /72 (BP 1 Location: Left arm, BP Patient Position: At rest)   Pulse 62   Temp 97.9 °F (36.6 °C)   Resp 13   Ht 5' 8\" (1.727 m)   Wt 137.9 kg (304 lb)   SpO2 98%   BMI 46.22 kg/m²       PHYSICAL EXAM:  Gen: NAD  HEENT: anicteric sclerae, normal conjunctiva, oropharynx clear, MM moist  Neck: supple, trachea midline, no adenopathy  Heart: RRR, no MRG, no JVD, no peripheral edema  Lungs: CTA b/l, non-labored respirations  Abd: soft, NT, ND, BS+, no organomegaly  Extr: warm  Skin: dry, no rash  Neuro: vision decreased, normal speech, moves all extremities  Psych: anxious and upset      Intake/Output Summary (Last 24 hours) at 3/14/2020 1552  Last data filed at 3/14/2020 0400  Gross per 24 hour   Intake 518.33 ml   Output --   Net 518.33 ml        Recent labs & imaging reviewed:  Recent Results (from the past 24 hour(s))   CULTURE, MRSA    Collection Time: 03/13/20  7:15 PM   Result Value Ref Range    Special Requests: NO SPECIAL REQUESTS      Culture result: MRSA NOT PRESENT AT 18 HOURS     GLUCOSE, POC    Collection Time: 03/13/20  9:12 PM   Result Value Ref Range    Glucose (POC) 90 65 - 100 mg/dL    Performed by Camilo Mcleod    LIPID PANEL    Collection Time: 03/14/20  1:43 AM   Result Value Ref Range    LIPID PROFILE          Cholesterol, total 201 (H) <200 MG/DL    Triglyceride 103 <150 MG/DL    HDL Cholesterol 80 MG/DL    LDL, calculated 100.4 (H) 0 - 100 MG/DL    VLDL, calculated 20.6 MG/DL    CHOL/HDL Ratio 2.5 0.0 - 5.0     HEMOGLOBIN A1C WITH EAG    Collection Time: 03/14/20  1:43 AM   Result Value Ref Range    Hemoglobin A1c 5.4 4.0 - 5.6 %    Est. average glucose 108 mg/dL   CBC W/O DIFF    Collection Time: 03/14/20  1:43 AM   Result Value Ref Range    WBC 5.8 3.6 - 11.0 K/uL    RBC 3.59 (L) 3.80 - 5.20 M/uL    HGB 11.6 11.5 - 16.0 g/dL    HCT 36.6 35.0 - 47.0 %    .9 (H) 80.0 - 99.0 FL    MCH 32.3 26.0 - 34.0 PG    MCHC 31.7 30.0 - 36.5 g/dL    RDW 14.6 (H) 11.5 - 14.5 %    PLATELET 840 (H) 069 - 400 K/uL    MPV 11.8 8.9 - 12.9 FL    NRBC 0.0 0  WBC    ABSOLUTE NRBC 0.00 0.00 - 0.01 K/uL   GLUCOSE, POC    Collection Time: 03/14/20  7:58 AM   Result Value Ref Range    Glucose (POC) 97 65 - 100 mg/dL    Performed by Dipak Connell    ECHO ADULT COMPLETE    Collection Time: 03/14/20 12:10 PM   Result Value Ref Range    LA Volume 102.00 22 - 52 mL    LV E' Lateral Velocity 7.55 cm/s    LV E' Septal Velocity 7.90 cm/s    Tapse 1.94 1.5 - 2.0 cm    Ao Root D 3.20 cm    Aortic Valve Systolic Peak Velocity 984.59 cm/s    Aortic Valve Area by Continuity of Peak Velocity 2.1 cm2    AoV PG 10.9 mmHg    LVIDd 5.65 (A) 3.9 - 5.3 cm    LVPWd 0.93 (A) 0.6 - 0.9 cm    LVIDs 3.78 cm    IVSd 0.85 0.6 - 0.9 cm    LVOT d 2.05 cm    LVOT Peak Velocity 106.08 cm/s    LVOT Peak Gradient 4.5 mmHg    MVA (PHT) 3.8 cm2    MV A Bryce 74.71 cm/s    MV E Bryce 91.85 cm/s    MV E/A 1.23     Left Atrium to Aortic Root Ratio 1.48     RVIDd 4.22 cm    LA Vol 4C 88.43 (A) 22 - 52 mL    LA Vol 2C 111.17 (A) 22 - 52 mL    LA Area 4C 27.8 cm2    LV Mass .8 (A) 67 - 162 g    LV Mass AL Index 92.1 43 - 95 g/m2    E/E' lateral 12.17     E/E' septal 11.63     RVSP 35.8 mmHg    E/E' ratio (averaged) 11.90     Est. RA Pressure 3.0 mmHg    Mitral Valve E Wave Deceleration Time 198.0 ms    Mitral Valve Pressure Half-time 57.4 ms    Left Atrium Major Axis 4.72 cm    Triscuspid Valve Regurgitation Peak Gradient 32.8 mmHg    Pulmonic Valve Max Velocity 74.19 cm/s    TR Max Velocity 286.36 cm/s    LA Vol Index 41.77 16 - 28 ml/m2    PASP 35.8 mmHg    LA Vol Index 45.52 16 - 28 ml/m2    LA Vol Index 36.21 16 - 28 ml/m2    Left Ventricular Fractional Shortening by 2D [id number] %    Mitral Valve Deceleration [place] [id number]     AV Velocity Ratio 0.64     PV peak gradient 2.2 mmHg     Recent Labs     03/14/20  0143 03/13/20  1200   WBC 5.8 6.2   HGB 11.6 13.0   HCT 36.6 41.5   * 913*     Recent Labs     03/13/20  1200      K 3.9      CO2 30   BUN 15   CREA 0.75   GLU 90   CA 9.2     Recent Labs     03/13/20  1200   SGOT 15   ALT 26   AP 87   TBILI 0.2   TP 7.9   ALB 4.2   GLOB 3.7     No results for input(s): INR, PTP, APTT, INREXT in the last 72 hours. No results for input(s): FE, TIBC, PSAT, FERR in the last 72 hours. No results found for: FOL, RBCF   No results for input(s): PH, PCO2, PO2 in the last 72 hours. No results for input(s): CPK, CKNDX, TROIQ in the last 72 hours.     No lab exists for component: CPKMB  Lab Results   Component Value Date/Time    Cholesterol, total 201 (H) 03/14/2020 01:43 AM    HDL Cholesterol 80 03/14/2020 01:43 AM    LDL, calculated 100.4 (H) 03/14/2020 01:43 AM    Triglyceride 103 03/14/2020 01:43 AM    CHOL/HDL Ratio 2.5 03/14/2020 01:43 AM     Lab Results   Component Value Date/Time    Glucose (POC) 97 03/14/2020 07:58 AM    Glucose (POC) 90 03/13/2020 09:12 PM    Glucose (POC) 125 (H) 09/21/2019 11:16 AM    Glucose (POC) 117 (H) 09/19/2019 10:35 PM    Glucose (POC) 93 09/18/2019 02:22 PM     Lab Results   Component Value Date/Time    Color YELLOW/STRAW 03/13/2020 12:00 PM    Appearance CLOUDY (A) 03/13/2020 12:00 PM    Specific gravity 1.017 03/13/2020 12:00 PM    pH (UA) 7.5 03/13/2020 12:00 PM    Protein NEGATIVE  03/13/2020 12:00 PM    Glucose NEGATIVE  03/13/2020 12:00 PM    Ketone NEGATIVE  03/13/2020 12:00 PM    Bilirubin NEGATIVE  03/13/2020 12:00 PM    Urobilinogen 0.2 03/13/2020 12:00 PM    Nitrites NEGATIVE  03/13/2020 12:00 PM    Leukocyte Esterase TRACE (A) 03/13/2020 12:00 PM    Epithelial cells MANY (A) 03/13/2020 12:00 PM    Bacteria 1+ (A) 03/13/2020 12:00 PM    WBC 0-4 03/13/2020 12:00 PM    RBC 0-5 03/13/2020 12:00 PM       Med list reviewed  Current Facility-Administered Medications   Medication Dose Route Frequency    albuterol-ipratropium (DUO-NEB) 2.5 MG-0.5 MG/3 ML  3 mL Nebulization BID RT    phenytoin ER (DILANTIN ER) ER capsule 300 mg  300 mg Oral ACB    phenytoin ER (DILANTIN ER) ER capsule 30 mg  30 mg Oral ACB    acetaminophen (TYLENOL) tablet 650 mg  650 mg Oral Q4H PRN    Or    acetaminophen (TYLENOL) solution 650 mg  650 mg Per NG tube Q4H PRN    Or    acetaminophen (TYLENOL) suppository 650 mg  650 mg Rectal Q4H PRN    aspirin tablet 325 mg  325 mg Oral DAILY    atorvastatin (LIPITOR) tablet 80 mg  80 mg Oral QHS    famotidine (PEPCID) tablet 20 mg  20 mg Oral Q12H    heparin (porcine) injection 5,000 Units  5,000 Units SubCUTAneous Q8H    hydrALAZINE (APRESOLINE) 20 mg/mL injection 10 mg  10 mg IntraVENous Q6H PRN       Care Plan discussed with:  Patient/Family and Nurse    Hayley Moreira MD  Internal Medicine  Date of Service: 3/14/2020

## 2020-03-14 NOTE — PROGRESS NOTES
Problem: Falls - Risk of  Goal: *Absence of Falls  Description: Document Delia Stoddard Fall Risk and appropriate interventions in the flowsheet.   Outcome: Progressing Towards Goal  Note: Fall Risk Interventions:       Mentation Interventions: Adequate sleep, hydration, pain control, More frequent rounding    Medication Interventions: Assess postural VS orthostatic hypotension, Evaluate medications/consider consulting pharmacy, Patient to call before getting OOB, Teach patient to arise slowly, Utilize gait belt for transfers/ambulation    Elimination Interventions: Call light in reach, Patient to call for help with toileting needs, Stay With Me (per policy), Toilet paper/wipes in reach, Toileting schedule/hourly rounds              Problem: Patient Education: Go to Patient Education Activity  Goal: Patient/Family Education  Outcome: Progressing Towards Goal     Problem: TIA/CVA Stroke: 0-24 hours  Goal: Activity/Safety  Outcome: Progressing Towards Goal  Goal: Consults, if ordered  Outcome: Progressing Towards Goal  Goal: Diagnostic Test/Procedures  Outcome: Progressing Towards Goal  Goal: Nutrition/Diet  Outcome: Progressing Towards Goal  Goal: Discharge Planning  Outcome: Progressing Towards Goal  Goal: Medications  Outcome: Progressing Towards Goal  Goal: Respiratory  Outcome: Progressing Towards Goal  Goal: Treatments/Interventions/Procedures  Outcome: Progressing Towards Goal  Goal: Psychosocial  Outcome: Progressing Towards Goal  Goal: *Hemodynamically stable  Outcome: Progressing Towards Goal  Goal: *Neurologically stable  Description: Absence of additional neurological deficits    Outcome: Progressing Towards Goal  Goal: *Verbalizes anxiety and depression are reduced or absent  Outcome: Progressing Towards Goal  Goal: *Absence of Signs of Aspiration on Current Diet  Outcome: Progressing Towards Goal  Goal: *Absence of deep venous thrombosis signs and symptoms(Stroke Metric)  Outcome: Progressing Towards Goal  Goal: *Ability to perform ADLs and demonstrates progressive mobility and function  Outcome: Progressing Towards Goal  Goal: *Stroke education started(Stroke Metric)  Outcome: Progressing Towards Goal  Goal: *Dysphagia screen performed(Stroke Metric)  Outcome: Progressing Towards Goal  Goal: *Rehab consulted(Stroke Metric)  Outcome: Progressing Towards Goal     Problem: Ischemic Stroke: Discharge Outcomes  Goal: *Verbalizes anxiety and depression are reduced or absent  Outcome: Progressing Towards Goal  Goal: *Verbalize understanding of risk factor modification(Stroke Metric)  Outcome: Progressing Towards Goal  Goal: *Hemodynamically stable  Outcome: Progressing Towards Goal  Goal: *Absence of aspiration pneumonia  Outcome: Progressing Towards Goal  Goal: *Aware of needed dietary changes  Outcome: Progressing Towards Goal  Goal: *Verbalize understanding of prescribed medications including anti-coagulants, anti-lipid, and/or anti-platelets(Stroke Metric)  Outcome: Progressing Towards Goal  Goal: *Tolerating diet  Outcome: Progressing Towards Goal  Goal: *Aware of follow-up diagnostics related to anticoagulants  Outcome: Progressing Towards Goal  Goal: *Ability to perform ADLs and demonstrates progressive mobility and function  Outcome: Progressing Towards Goal  Goal: *Absence of DVT(Stroke Metric)  Outcome: Progressing Towards Goal  Goal: *Absence of aspiration  Outcome: Progressing Towards Goal  Goal: *Optimal pain control at patient's stated goal  Outcome: Progressing Towards Goal  Goal: *Home safety concerns addressed  Outcome: Progressing Towards Goal  Goal: *Describes available resources and support systems  Outcome: Progressing Towards Goal  Goal: *Verbalizes understanding of activation of EMS(911) for stroke symptoms(Stroke Metric)  Outcome: Progressing Towards Goal  Goal: *Understands and describes signs and symptoms to report to providers(Stroke Metric)  Outcome: Progressing Towards Goal  Goal: *Neurolgocially stable (absence of additional neurological deficits)  Outcome: Progressing Towards Goal  Goal: *Verbalizes importance of follow-up with primary care physician(Stroke Metric)  Outcome: Progressing Towards Goal  Goal: *Smoking cessation discussed,if applicable(Stroke Metric)  Outcome: Progressing Towards Goal  Goal: *Depression screening completed(Stroke Metric)  Outcome: Progressing Towards Goal     Problem: General Medical Care Plan  Goal: *Vital signs within specified parameters  Outcome: Progressing Towards Goal  Goal: *Labs within defined limits  Outcome: Progressing Towards Goal  Goal: *Absence of infection signs and symptoms  Outcome: Progressing Towards Goal  Goal: *Optimal pain control at patient's stated goal  Outcome: Progressing Towards Goal  Goal: *Skin integrity maintained  Outcome: Progressing Towards Goal  Goal: *Fluid volume balance  Outcome: Progressing Towards Goal  Goal: *Optimize nutritional status  Outcome: Progressing Towards Goal  Goal: *Anxiety reduced or absent  Outcome: Progressing Towards Goal  Goal: *Progressive mobility and function (eg: ADL's)  Outcome: Progressing Towards Goal     Problem: Patient Education: Go to Patient Education Activity  Goal: Patient/Family Education  Outcome: Progressing Towards Goal

## 2020-03-14 NOTE — CONSULTS
[facility]  at [facility] [address], [address] [facility]  [phone]             Patient: Shaji Skinner MRN: [id number]  SSN: [id number]    YOB: 1965  Age: 62 y.o. Sex: female      Subjective:      Shaji Skinner is a 62 y.o. female who I am seeing in consultation for thrombocytosis. She is admitted with subacute difficulties with visual processing, cognitive impairment, slurred speech, dizziness, headaches x 2-3 weeks. MRI Brain this admission reveals restricted diffusion and enhancement of the R parieto-occipital and R frontal territories with rather confluent T2 hyperintensity in a similar distribution on FLAIR. CTA H/N did not reveal any significant stenosis of LVO. Platelet count is high. She is feeling better. She denies excessive weight loss, night sweats or fevers.       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    Seizures ([address]     since age 1 last seizure 2006; on dilantin (drug level checked annually by PCP)    Thrombocytosis ([address] 2019     Past Surgical History:   Procedure Laterality Date    HX BREAST BIOPSY Left     NEG    HX  SECTION      x 2    HX HEENT      LASIK    HX KNEE ARTHROSCOPY Bilateral     HX KNEE REPLACEMENT Right 2018    HX ORTHOPAEDIC Left     TKR    HX ORTHOPAEDIC Left     [address]    HX ORTHOPAEDIC Right     RTH    HX OTHER SURGICAL BIOPSY LEFT LOWER LEG(AGE 23)    HX TONSILLECTOMY      HX TUBAL LIGATION        Family History   Problem Relation Age of Onset   Paul Ha Cancer Mother 48        breast cancer    Diabetes Mother     Anesth Problems Mother         PONV    Heart Disease Father     Hypertension Father     No Known Problems Brother      Social History     Tobacco Use    Smoking status: Never Smoker    Smokeless tobacco: Never Used   Substance Use Topics    Alcohol use: Yes     Comment: <1/wk      Prior to Admission medications    Medication Sig Start Date End Date Taking? Authorizing Provider   phenytoin ER (Dilantin Extended) 100 mg ER capsule Take 300 mg by mouth Daily (before breakfast). Take with Dilantin 30 mg capsule for total daily dose of 330 mg every morning   Yes Provider, Historical   phenytoin ER (Dilantin) 30 mg ER capsule Take 30 mg by mouth Daily (before breakfast). Take with Dilantin 300 mg capsule for total daily dose of 330 mg every morning    Yes Provider, Historical              No Known Allergies        Objective:     Vitals:    03/14/20 0552 03/14/20 1027 03/14/20 1134 03/14/20 1344   BP: 110/65 131/70 110/65 151/72   Pulse: 60 66  62   Resp: 11 12  13   Temp: 97.9 °F (36.6 °C) 98 °F (36.7 °C)  97.9 °F (36.6 °C)   SpO2: 98% 97%  98%   Weight:   304 lb (137.9 kg)    Height:   5' 8\" (1.727 m)             Physical Exam:    GENERAL: alert, cooperative, no distress, appears stated age  EYE: conjunctivae/corneas clear. PERRL, EOM's intact  LYMPHATIC: Cervical, supraclavicular, and axillary nodes normal.   THROAT & NECK: normal and no erythema or exudates noted. LUNG: clear to auscultation bilaterally  HEART: regular rate and rhythm, S1, S2 normal, no murmur, click, rub or gallop  ABDOMEN: soft, non-tender. Bowel sounds normal. No masses,  no organomegaly  EXTREMITIES:  extremities normal, atraumatic, no cyanosis or edema  SKIN: Normal.  NEUROLOGIC: AOx3.  Gait normal. Reflexes and motor strength normal and symmetric. Cranial nerves 2-12 and sensation grossly intact. Lab Results   Component Value Date/Time    WBC 5.8 03/14/2020 01:43 AM    HGB 11.6 03/14/2020 01:43 AM    HCT 36.6 03/14/2020 01:43 AM    PLATELET 396 (H) 46/92/2746 01:43 AM    .9 (H) 03/14/2020 01:43 AM             Assessment:     1. Thrombocytosis    Primary vs secondary  Since the platelet count is going up steadily, this could be primary bone marrow disorder. I will get mutation analysis    If this is ET, this recent CVA could be related. Then she would need Hydrea to control platelet count. Plan:        > Jak2 V617F mutation with reflex testing for Jak2 exon 12 mutation, MPL mutation  > OK for d/c  > Follow up in clinic in 2-3 weeks      Signed by: Char Pizarro MD                     March 14, 2020      CC.  Alena Bess MD

## 2020-03-14 NOTE — PROGRESS NOTES
Bedside and Verbal shift change report given to [address] [place] [address] (oncoming nurse) by Benny Floyd (offgoing nurse). Report included the following information SBAR, Kardex, Intake/Output, MAR, Recent Results, Cardiac Rhythm NSR and Dual Neuro Assessment.

## 2020-03-14 NOTE — H&P
[address]  HISTORY AND PHYSICAL    Name:  Brigitte Estrada  MR#:  [id number]  :  1965  ACCOUNT #:  [de-identified]  ADMIT DATE:  2020    CHIEF COMPLAINT:  Vision problem. HISTORY OF PRESENT ILLNESS:  The patient is a 57-year-old female with past medical history of arthritis, chronic pain, morbid obesity, motion sickness, MRSA carrier, seizures, on Dilantin, and thrombocytosis, who presents to the hospital with the above-mentioned symptom. History was primarily obtained from the patient. The patient reports her symptoms started about 3 weeks back. She started having some headache associated with intermittent vertigo-type symptoms and slurred speech. The patient reports she felt dizzy, felt as if she could not concentrate on the vision, went to her office this Monday and was not able to see the computer screen. The patient reports she went to her primary care physician, had an MRI they ordered. MRI showed subacute stroke in the occipital lobe, and she was referred to the ER for further management and evaluation. The patient reports that her symptoms basically started about 2 months back. Around , she had a cervical decompression done because of paresthesias of both the arm on emergent basis. Since then, she has had continued paresthesia. She has had some nausea. She has just been feeling weak and has had vertigo. The patient reports that they were attributing it to her Dilantin level because she has been on Dilantin for a greater part of her life, and Dilantin levels have been fluctuating. The patient denies any other complaints or problems.   Denies any headache, trouble swallowing, trouble with speech, chest pain, shortness of breath, cough, fever, chills, abdominal pain, constipation, diarrhea, urinary symptoms, focal or generalized neurological weakness, recent travel, sick contacts, falls, injuries, hematemesis, melena, hemoptysis, hematuria or any other concerns or problems. PAST MEDICAL HISTORY:  See above. HOME MEDICATIONS:  Currently, the patient is on phenytoin 300 mg daily and phenytoin 30 mg ER capsule daily. ALLERGIES:  NO KNOWN DRUG ALLERGIES. SOCIAL HISTORY:  Denies tobacco abuse. Occasional alcohol. No IV drug abuse. FAMILY HISTORY:  Mother has history of breast cancer. Mother has history of diabetes. Father has history of . Father has history of heart disease. REVIEW OF SYMPTOMS:  All systems were reviewed and found to be essentially negative, except for the symptoms mentioned above. PHYSICAL EXAMINATION:  VITAL SIGNS:  Temperature 98, pulse 66, respiratory rate 16, blood pressure 122/72, pulse oximetry 98% on room air. GENERAL:  Alert x3, awake, mildly distressed, pleasant female, appears to be stated age. HEENT:  Pupils equal and reactive to light. Dry mucous membranes. Tympanic membranes clear. NECK:  Supple. CHEST:  Clear to auscultation bilaterally. HEART:  S1, S2 heard. ABDOMEN:  Soft, nontender, nondistended. Bowel sounds are physiological.  EXTREMITIES:  No clubbing, no cyanosis, no edema. NEURO/PSYCH:  Pleasant mood and affect. Cranial nerves II through XII are grossly intact. Sensory grossly within normal limits. DTRs 2+ x4. Strength 5/5. SKIN:  Warm. LABORATORY DATA:  White count 6.2, hemoglobin 13, hematocrit 41.5, platelets 264. Urine shows no signs of infection. Sodium 141, potassium 3.9, chloride 107, bicarbonate 20, anion gap 4, glucose 90, BUN 15, creatinine 0.75, calcium 9.2, bilirubin total 0.2, ALT 26, AST 15, alkaline phosphatase 87. MRI of the brain shows subacute infarction of the right lateral occipital and parietal lobes with a smaller area involving the right frontal lobe. This likely occurred within the last couple of days. ASSESSMENT AND PLAN:  1. Acute cerebrovascular accident:  The patient will be observed on a neurotelemetry bed. We will get neurology consult.   Start the patient on aspirin, statin. Neurovascular checks. Supportive care. Close monitoring. The patient continues to have blurry vision. Physical therapy/occupational therapy/speech consult. We will get an echocardiogram.  I spoke with Dr. Daphnie Byrd of Radiology, and he recommended a CTA which has been ordered. Further intervention per hospital course. Continue to closely monitor. Reassess as needed. Await Neurology input. 2.  Hypertension:  Continue home medication. Optimize blood pressure control. Continue to closely monitor. Further intervention per hospital course. Reassess as needed. 3.  Recent spinal surgery:  MRI of the spine has been ordered to rule out any cerebrospinal fluid leak or any postoperative issues. Continue to monitor. Continue physical therapy. 4.  Thrombocytosis:  Hematology consult  and continue to monitor. Further intervention per hospital course. 5.  Gastrointestinal/deep venous thrombosis prophylaxis:  The patient will be on heparin. FUNCTIONAL STATUS:  Walks without assistance.       Triny Ac MD [id number]  D:  03/13/2020 17:08  T:  03/13/2020 20:34  JOB #:  [id number]

## 2020-03-15 VITALS
HEIGHT: 68 IN | WEIGHT: 293 LBS | DIASTOLIC BLOOD PRESSURE: 70 MMHG | HEART RATE: 62 BPM | OXYGEN SATURATION: 100 % | BODY MASS INDEX: 44.41 KG/M2 | SYSTOLIC BLOOD PRESSURE: 126 MMHG | RESPIRATION RATE: 12 BRPM | TEMPERATURE: 97.8 F

## 2020-03-15 PROCEDURE — 74011250636 HC RX REV CODE- 250/636: Performed by: FAMILY MEDICINE

## 2020-03-15 PROCEDURE — 74011250637 HC RX REV CODE- 250/637: Performed by: FAMILY MEDICINE

## 2020-03-15 RX ORDER — ASPIRIN 325 MG
325 TABLET ORAL DAILY
Qty: 30 TAB | Refills: 0 | Status: SHIPPED | OUTPATIENT
Start: 2020-03-15 | End: 2020-03-15 | Stop reason: SDUPTHER

## 2020-03-15 RX ORDER — ATORVASTATIN CALCIUM 80 MG/1
80 TABLET, FILM COATED ORAL
Qty: 30 TAB | Refills: 0 | Status: SHIPPED | OUTPATIENT
Start: 2020-03-15 | End: 2021-05-05

## 2020-03-15 RX ORDER — ASPIRIN 325 MG
325 TABLET ORAL DAILY
Qty: 30 TAB | Refills: 0 | Status: SHIPPED | OUTPATIENT
Start: 2020-03-15

## 2020-03-15 RX ORDER — ATORVASTATIN CALCIUM 80 MG/1
80 TABLET, FILM COATED ORAL
Qty: 30 TAB | Refills: 0 | Status: SHIPPED | OUTPATIENT
Start: 2020-03-15 | End: 2020-03-15 | Stop reason: SDUPTHER

## 2020-03-15 RX ADMIN — PHENYTOIN SODIUM 30 MG: 100 CAPSULE ORAL at 08:34

## 2020-03-15 RX ADMIN — HEPARIN SODIUM 5000 UNITS: 5000 INJECTION INTRAVENOUS; SUBCUTANEOUS at 03:00

## 2020-03-15 RX ADMIN — ASPIRIN 325 MG ORAL TABLET 325 MG: 325 PILL ORAL at 08:33

## 2020-03-15 RX ADMIN — PHENYTOIN SODIUM 300 MG: 100 CAPSULE ORAL at 08:33

## 2020-03-15 RX ADMIN — FAMOTIDINE 20 MG: 20 TABLET ORAL at 08:34

## 2020-03-15 NOTE — PROGRESS NOTES
[address]  [facility] [address] [address] [phone]    Work/School Note    Date: 3/13/2020    To Whom It May concern:    Evelyn Byrd was seen and treated in the hospital from 3/13/2020 to 3/15/2020 for stroke with vision changes.   Recommend atleast 4 weeks off work and driving and cleared by Neurology         Evelyn Byrd may return to work on * can not determine now\"    Sincerely,      Ralph Wolf MD

## 2020-03-15 NOTE — DISCHARGE SUMMARY
Discharge Summary     Patient:  Sree Vinson       MRN: [id number]       YOB: 1965       Age: 62 y.o. Date of admission:  3/13/2020    Date of discharge:  3/15/2020    Primary care provider: Dr. Julian Whitley MD    Admitting provider:  Preet Domínguez MD    Discharging provider:  Marcella Desouza [address] [phone]. If unavailable, call [phone] and ask the  to page the triage hospitalist.    Consultations  · IP CONSULT TO HOSPITALIST  · IP CONSULT TO HEMATOLOGY  · IP CONSULT TO NEUROLOGY  · IP CONSULT TO HEMATOLOGY    Procedures  · * No surgery found *    Discharge destination: home. The patient is stable for discharge. Admission diagnosis  · TIA (transient ischemic attack) [G45.9]  · CVA (cerebral vascular accident) ([facility] [I63.9]    Current Discharge Medication List      START taking these medications    Details   aspirin (ASPIRIN) 325 mg tablet Take 1 Tab by mouth daily. Qty: 30 Tab, Refills: 0      atorvastatin (LIPITOR) 80 mg tablet Take 1 Tab by mouth nightly. Qty: 30 Tab, Refills: 0         CONTINUE these medications which have NOT CHANGED    Details   ! ! phenytoin ER (Dilantin Extended) 100 mg ER capsule Take 300 mg by mouth Daily (before breakfast). Take with Dilantin 30 mg capsule for total daily dose of 330 mg every morning      ! ! phenytoin ER (Dilantin) 30 mg ER capsule Take 30 mg by mouth Daily (before breakfast). Take with Dilantin 300 mg capsule for total daily dose of 330 mg every morning        !! - Potential duplicate medications found. Please discuss with provider.           Follow-up Information     Follow up With Specialties Details Why Contact Info    Julian Whitley MD [facility] Practice In 1 week  [address] [facility] [address]  [facility], DO Neurology In 4 weeks  [address] [address]  [phone]      Ping Bridges MD Hematology and Oncology, Internal Medicine, Hematology, Oncology   [address] [place] [address]  [phone]            Final discharge diagnoses and brief hospital course  The patient is a 61-year-old female with past medical history of arthritis, chronic pain, morbid obesity, motion sickness, MRSA carrier, seizures, on Dilantin, and thrombocytosis, who presents to the hospital with blurry vision. The patient reports her symptoms started about 3 weeks back.  She started having some headache associated with intermittent vertigo-type symptoms and slurred speech.  The patient reports she felt dizzy, felt as if she could not concentrate on the vision, went to her office this Monday and was not able to see the computer screen.  The patient reports she went to her primary care physician, had an MRI they ordered. Richie Conde showed subacute stroke in the occipital lobe, and she was referred to the ER for further management and evaluation.  The patient reports that her symptoms basically started about 2 months back.  Around Thanksgiving, she had a cervical decompression done because of paresthesias of both the arm on emergent basis.  Since then, she has had continued paresthesia.   3/13/2020    Acute CVA  - MRI : Subacute infarction to the right lateral occipital and parietal lobes with a smaller area involving right frontal lobe  - CT  Head: Right parietooccipital ischemia  - CTA head: No evidence of large vessel occlusion or hemodynamically significant stenosis.   - Echo: normal EF  - , A1c 5.4  - Neuorlogy on board  - c/w aspirin 325mg and lipitor 80mg  - need outpt vision OT  - recommend repeat MRI Brain WWO 4 weeks to assess for interval resolution of enhancement, exclude alternative etiology/neoplasm  - No driving due to visual deficits and advise not returning to work until reassessment at follow up 4 weeks wit neurology due to cognitive concerns     Thrombocytosis:   - appears chronic but worsening  - appreciate  Hematology input  \" this could be primary bone marrow disorder. \"   - Jak2 V617F mutation with reflex testing for Jak2 exon 12 mutation, MPL mutation sent , pending   - Follow up in clinic in 2-3 weeks     Recent cervical spinal surgery:    - MRI of the spine: Postsurgical changes of C5-C6 ACDF without complication. No acute abnormality  in the cervical spine.     Hx seizures - c/w phenytoin     Recommendations:  PCP f/u in 1 week  Hematology in 2-3 weeks  Neurology in 4 weeks  Brain Mri in 4 weeks  Outpt vision OT    Discharge plan discussed in detail with patient and she understood and agreed. Physical examination at discharge  Visit Vitals  /71 (BP 1 Location: Left arm, BP Patient Position: At rest)   Pulse 60   Temp 98 °F (36.7 °C)   Resp 18   Ht 5' 8\" (1.727 m)   Wt 137 kg (302 lb 0.5 oz)   SpO2 100%   BMI 45.92 kg/m²     Gen: NAD  HEENT: anicteric sclerae, normal conjunctiva, oropharynx clear, MM moist  Neck: supple, trachea midline, no adenopathy  Heart: RRR, no MRG, no JVD, no peripheral edema  Lungs: CTA b/l, non-labored respirations  Abd: soft, NT, ND, BS+, no organomegaly  Extr: warm  Skin: dry, no rash  Neuro: vision decreased bilaterally, normal speech, moves all extremities  Psych: normal mood     Pertinent imaging studies:    As above     Recent Labs     03/14/20  0143 03/13/20  1200   WBC 5.8 6.2   HGB 11.6 13.0   HCT 36.6 41.5   * 913*     Recent Labs     03/13/20  1200      K 3.9      CO2 30   BUN 15   CREA 0.75   GLU 90   CA 9.2     Recent Labs     03/13/20  1200   SGOT 15   AP 87   TP 7.9   ALB 4.2   GLOB 3.7     No results for input(s): INR, PTP, APTT, INREXT in the last 72 hours. Recent Labs     03/14/20  1835   TIBC 308   PSAT 42   FERR 24      No results for input(s): PH, PCO2, PO2 in the last 72 hours. No results for input(s): CPK, CKMB in the last 72 hours.     No lab exists for component: TROPONINI  No components found for: GLPOC    Chronic Diagnoses:    Problem List as of 3/15/2020 Date Reviewed: 9/18/2019          Codes Class Noted - Resolved    CVA (cerebral vascular accident) [facility]) ICD-10-CM: I63.9  ICD-9-CM: 434.91  3/14/2020 - Present        Spinal stenosis, lumbar ICD-10-CM: M48.061  ICD-9-CM: 724.02  9/18/2019 - Present        Lumbar stenosis ICD-10-CM: M48.061  ICD-9-CM: 724.02  9/18/2019 - Present        MRSA carrier ICD-10-CM: Z22.322  ICD-9-CM: V02.54  9/13/2019 - Present        Thrombocytosis ([facility] ICD-10-CM: D47.3  ICD-9-CM: 238.71  9/12/2019 - Present        Primary localized osteoarthritis of right knee ICD-10-CM: M17.11  ICD-9-CM: 715.16  4/12/2018 - Present        Primary localized osteoarthritis of right hip ICD-10-CM: M16.11  ICD-9-CM: 715.15  2/2/2017 - Present        Primary osteoarthritis of both hips ICD-10-CM: M16.0  ICD-9-CM: 715.15  4/20/2016 - Present        Morbid obesity with BMI of 45.0-49.9, adult ([facility] ICD-10-CM: E66.01, Z68.42  ICD-9-CM: 278.01, V85.42  4/20/2016 - Present        RESOLVED: TIA (transient ischemic attack) ICD-10-CM: G45.9  ICD-9-CM: 435.9  3/13/2020 - 3/14/2020              Time spent on discharge related activities today greater than 30 minutes.       Signed:  Lore David MD                 Hospitalist, Internal Medicine      Cc: Judd Potter MD

## 2020-03-15 NOTE — PROGRESS NOTES
Stroke Education documented in Patient Education: YES  Core Measures Documented in Connect Care:  Risk Factors: YES  Warning signs of stroke: YES  When to Activate 911: YES  Medication Education for Risk Factors: YES  Smoking cessation if applicable: NO  Written Education Given:  YES    Discharge NIH Completed: YES  Score: 0    BRAINS: YES    Follow Up Appointment Made: NO  Date/Time if applicable:

## 2020-03-15 NOTE — DISCHARGE INSTRUCTIONS
Learning About an Ischemic Stroke  What is an ischemic stroke? An ischemic (say \"tiy-PWV-myoz\") stroke occurs when a blood clot blocks a blood vessel in the brain. This means that blood cannot flow to some part of the brain. Without blood and the oxygen it carries, this part of the brain starts to die. The part of the body controlled by the damaged area of the brain cannot work properly. This is different from a hemorrhagic (say \"jvd-hhy-SM-nevilleck\") stroke, which happens when a blood vessel in the brain has burst open or has started to leak. The brain damage from a stroke starts within minutes. Quick treatment can help limit damage to the brain and make recovery more likely. People who have had a stroke may have a hard time talking, understanding things, and making decisions. They may have to relearn daily activities, such as how to eat, bathe, and dress. How well someone recovers from a stroke depends on how quickly the person gets to the hospital, where in the brain the stroke happened, and how severe it was. Training and therapy also make a difference in how well people recover. What are the symptoms? If you have any of these symptoms, call 911 or other emergency services right away:  · You have symptoms of a stroke. These may include:  ? Sudden numbness, tingling, weakness, or loss of movement in your face, arm, or leg, especially on only one side of your body. ? Sudden vision changes. ? Sudden trouble speaking. ? Sudden confusion or trouble understanding simple statements. ? Sudden problems with walking or balance. ? A sudden, severe headache that is different from past headaches. See your doctor if you have symptoms that seem like a stroke, even if they go away quickly. You may have had a transient ischemic attack (TIA), sometimes called a mini-stroke. A TIA is a warning that a stroke may happen soon. Getting early treatment for a TIA can help prevent a stroke.   What causes an ischemic stroke? An ischemic stroke is caused by a blood clot that blocks blood flow in the brain. The most common causes of blood clots include:  · Hardening of the arteries (atherosclerosis). This is caused by high blood pressure, diabetes, high cholesterol, or smoking. · Atrial fibrillation. How is ischemic stroke treated? Emergency treatment may be done to restore blood flow to the brain using medicine or a procedure. You may take medicines to help prevent another stroke. Ask your doctor if a stroke rehab program is right for you. How can you prevent another stroke? · Work with your doctor to treat any health problems you have. High blood pressure, high cholesterol, atrial fibrillation, and diabetes all raise your chances of having a stroke. · Be safe with medicines. Take your medicine exactly as prescribed. Call your doctor if you think you are having a problem with your medicine. · Have a healthy lifestyle. ? Do not smoke or allow others to smoke around you. If you need help quitting, talk to your doctor about stop-smoking programs and medicines. These can increase your chances of quitting for good. Smoking makes a stroke more likely. ? Limit alcohol to 2 drinks a day for men and 1 drink a day for women. ? Lose weight if you need to. A healthy weight will help you keep your heart and body healthy. ? Be active. Ask your doctor what type and level of activity is safe for you.  ? Eat heart-healthy foods, like fruits, vegetables, and high-fiber foods. Follow-up care is a key part of your treatment and safety. Be sure to make and go to all appointments, and call your doctor if you are having problems. It's also a good idea to know your test results and keep a list of the medicines you take. Where can you learn more? Go to http://prakash-poli.info/  Enter D161 in the search box to learn more about \"Learning About an Ischemic Stroke. \"  Current as of: July 14, 2019Content Version: 12.4  © 3979-0558 Healthwise, Incorporated. Care instructions adapted under license by [employer] (which disclaims liability or warranty for this information). If you have questions about a medical condition or this instruction, always ask your healthcare professional. [address] any warranty or liability for your use of this information.        Learning About How to Prevent Another Stroke  What can you do to prevent another stroke? After a stroke, people feel lots of different emotions. Some people are worried that they could have another stroke. Or they may feel overwhelmed by how much there is to learn and do. Some people feel sad or depressed. No matter what emotions you are feeling, you can give yourself some control and peace of mind by making a plan to lower your risk of having another stroke. Take your medicines  You'll need to take medicines to help prevent another stroke. Be sure to take your medicines exactly as prescribed. And don't stop taking them unless your doctor tells you to. If you stop taking your medicines, you can increase your risk of having another stroke. Some of the medicines your doctor may prescribe include:  · Aspirin or some other blood thinner to prevent blood clots. · Statins and other medicines to lower cholesterol. · Blood pressure medicines to lower blood pressure. Manage other health problems  You can help lower your chance of having another stroke by managing certain other health problems. Problems that increase your risk of having another stroke include:  · High blood pressure. · High cholesterol. · Atrial fibrillation. · Diabetes. If you have any of these health problems, you can manage them with healthy lifestyle changes along with medicine. Adopt a healthy lifestyle  · Do not smoke or allow others to smoke around you. If you need help quitting, talk to your doctor about stop-smoking programs and medicines.  These can increase your chances of quitting for good. Smoking makes a stroke more likely. · Limit alcohol to 2 drinks a day for men and 1 drink a day for women. · Lose weight if you need to. Controlling your weight will help you keep your heart and body healthy. · Be active. Ask your doctor what type and level of activity is safe for you. · Eat heart-healthy foods, like fruits, vegetables, and high-fiber foods. It's also important to:  · Get a flu shot every year. · Ask for help if you think you are depressed. Do stroke rehab  Taking part in a stroke rehabilitation (rehab) program will help you to regain skills you lost or make the most of your abilities after a stroke. It also helps you take steps to prevent another stroke. Your rehab team will give you education and support to help you build new, healthy habits. You'll learn how to manage any other health problems that you might have. Ekaterina Linder also learn how to exercise safely, eat a healthy diet, and quit smoking if you smoke. Ekaterina Linder work with your team to decide what lifestyle choices are best for you. If your doctor hasn't already suggested it, ask him or her if stroke rehab is right for you. Know stroke symptoms  Make sure you know the symptoms of stroke. FAST is a simple way to remember. Recognizing these symptoms helps you know when to call for medical help. FAST stands for:  · F ace drooping. · A rm weakness. · S peech difficulty. · T ankit to call 911. Follow-up care is a key part of your treatment and safety. Be sure to make and go to all appointments, and call your doctor if you are having problems. It's also a good idea to know your test results and keep a list of the medicines you take. Where can you learn more? Go to http://prakash-poli.info/  Enter Y039 in the search box to learn more about \"Learning About How to Prevent Another Stroke. \"  Current as of: July 14, 2019Content Version: 12.4  © 4590-2500 [employer].   Care instructions adapted under license by [employer] (which disclaims liability or warranty for this information). If you have questions about a medical condition or this instruction, always ask your healthcare professional. [address] any warranty or liability for your use of this information.        Please bring this form with you to show your primary care provider at your follow-up appointment. Primary care provider:  Dr. Lon Rowell MD    Discharging provider:  Derrek Rivas MD    You have been admitted to the hospital with the following diagnoses:  · TIA (transient ischemic attack) [G45.9]  · CVA (cerebral vascular accident) [facility]) [I63.9]    FOLLOW-UP CARE RECOMMENDATIONS:    APPOINTMENTS:  · Follow-up with primary care provider, Dr. Lon Rowell MD  -  Please call [phone] shortly after discharge to set up an appointment to be seen in  1 week   · Neurology in 3-4 weeks, hematology in 2 weeks     FOLLOW-UP TESTS recommended: MRI brain in 4 weeks     PENDING TEST RESULTS:  At the time of your discharge the following test results are still pending: Solomon 2 analysis  Please make sure you review these results with your outpatient follow-up provider(s). SYMPTOMS to watch for: chest pain, shortness of breath, fever, chills, nausea, vomiting, diarrhea, change in mentation, falling, weakness, bleeding. DIET/what to eat:  Cardiac Diet    ACTIVITY:  Activity as tolerated      What to do if new or unexpected symptoms occur? If you experience any of the above symptoms (or should other concerns or questions arise after discharge) please call your primary care physician. Return to the emergency room if you cannot get hold of your doctor. · It is very important that you keep your follow-up appointment(s). · Please bring discharge papers, medication list (and/or medication bottles) to your follow-up appointments for review by your outpatient provider(s).   · Please check the list of medications and be sure it includes every medication (even non-prescription medications) that your provider wants you to take. · It is important that you take the medication exactly as they are prescribed. · Keep your medication in the bottles provided by the pharmacist and keep a list of the medication names, dosages, and times to be taken in your wallet. · Do not take other medications without consulting your doctor. · If you have any questions about your medications or other instructions, please talk to your nurse or care provider before you leave the hospital.    I understand that if any problems occur once I am at home I am to contact my physician.     These instructions were explained to me and I had the opportunity to ask questions.

## 2020-03-16 ENCOUNTER — TELEPHONE (OUTPATIENT)
Dept: NEUROLOGY | Age: 55
End: 2020-03-16

## 2020-03-16 DIAGNOSIS — R93.0 ABNORMAL MRI OF HEAD: Primary | ICD-10-CM

## 2020-03-16 NOTE — TELEPHONE ENCOUNTER
Pt's  calling to schedule hospital f/u. She is scheduled for 5/1/2020, he said she needs a letter excusing her from work until then. He also stated she needs an MRI before her appt, he would like to make sure an order has been put in.  Please call

## 2020-03-17 NOTE — TELEPHONE ENCOUNTER
Spoke with , informed him of MRI order and needing it done 4 weeks after previous MRI. He verbalized understanding. I told him  was willing to type up a note for patient to be out of work, he says her PCP has already done so but will contact us if he needs anything further.

## 2020-03-18 LAB
BACKGROUND, CALR2T: NORMAL
CALR MUTATION DETECTION RESULT, CALR1T: NORMAL
LAB DIRECTOR NAME PROVIDER: NORMAL
REF LAB TEST METHOD: NORMAL
REFERENCES, CALR4T: NORMAL

## 2020-03-19 LAB
BACKGROUND: 489207: ABNORMAL
DIRECTOR REVIEW: 489204: ABNORMAL
JAK2 P.V617F BLD/T QL: ABNORMAL

## 2020-03-23 ENCOUNTER — HOSPITAL ENCOUNTER (OUTPATIENT)
Dept: MRI IMAGING | Age: 55
Discharge: HOME | End: 2020-03-23
Attending: PSYCHIATRY & NEUROLOGY
Payer: COMMERCIAL

## 2020-03-23 DIAGNOSIS — R93.0 ABNORMAL MRI OF HEAD: ICD-10-CM

## 2020-03-23 PROCEDURE — 70553 MRI BRAIN STEM W/O & W/DYE: CPT

## 2020-03-23 PROCEDURE — 74011250636 HC RX REV CODE- 250/636: Performed by: PSYCHIATRY & NEUROLOGY

## 2020-03-23 PROCEDURE — A9575 INJ GADOTERATE MEGLUMI 0.1ML: HCPCS | Performed by: PSYCHIATRY & NEUROLOGY

## 2020-03-23 RX ORDER — GADOTERATE MEGLUMINE 376.9 MG/ML
20 INJECTION INTRAVENOUS
Status: COMPLETED | OUTPATIENT
Start: 2020-03-23 | End: 2020-03-23

## 2020-03-23 RX ADMIN — GADOTERATE MEGLUMINE 20 ML: 376.9 INJECTION INTRAVENOUS at 19:56

## 2020-04-09 ENCOUNTER — OFFICE VISIT (OUTPATIENT)
Dept: ONCOLOGY | Age: 55
End: 2020-04-09

## 2020-04-09 VITALS
HEIGHT: 68 IN | DIASTOLIC BLOOD PRESSURE: 74 MMHG | HEART RATE: 65 BPM | OXYGEN SATURATION: 92 % | BODY MASS INDEX: 44.41 KG/M2 | SYSTOLIC BLOOD PRESSURE: 112 MMHG | TEMPERATURE: 97.3 F | WEIGHT: 293 LBS

## 2020-04-09 DIAGNOSIS — D47.3 ESSENTIAL THROMBOCYTOSIS (HCC): Primary | ICD-10-CM

## 2020-04-09 RX ORDER — HYDROXYUREA 500 MG/1
500 CAPSULE ORAL DAILY
Qty: 30 CAP | Refills: 2 | Status: SHIPPED | OUTPATIENT
Start: 2020-04-09 | End: 2020-05-08 | Stop reason: SDUPTHER

## 2020-04-09 NOTE — LETTER
4/9/20 Patient: Charmayne Case YOB: 1965 Date of Visit: 4/9/2020 [facility] Mills, [address] VIA Facsimile: [phone] Dear Garrett Mills MD, Thank you for referring Ms. Valentine Sorensen to [address] [place] [address] for evaluation. My notes for this consultation are attached. If you have questions, please do not hesitate to call me. I look forward to following your patient along with you.  
 
 
Sincerely, 
 
Yvette Masterson MD

## 2020-04-09 NOTE — PROGRESS NOTES
[facility]  [address], [address] [facility], [facility] [place], [address]  [phone]       Progress Note      Patient: Aneudy Pratt MRN: [id number]  SSN: [id number]    YOB: 1965  Age: 62 y.o. Sex: female      Subjective:      Aneudy Pratt is a 62 y.o. female who I am seeing in follow up for thrombocytosis. She is admitted with subacute difficulties with visual processing, cognitive impairment, slurred speech, dizziness, headaches x 2-3 weeks. MRI Brain this admission reveals restricted diffusion and enhancement of the R parieto-occipital and R frontal territories with rather confluent T2 hyperintensity in a similar distribution on FLAIR. CTA H/N did not reveal any significant stenosis of LVO. Platelet count is high. She is feeling better. Mutation analysis was done in the hospital.     She denies excessive weight loss, night sweats or fevers.       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    Seizures ([address]     since age 1 last seizure 2006; on dilantin (drug level checked annually by PCP)    Stroke ([address]     Thrombocytosis ([address] 2019     Past Surgical History:   Procedure Laterality Date    HX BREAST BIOPSY Left     NEG    HX  SECTION      x 2    HX HEENT      LASIK    HX KNEE ARTHROSCOPY Bilateral     HX KNEE REPLACEMENT Right 2018    HX ORTHOPAEDIC Left     TKR    HX [address] Left [address]    HX ORTHOPAEDIC Right     RTH    HX OTHER SURGICAL      BIOPSY LEFT LOWER LEG(AGE 23)    HX TONSILLECTOMY      HX TUBAL LIGATION        Family History   Problem Relation Age of Onset    Cancer Mother 48        breast cancer    Diabetes Mother     [de-identified] Problems Mother         PONV    Heart Disease Father     Hypertension Father     No Known Problems Brother      Social History     Tobacco Use    Smoking status: Never Smoker    Smokeless tobacco: Never Used   Substance Use Topics    Alcohol use: Yes     Comment: <1/wk      Prior to Admission medications    Medication Sig Start Date End Date Taking? Authorizing Provider   hydroxyurea (Hydrea) 500 mg capsule Take 1 Cap by mouth daily for 90 days. 4/9/20 7/8/20 Yes Anuel Lynn MD   aspirin (ASPIRIN) 325 mg tablet Take 1 Tab by mouth daily. 3/15/20  Yes Herb Huston MD   atorvastatin (LIPITOR) 80 mg tablet Take 1 Tab by mouth nightly. 3/15/20  Yes Herb Huston MD   OTHER [facility] 3/15/20  Yes Herb Huston MD   phenytoin ER (Dilantin Extended) 100 mg ER capsule Take 300 mg by mouth Daily (before breakfast). Take with Dilantin 30 mg capsule for total daily dose of 330 mg every morning   Yes Provider, Historical   phenytoin ER (Dilantin) 30 mg ER capsule Take 30 mg by mouth Daily (before breakfast). Take with Dilantin 300 mg capsule for total daily dose of 330 mg every morning    Yes Provider, Historical              No Known Allergies        Objective:     Vitals:    04/09/20 1533   BP: 112/74   Pulse: 65   Temp: 97.3 °F (36.3 °C)   TempSrc: Oral   SpO2: 92%   Weight: 308 lb 12.8 oz (140.1 kg)   Height: 5' 8\" (1.727 m)            Physical Exam:    GENERAL: alert, cooperative, no distress, appears stated age  EYE: conjunctivae/corneas clear. PERRL, EOM's intact  LYMPHATIC: Cervical, supraclavicular, and axillary nodes normal.   THROAT & NECK: normal and no erythema or exudates noted.    LUNG: clear to auscultation bilaterally  HEART: regular rate and rhythm, S1, S2 normal, no murmur, click, rub or gallop  ABDOMEN: soft, non-tender. Bowel sounds normal. No masses,  no organomegaly  EXTREMITIES:  extremities normal, atraumatic, no cyanosis or edema  SKIN: Normal.  NEUROLOGIC: AOx3. Gait normal. Reflexes and motor strength normal and symmetric. Cranial nerves 2-12 and sensation grossly intact. Lab Results   Component Value Date/Time    WBC 5.8 03/14/2020 01:43 AM    HGB 11.6 03/14/2020 01:43 AM    HCT 36.6 03/14/2020 01:43 AM    PLATELET 746 (H) 73/86/3037 01:43 AM    .9 (H) 03/14/2020 01:43 AM             Assessment:     1. Essential Thrombocytosis    Solomon 2 [id number] +ve  High risk   On ASA  Recent CVA. Recommended starting Hydroxyurea. Discussed pros and cons, side effects and their management. She is willing to take the medicine. Will repeat labs in 1 month      Plan:        1. Start Hydrea 500 mg /day   2. Continue ASA  3. CBC in 1 month  4. RTC in 3 months      Signed by: Columba Madera MD                     April 9, 2020      CC.  Liseth Michele MD

## 2020-04-09 NOTE — PROGRESS NOTES
Claudia Matta is a 62 y.o. cauc female here for new patient appt for:  Chief Complaint   Patient presents with   [facility] Follow Up    Thrombocytosis   Pt takes Dilantin    1. Have you been to the ER, urgent care clinic since your last visit? Hospitalized since your last visit? New Pt    2. Have you seen or consulted any other health care providers outside of the [address] [place] [address] since your last visit? Include any pap smears or colon screening. New Pt     Pt was in ED 3/13 - 15th for TIA. She is seeing a [facility] occupational [facility] now. 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 Platelets get high whenever she has surgeries. Has been put on blood thinners after surgeries before but did not for back surgery. Pt here with .

## 2020-04-09 NOTE — PROGRESS NOTES
VORB FROM DR Nando Smith HYDREA (HYDROXYUREA) 500MG CAP TAKE 1 CAP BY MOUTH ONCE DAILY DISPENSE 30 R 2, CBC WITH DIFF IN ONE MONTH. FOLLOW UP IN 3 MONTHS.

## 2020-04-15 ENCOUNTER — VIRTUAL VISIT (OUTPATIENT)
Dept: NEUROLOGY | Age: 55
End: 2020-04-15

## 2020-04-15 VITALS — BODY MASS INDEX: 44.41 KG/M2 | HEIGHT: 68 IN | WEIGHT: 293 LBS | RESPIRATION RATE: 18 BRPM

## 2020-04-15 DIAGNOSIS — R41.89 COGNITIVE IMPAIRMENT: ICD-10-CM

## 2020-04-15 DIAGNOSIS — I63.9 CEREBROVASCULAR ACCIDENT (CVA), UNSPECIFIED MECHANISM (HCC): Primary | ICD-10-CM

## 2020-04-15 DIAGNOSIS — D47.3 ESSENTIAL THROMBOCYTOSIS (HCC): ICD-10-CM

## 2020-04-15 DIAGNOSIS — H53.40 VISUAL FIELD CUT: ICD-10-CM

## 2020-04-15 NOTE — PATIENT INSTRUCTIONS
PRESCRIPTION REFILL POLICY [facility] Statement to Patients April 1, 2014 In an effort to ensure the large volume of patient prescription refills is processed in the most efficient and expeditious manner, we are asking our patients to assist us by calling your Pharmacy for all prescription refills, this will include also your  Mail Order Pharmacy. The pharmacy will contact our office electronically to continue the refill process. Please do not wait until the last minute to call your pharmacy. We need at least 48 hours (2days) to fill prescriptions. We also encourage you to call your pharmacy before going to  your prescription to make sure it is ready. With regard to controlled substance prescription refill requests (narcotic refills) that need to be picked up at our office, we ask your cooperation by providing us with at least 72 hours (3days) notice that you will need a refill. We will not refill narcotic prescription refill requests after 4:00pm on any weekday, Monday through Thursday, or after 2:00pm on Fridays, or on the weekends. We encourage everyone to explore another way of getting your prescription refill request processed using [employer], our patient web portal through our electronic medical record system. [employer] is an efficient and effective way to communicate your medication request directly to the office and  downloadable as an shane on your smart phone . [employer] also features a review functionality that allows you to view your medication list as well as leave messages for your physician. Are you ready to get connected? If so please review the attatched instructions or speak to any of our staff to get you set up right away! Thank you so much for your cooperation. Should you have any questions please contact our Practice Administrator. The Physicians and Staff,  [facility]

## 2020-04-15 NOTE — PROGRESS NOTES
Neurology Clinic Follow up Note    Patient ID:  Ramya Nguyen  [id number]  62 y.o.  1965      Ms. Sorensen is here for follow up today of  Chief Complaint   Patient presents with   [facility] Follow Up          Last Appointment With Me:  Hospital F/U 3/14/20    This is a telemedicine visit that was performed with the originating site at [facility] and the distant site at patient's home. Verbal consent to participate in video visit was obtained. The patient was identified by name and date of birth. This visit occurred during the Coronavirus [phone]) [facility] Emergency. I discussed with the patient the nature of our telemedicine visits, that:     I would evaluate the patient and recommend diagnostics and treatments based on my assessment   Our sessions are not being recorded and that personal health information is protected   Our team would provide follow up care in person if/when the patient needs it    \"62 y.o.  right handed  female, we were asked to see for stroke, vision deficit. PMH notable for seizure d/o since childhood on PHT, thrombocytosis. She is admitted due to recent abnormal MRI findings. Imaging was ordered by her PCP after patient reported difficulty with new onset headaches x 3 weeks, slurred speech, difficulty with reading/visual perception. According to the patient, she noted bi-frontal non-migrainous headaches over the past 3 weeks, slurred speech 1 week ago, difficulty processing words on her computer screen and reading after returning to work this past Monday (was out following cervical decompression). She also noted difficulty balancing her checkbook for the past couple weeks. She endorses some blurred vision and noted an episode of dizziness/vertigofollowing her surgery several weeks ago without headaches. She reports a h/o seizures since age 1 presenting as visual blindness bilaterally, preserved consciousness on PHT.   She denies any recent typical seizure activity. MRI Brain this admission reveals restricted diffusion and enhancement of the R parieto-occipital and R frontal territories with rather confluent T2 hyperintensity in a similar distribution on FLAIR. CTA H/N did not reveal any significant stenosis of LVO. Labs are notable for thrombocytosis (724), . \"    Interval History:   Patient reports she is still having some cognitive \"fog\", having difficulty with multitasking. She reports difficulty with calculations, L VF deficit. She notes some word finding difficulty as well. Denies new focal weakness, numbness, speech deficits. She is not working currently. She is compliant with ASA/statin therapy. She is engaged in outpatient OT/ST 1x weekly. Saw hematology +Jak2 with recommendations for hydroxyurea. MRI Brain repeated 3/23/20 showing reported typical evolution of subacute infarct R lateral occipital/parietal lobes. PMHx/ PSHx/ FHx/ SHx:  Reviewed and unchanged previous visit. 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 Objective:       Meds:  Current Outpatient Medications   Medication Sig Dispense Refill    hydroxyurea (Hydrea) 500 mg capsule Take 1 Cap by mouth daily for 90 days. 30 Cap 2    aspirin (ASPIRIN) 325 mg tablet Take 1 Tab by mouth daily. 30 Tab 0    atorvastatin (LIPITOR) 80 mg tablet Take 1 Tab by mouth nightly.  30 Tab 0    OTHER Outpatient [facility] 1 Units 0    phenytoin ER (Dilantin Extended) 100 mg ER capsule Take 300 mg by mouth Daily (before breakfast). Take with Dilantin 30 mg capsule for total daily dose of 330 mg every morning      phenytoin ER (Dilantin) 30 mg ER capsule Take 30 mg by mouth Daily (before breakfast). Take with Dilantin 300 mg capsule for total daily dose of 330 mg every morning          Exam:  Visit Vitals  Wt 138.3 kg (305 lb)   BMI 46.38 kg/m²     Due to this being a TeleHealth evaluation, many elements of the physical examination are unable to be assessed. Exam:  NEUROLOGICAL EXAM:  General: Awake, alert, speech fluent. Oriented to date/month/year, knows POTUS. Difficulty with serial 7's. Modified MOCA: 15/22 (see scanned media)  CN: EOMI, L VF cut, facial strength/sensation normal and symmetric, hearing is intact  Motor: AG x 4  Reflexes: deferred  Coordination: No ataxia. Sensation: LT intact throughout  Gait: Steady    LABS  Results for orders placed or performed during the hospital encounter of 03/13/20   CULTURE, URINE   Result Value Ref Range    Special Requests: NO SPECIAL REQUESTS  Reflexed from [id number]        [place] Count >100,000  COLONIES/mL        Culture result: MIXED UROGENITAL PETR ISOLATED     CULTURE, MRSA   Result Value Ref Range    Special Requests: NO SPECIAL REQUESTS      Culture result: MRSA NOT PRESENT      Culture result:            Screening of patient nares for MRSA is for surveillance purposes and, if positive, to facilitate isolation considerations in high risk settings. It is not intended for automatic decolonization interventions per se as regimens are not sufficiently effective to warrant routine use.    CBC WITH AUTOMATED DIFF   Result Value Ref Range    WBC 6.2 3.6 - 11.0 K/uL    RBC 4.05 3.80 - 5.20 M/uL    HGB 13.0 11.5 - 16.0 g/dL    HCT 41.5 35.0 - 47.0 %    .5 (H) 80.0 - 99.0 FL    MCH 32.1 26.0 - 34.0 PG    MCHC 31.3 30.0 - 36.5 g/dL    RDW 14.7 (H) 11.5 - 14.5 %    PLATELET 675 (H) 016 - 400 K/uL    MPV 11.6 8.9 - 12.9 FL    NRBC 0.0 0  WBC    ABSOLUTE NRBC 0.00 0.00 - 0.01 K/uL    NEUTROPHILS 66 32 - 75 %    LYMPHOCYTES 21 12 - 49 %    MONOCYTES 8 5 - 13 %    EOSINOPHILS 3 0 - 7 %    BASOPHILS 1 0 - 1 %    IMMATURE GRANULOCYTES 1 (H) 0.0 - 0.5 %    ABS. NEUTROPHILS 4.0 1.8 - 8.0 K/UL    ABS. LYMPHOCYTES 1.3 0.8 - 3.5 K/UL    ABS. MONOCYTES 0.5 0.0 - 1.0 K/UL    ABS. EOSINOPHILS 0.2 0.0 - 0.4 K/UL    ABS. BASOPHILS 0.1 0.0 - 0.1 K/UL    ABS. IMM. GRANS. 0.1 (H) 0.00 - 0.04 K/UL    DF SMEAR SCANNED      PLATELET COMMENTS Large Platelets      RBC COMMENTS ANISOCYTOSIS  1+        RBC COMMENTS MACROCYTOSIS  1+       METABOLIC PANEL, COMPREHENSIVE   Result Value Ref Range    Sodium 141 136 - 145 mmol/L    Potassium 3.9 3.5 - 5.1 mmol/L    Chloride 107 97 - 108 mmol/L    CO2 30 21 - 32 mmol/L    Anion gap 4 (L) 5 - 15 mmol/L    Glucose 90 65 - 100 mg/dL    BUN 15 6 - 20 MG/DL    Creatinine 0.75 0.55 - 1.02 MG/DL    BUN/Creatinine ratio 20 12 - 20      GFR est AA >60 >60 ml/min/1.73m2    GFR est non-AA >60 >60 ml/min/1.73m2    Calcium 9.2 8.5 - 10.1 MG/DL    Bilirubin, total 0.2 0.2 - 1.0 MG/DL    ALT (SGPT) 26 12 - 78 U/L    AST (SGOT) 15 15 - 37 U/L    Alk. phosphatase 87 45 - 117 U/L    Protein, total 7.9 6.4 - 8.2 g/dL    Albumin 4.2 3.5 - 5.0 g/dL    Globulin 3.7 2.0 - 4.0 g/dL    A-G Ratio 1.1 1.1 - 2.2     SAMPLES BEING HELD   Result Value Ref Range    SAMPLES BEING HELD 1RED,1BLU     COMMENT        Add-on orders for these samples will be processed based on acceptable specimen integrity and analyte stability, which may vary by analyte.    URINALYSIS W/ REFLEX CULTURE   Result Value Ref Range    Color YELLOW/STRAW      Appearance CLOUDY (A) CLEAR      Specific gravity 1.017 1.003 - 1.030      pH (UA) 7.5 5.0 - 8.0      Protein NEGATIVE  NEG mg/dL    Glucose NEGATIVE  NEG mg/dL    Ketone NEGATIVE  NEG mg/dL    Bilirubin NEGATIVE  NEG      Blood NEGATIVE  NEG      Urobilinogen 0.2 0.2 - 1.0 EU/dL    Nitrites NEGATIVE  NEG      Leukocyte Esterase TRACE (A) NEG      WBC 0-4 0 - 4 /hpf RBC 0-5 0 - 5 /hpf    Epithelial cells MANY (A) FEW /lpf    Bacteria 1+ (A) NEG /hpf    UA:UC IF INDICATED URINE CULTURE ORDERED (A) CNI     PHENYTOIN W RFLX FREE PHENYTOIN   Result Value Ref Range    Phenytoin 13.7 10.0 - 20.0 ug/mL   PHENYTOIN, FREE PROFILE   Result Value Ref Range    Phenytoin, free <0.4 (L) 1.0 - 2.0 ug/mL    Phenytoin free:total ratio Cannot be calculated %   LIPID PANEL   Result Value Ref Range    LIPID PROFILE          Cholesterol, total 201 (H) <200 MG/DL    Triglyceride 103 <150 MG/DL    HDL Cholesterol 80 MG/DL    LDL, calculated 100.4 (H) 0 - 100 MG/DL    VLDL, calculated 20.6 MG/DL    CHOL/HDL Ratio 2.5 0.0 - 5.0     HEMOGLOBIN A1C WITH EAG   Result Value Ref Range    Hemoglobin A1c 5.4 4.0 - 5.6 %    Est. average glucose 108 mg/dL   CBC W/O DIFF   Result Value Ref Range    WBC 5.8 3.6 - 11.0 K/uL    RBC 3.59 (L) 3.80 - 5.20 M/uL    HGB 11.6 11.5 - 16.0 g/dL    HCT 36.6 35.0 - 47.0 %    .9 (H) 80.0 - 99.0 FL    MCH 32.3 26.0 - 34.0 PG    MCHC 31.7 30.0 - 36.5 g/dL    RDW 14.6 (H) 11.5 - 14.5 %    PLATELET 362 (H) 310 - 400 K/uL    MPV 11.8 8.9 - 12.9 FL    NRBC 0.0 0  WBC    ABSOLUTE NRBC 0.00 0.00 - 0.01 K/uL   JAK2 MUTATION ANALYSIS   Result Value Ref Range    JAK2 Mutation Analysis Comment (A)      Director Review Comment      Background Comment     CALR MUTATION ANALYSIS   Result Value Ref Range    CALR Mutation Detection Result Comment      Background Comment      Methodology Comment      References Comment      Director Review Comment     IRON PROFILE   Result Value Ref Range    Iron 129 35 - 150 ug/dL    TIBC 308 250 - 450 ug/dL    Iron % saturation 42 20 - 50 %   FERRITIN   Result Value Ref Range    Ferritin 24 8 - 252 NG/ML   GLUCOSE, POC   Result Value Ref Range    Glucose (POC) 90 65 - 100 mg/dL    Performed by Erickson Ryan    GLUCOSE, POC   Result Value Ref Range    Glucose (POC) 97 65 - 100 mg/dL    Performed by Raffy Khanna    ECHO ADULT COMPLETE Result Value Ref Range    LA Volume 102.00 22 - 52 mL    LV E' Lateral Velocity 7.55 cm/s    LV E' Septal Velocity 7.90 cm/s    Tapse 1.94 1.5 - 2.0 cm    Ao Root D 3.20 cm    Aortic Valve Systolic Peak Velocity 854.35 cm/s    Aortic Valve Area by Continuity of Peak Velocity 2.1 cm2    AoV PG 10.9 mmHg    LVIDd 5.65 (A) 3.9 - 5.3 cm    LVPWd 0.93 (A) 0.6 - 0.9 cm    LVIDs 3.78 cm    IVSd 0.85 0.6 - 0.9 cm    LVOT d 2.05 cm    LVOT Peak Velocity 106.08 cm/s    LVOT Peak Gradient 4.5 mmHg    MVA (PHT) 3.8 cm2    MV A Bryce 74.71 cm/s    MV E Bryce 91.85 cm/s    MV E/A 1.23     Left Atrium to Aortic Root Ratio 1.48     RVIDd 4.22 cm    LA Vol 4C 88.43 (A) 22 - 52 mL    LA Vol 2C 111.17 (A) 22 - 52 mL    LA Area 4C 27.8 cm2    LV Mass .8 (A) 67 - 162 g    LV Mass AL Index 92.1 43 - 95 g/m2    E/E' lateral 12.17     E/E' septal 11.63     RVSP 35.8 mmHg    E/E' ratio (averaged) 11.90     Est. RA Pressure 3.0 mmHg    Mitral Valve E Wave Deceleration Time 198.0 ms    Mitral Valve Pressure Half-time 57.4 ms    Left Atrium Major Axis 4.72 cm    Triscuspid Valve Regurgitation Peak Gradient 32.8 mmHg    Pulmonic Valve Max Velocity 74.19 cm/s    TR Max Velocity 286.36 cm/s    LA Vol Index 41.77 16 - 28 ml/m2    PASP 35.8 mmHg    LA Vol Index 45.52 16 - 28 ml/m2    LA Vol Index 36.21 16 - 28 ml/m2    Left Ventricular Fractional Shortening by 2D 40.917859838 %    Mitral Valve Deceleration [place] [id number]     AV Velocity Ratio 0.64     PV peak gradient 2.2 mmHg       IMAGING:  MRI Results (most recent):  Results from Hospital Encounter encounter on 03/23/20   MRI BRAIN W WO CONT    Narrative EXAM:  MRI BRAIN W WO CONT  INDICATION:  Apraxia, R, bilateral tinnitus,, dizziness, headache, confusion,  TECHNIQUE:  Whole head sagittal T1, axial T2, T1, T2*and FLAIR images followed by thin 2 mm  axial T1-weighted images of the temporal bone and posterior fossa, then  intravenous infusion 10 mL Dotarem with repeat and axial and coronal T1-weighted  images. Axial diffusion weighted images and axial T2 cisternogram images were  obtained. Postgadolinium whole head T1. Postcontrast T1 volume acquisition with  thin sagittal axial and coronal reconstructed T1-weighted images  COMPARISON: 3/12/2020  FINDINGS:  There is an approximately 3.1 x 2.1 x 4.5 cm craniocaudal previously 3.9 x 2.2 x  4.9 cm area of abnormal signal and enhancement involving the right lateral  occipital and parietal lobes. Gyral T1 hyperintensity and heterogeneous and  gyral enhancement along with T2 hyperintensity. There is also a focus of  involvement in the right anterior superior lateral frontal cortex. Patient is  noted to have fetal origin to the right posterior cerebral artery. Flow voids  are present in vertebral basilar and carotid artery systems. Ventricular size  and configuration are normal.T1 hyperintensity consistent with subacute/chronic  infarction, otherwise no evidence of intracranial hemorrhage. No evidence of  abnormal extra-axial fluid collections. The cerebellopontine angle, internal auditory canals and temporal bones are  normal.   The craniocervical junction is unremarkable. Cerebellopontine angles  unremarkable. IACs are symmetric in size. Mildly enlarged partial empty sella incidentally noted. Paranasal sinuses  temporal bones and orbits are unremarkable. Impression IMPRESSION:   Typical evolution of subacute/chronic infarction to the right lateral occipital  and parietal lobes with a smaller area involving right frontal lobe. Diminished  FLAIR and associated diffusion abnormality. Assessment:     Encounter Diagnoses     ICD-10-CM ICD-9-CM   1. Cerebrovascular accident (CVA), unspecified mechanism ([facility] [address] I63.9 434.91   2. Essential thrombocytosis (HCC) D47.3 238.71   3. Cognitive impairment R41.89 294.9   4.  Visual field cut H53.40 39.40   62year old RHF with a h/o seizure d/o since childhood on PHT, thrombocytosis presenting with subacute difficulties with visual processing, cognitive impairment, slurred speech, dizziness, headaches. MRI Brain during 3/2020 admission revealed restricted diffusion and enhancement of the R parieto-occipital and R frontal territories suggestive of subacute ischemia. CTA H/N did not reveal any significant stenosis of LVO. Labs notable for thrombocytosis (724). Seen by hematology with +Jak2 now on hydroxyurea. MRI Brain repeated 3/23/20 showing typical evolution of infarct. Cognitive deficits and L visual field cut persist post hospitalization. She is out of work on short term disability due to above residual symptoms. Modified MOCA is 15/22 today suggestive of mild to moderate cognitive impairment. Will pursue formal neuropsychologic testing to establish a baseline. She denies new focal neurologic deficits since her hospitalization. Will continue current antiplatelet therapy for stroke prevention. Plan:   Continue ASA/statin therapy for stroke prevention  Hematology F/U for essential thrombocytosis  Neuropsychologic testing   No driving due to visual deficits and advise continued short term disability until cognitive testing is completed    Follow-up and Dispositions    · Return in about 3 months (around 7/15/2020).            Signed:  Es Henao DO  4/15/2020  9:11 AM

## 2020-04-28 ENCOUNTER — TELEPHONE (OUTPATIENT)
Dept: NEUROLOGY | Age: 55
End: 2020-04-28

## 2020-04-28 NOTE — TELEPHONE ENCOUNTER
----- Message from Gopal Mac sent at 4/28/2020  2:35 PM EDT -----  Regarding: dr orellana/ telephone  Patient return call    Caller's first and last name and relationship (if not the patient): pt      Best contact number(s): [phone]      Whose call is being returned:      Details to clarify the request: to schedule an appt       Deniz Mac

## 2020-04-29 ENCOUNTER — VIRTUAL VISIT (OUTPATIENT)
Dept: NEUROLOGY | Age: 55
End: 2020-04-29

## 2020-04-29 DIAGNOSIS — H53.40 VISUAL FIELD CUT: ICD-10-CM

## 2020-04-29 DIAGNOSIS — F41.9 ANXIETY AND DEPRESSION: ICD-10-CM

## 2020-04-29 DIAGNOSIS — F32.A ANXIETY AND DEPRESSION: ICD-10-CM

## 2020-04-29 DIAGNOSIS — G31.84 MILD COGNITIVE IMPAIRMENT WITH MEMORY LOSS: Primary | ICD-10-CM

## 2020-04-29 NOTE — PROGRESS NOTES
This note will not be viewable in [address] Pursuant to the emergency declaration under the [facility] waiver authority and the [employer] Act, this Virtual Visit was conducted, with appropriate consent obtained, to reduce the patient's risk of exposure to COVID-19 and provide continuity of care   Services were provided in this manner to substitute for in-person clinic visit. The originating site is the patient's home and the distance site is [facility] at [facility]. These types of teleneuropsychology/telehealth/telemedicine visits were authorized by the President of the [place], though I/we cannot guarantee what a third party payor will do reimbursement/coverage wise. I indicated that I would evaluate the patient and recommend diagnostics and treatment based on my assessment and impressions, and that our sessions are not being recorded and that personal health information is protected to the best of our abilities. [facility] at [address]    Office:  [phone]  Fax: [phone]                 Initial Office Exam    Patient Name: Jorge Grady  Age: 62 y.o. 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   The use and purpose of this examination, as well as the extent and limitations of confidentiality, were explained prior to obtaining permission to participate. Instructions were provided regarding the necessity to put forth optimal effort and answer questions truthfully in order to obtain reliable and accurate test results. PERTINENT HISTORY:  Ms. Johnice Osler presented for a neuropsychological assessment at the recommendation of her treating physician secondary to complaints of visual field deficit, slurred  speech, difficulty processing speech, and reading. MRI Brain this admission reveals restricted diffusion and enhancement of the R parieto-occipital and R frontal territories with rather confluent T2 hyperintensity in a similar distribution on FLAIR. She has reported a history of seizures since childhood. From a brief review of her medical and personal history there has not been any other significant neurological injury or illness noted or reported. She did report experiencing depression and anxiety currently. There is no history of psychological distress in the past.    Ms. Johnice Osler does not  report any problems at birth or difficulties meeting developmental milestones. She reports that she had an adequate level of family support and was not subject to trauma or abuse as a child. Ms. Johnice Osler does not  report being retain in school or receiving special assistance in any of she classes or subjects. Ms. Johnice Osler completed 12 years of education. Ms. Johnice Osler does  exercise on a regular basis and does  maintain a balanced diet. She does  report problems with sleep and does not  complain of pain. She does  participate in mentally stimulating activities. Ms. Johnice Osler does  have concerns  stressors. Ms. Johnice Osler indicated that she is independent in her instrumental activities of daily living, including shopping, meal preparation, housekeeping, doing laundry, driving a car, managing medications, and finances.       Current Outpatient Medications   Medication Sig    hydroxyurea (Hydrea) 500 mg capsule Take 1 Cap by mouth daily for 90 days.  aspirin (ASPIRIN) 325 mg tablet Take 1 Tab by mouth daily.  atorvastatin (LIPITOR) 80 mg tablet Take 1 Tab by mouth nightly.  OTHER [facility]    phenytoin ER (Dilantin Extended) 100 mg ER capsule Take 300 mg by mouth Daily (before breakfast). Take with Dilantin 30 mg capsule for total daily dose of 330 mg every morning    phenytoin ER (Dilantin) 30 mg ER capsule Take 30 mg by mouth Daily (before breakfast). Take with Dilantin 300 mg capsule for total daily dose of 330 mg every morning      No current facility-administered medications for this visit. 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     No data recorded    Past Surgical History:   Procedure Laterality Date    HX BREAST BIOPSY Left     NEG    HX  SECTION      x 2    HX HEENT      LASIK    HX KNEE ARTHROSCOPY Bilateral     HX KNEE REPLACEMENT Right 2018    HX ORTHOPAEDIC Left     TKR    HX ORTHOPAEDIC Left     [address]    HX ORTHOPAEDIC Right     RTH    HX OTHER SURGICAL      BIOPSY LEFT LOWER LEG(AGE 23)    HX TONSILLECTOMY      HX TUBAL LIGATION         Social History     Socioeconomic History    Marital status:      Spouse name: Not on file    Number of children: Not on file    Years of education: Not on file    Highest education level: Not on file   Tobacco Use    Smoking status: Never Smoker    Smokeless tobacco: Never Used   Substance and Sexual Activity    Alcohol use: Yes     Comment: <1/wk    Drug use: No       Family History   Problem Relation Age of Onset    Cancer Mother 48        breast cancer    Diabetes Mother     Anesth Problems Mother         PONV    Heart Disease Father     Hypertension Father     No Known Problems Brother        CT Results (most recent): MRI Results (most recent):  Results from [place] encounter on 03/23/20   MRI BRAIN W WO CONT    Narrative EXAM:  MRI BRAIN W WO CONT  INDICATION:  Apraxia, R, bilateral tinnitus,, dizziness, headache, confusion,  TECHNIQUE:  Whole head sagittal T1, axial T2, T1, T2*and FLAIR images followed by thin 2 mm  axial T1-weighted images of the temporal bone and posterior fossa, then  intravenous infusion 10 mL Dotarem with repeat and axial and coronal T1-weighted  images. Axial diffusion weighted images and axial T2 cisternogram images were  obtained. Postgadolinium whole head T1. Postcontrast T1 volume acquisition with  thin sagittal axial and coronal reconstructed T1-weighted images  COMPARISON: 3/12/2020  FINDINGS:  There is an approximately 3.1 x 2.1 x 4.5 cm craniocaudal previously 3.9 x 2.2 x  4.9 cm area of abnormal signal and enhancement involving the right lateral  occipital and parietal lobes. Gyral T1 hyperintensity and heterogeneous and  gyral enhancement along with T2 hyperintensity. There is also a focus of  involvement in the right anterior superior lateral frontal cortex. Patient is  noted to have fetal origin to the right posterior cerebral artery. Flow voids  are present in vertebral basilar and carotid artery systems. Ventricular size  and configuration are normal.T1 hyperintensity consistent with subacute/chronic  infarction, otherwise no evidence of intracranial hemorrhage. No evidence of  abnormal extra-axial fluid collections. The cerebellopontine angle, internal auditory canals and temporal bones are  normal.   The craniocervical junction is unremarkable. Cerebellopontine angles  unremarkable. IACs are symmetric in size.   Mildly enlarged partial empty sella incidentally noted. Paranasal sinuses  temporal bones and orbits are unremarkable. Impression IMPRESSION:   Typical evolution of subacute/chronic infarction to the right lateral occipital  and parietal lobes with a smaller area involving right frontal lobe. Diminished  FLAIR and associated diffusion abnormality. MENTAL STATUS:    Orientation: Fully oriented   Eye Contact: appropriate   Motor Behavior:  Ambulates independently   Speech:  Fluent and intelligible   Thought Process: Normal   Thought Content: No hallucinations or delusions   Suicidal ideations: denies   Mood:  Dysphoric with anxious mood   Affect:  Congruent with stated mood   Concentration:  Mildly impaired   Abstraction:  WNL   Insight:  adequate     On the Modified Mini-Mental Status Exam: 73/100 (<1%ile)  Could not draw a clock  Could not do alphanumeric sequencing    DIAGNOSTIC IMPRESSIONS:    ICD-10-CM ICD-9-CM    1. Mild cognitive impairment with memory loss G31.84 331.83      780.93    2. Anxiety and depression F41.9 300.00     F32.9 311    3. Visual field cut H53.40 368.40              PLAN:  1. Complete a comprehensive neuropsychological assessment to provide a differential diagnosis of presenting concerns as well as to assist with disposition and treatment planning as appropriate. 2. Consider compensatory and remedial cognitive training. 3. Consider nonpharmacological interventions for anxiety and depression. 4. Consider an adaptive driving evaluation. 67809 x 1 Review of records. Face to face interview w/ patient. Determine test protocol: 60 minutes. Total 1 unit        Brittney De La Rosa, PhD, ABPP, LCP  Licensed Clinical Psychologist/ Neuropsychologist        This note will not be viewable in [address]

## 2020-05-07 LAB
BASOPHILS # BLD AUTO: 0.1 X10E3/UL (ref 0–0.2)
BASOPHILS NFR BLD AUTO: 1 %
EOSINOPHIL # BLD AUTO: 0.1 X10E3/UL (ref 0–0.4)
EOSINOPHIL NFR BLD AUTO: 3 %
ERYTHROCYTE [DISTWIDTH] IN BLOOD BY AUTOMATED COUNT: 13.6 % (ref 11.7–15.4)
HCT VFR BLD AUTO: 36.2 % (ref 34–46.6)
HGB BLD-MCNC: 12.4 G/DL (ref 11.1–15.9)
IMM GRANULOCYTES # BLD AUTO: 0 X10E3/UL (ref 0–0.1)
IMM GRANULOCYTES NFR BLD AUTO: 0 %
LYMPHOCYTES # BLD AUTO: 1.4 X10E3/UL (ref 0.7–3.1)
LYMPHOCYTES NFR BLD AUTO: 25 %
MCH RBC QN AUTO: 33.2 PG (ref 26.6–33)
MCHC RBC AUTO-ENTMCNC: 34.3 G/DL (ref 31.5–35.7)
MCV RBC AUTO: 97 FL (ref 79–97)
MONOCYTES # BLD AUTO: 0.5 X10E3/UL (ref 0.1–0.9)
MONOCYTES NFR BLD AUTO: 8 %
NEUTROPHILS # BLD AUTO: 3.6 X10E3/UL (ref 1.4–7)
NEUTROPHILS NFR BLD AUTO: 63 %
PLATELET # BLD AUTO: 821 X10E3/UL (ref 150–450)
RBC # BLD AUTO: 3.73 X10E6/UL (ref 3.77–5.28)
SPECIMEN STATUS REPORT, ROLRST: NORMAL
WBC # BLD AUTO: 5.7 X10E3/UL (ref 3.4–10.8)

## 2020-05-08 ENCOUNTER — TELEPHONE (OUTPATIENT)
Dept: ONCOLOGY | Age: 55
End: 2020-05-08

## 2020-05-08 DIAGNOSIS — D47.3 ESSENTIAL THROMBOCYTOSIS (HCC): ICD-10-CM

## 2020-05-08 RX ORDER — HYDROXYUREA 500 MG/1
1000 CAPSULE ORAL DAILY
Qty: 180 CAP | Refills: 1 | Status: SHIPPED | OUTPATIENT
Start: 2020-05-08 | End: 2020-08-06

## 2020-05-08 NOTE — TELEPHONE ENCOUNTER
Called pt. HIPAA verified by two patient identifiers. Pt aware. Will mail lab slips out also.     VORB FROM DR Julieta Lemus HYDROXYUREA (HYDREA) 500MG CAP TAKE 2 CAP BY MOUTH ONCE DAILY DISPENSE 180 R-1    VORB FROM DR HINOJOSA CBC WITH DIFF

## 2020-05-08 NOTE — TELEPHONE ENCOUNTER
----- Message from Ike Arnold MD sent at 5/7/2020 10:48 AM EDT -----  Increase Hydrea to 1000 mg/day and repeat CBC in 1 month at [facility].

## 2020-05-09 DIAGNOSIS — D47.3 ESSENTIAL THROMBOCYTOSIS (HCC): ICD-10-CM

## 2020-06-08 DIAGNOSIS — D47.3 ESSENTIAL THROMBOCYTOSIS (HCC): ICD-10-CM

## 2020-06-18 LAB
BASOPHILS # BLD AUTO: 0 X10E3/UL (ref 0–0.2)
BASOPHILS NFR BLD AUTO: 1 %
EOSINOPHIL # BLD AUTO: 0.1 X10E3/UL (ref 0–0.4)
EOSINOPHIL NFR BLD AUTO: 2 %
ERYTHROCYTE [DISTWIDTH] IN BLOOD BY AUTOMATED COUNT: 14.8 % (ref 11.7–15.4)
HCT VFR BLD AUTO: 38 % (ref 34–46.6)
HGB BLD-MCNC: 12.9 G/DL (ref 11.1–15.9)
IMM GRANULOCYTES # BLD AUTO: 0 X10E3/UL (ref 0–0.1)
IMM GRANULOCYTES NFR BLD AUTO: 1 %
LYMPHOCYTES # BLD AUTO: 1 X10E3/UL (ref 0.7–3.1)
LYMPHOCYTES NFR BLD AUTO: 20 %
MCH RBC QN AUTO: 34 PG (ref 26.6–33)
MCHC RBC AUTO-ENTMCNC: 33.9 G/DL (ref 31.5–35.7)
MCV RBC AUTO: 100 FL (ref 79–97)
MONOCYTES # BLD AUTO: 0.5 X10E3/UL (ref 0.1–0.9)
MONOCYTES NFR BLD AUTO: 10 %
NEUTROPHILS # BLD AUTO: 3.5 X10E3/UL (ref 1.4–7)
NEUTROPHILS NFR BLD AUTO: 66 %
PLATELET # BLD AUTO: 540 X10E3/UL (ref 150–450)
RBC # BLD AUTO: 3.79 X10E6/UL (ref 3.77–5.28)
SPECIMEN STATUS REPORT, ROLRST: NORMAL
WBC # BLD AUTO: 5.3 X10E3/UL (ref 3.4–10.8)

## 2020-06-18 NOTE — TELEPHONE ENCOUNTER
Called pt. HIPAA verified by two patient identifiers. Pt aware of results. She will need an appt. In mid to late July but does not need labs at that time.  And she is able to do a virtual.

## 2020-06-24 ENCOUNTER — OFFICE VISIT (OUTPATIENT)
Dept: NEUROLOGY | Age: 55
End: 2020-06-24

## 2020-06-24 DIAGNOSIS — R41.89 COGNITIVE IMPAIRMENT: ICD-10-CM

## 2020-06-24 DIAGNOSIS — F32.A ANXIETY AND DEPRESSION: Primary | ICD-10-CM

## 2020-06-24 DIAGNOSIS — F41.9 ANXIETY AND DEPRESSION: Primary | ICD-10-CM

## 2020-06-24 NOTE — PROGRESS NOTES
This note will not be viewable in [address] [facility] at [address]    Office:  [phone]  Fax: [phone]                                             Neuropsychological Evaluation Report    Patient Name: García Carpenter  Age: 62 y.o. 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 The use and purpose of this examination, as well as the extent and limitations of confidentiality, were explained prior to obtaining permission to participate. Instructions were provided regarding the necessity to put forth optimal effort and answer questions truthfully in order to obtain reliable and accurate test results.     PERTINENT HISTORY:  Ms. Lizett Prajapati presented for a neuropsychological assessment at the recommendation of her treating physician secondary to complaints of visual field deficit, slurred  speech, difficulty processing speech, and reading and was seen in the ED in March of this year with subacute right hemispheric infarction. MRI Brain this admission reveals restricted diffusion and enhancement of the R parieto-occipital and R frontal territories. She has reported a history of seizures since childhood. From a brief review of her medical and personal history there has not been any other significant neurological injury or illness noted or reported. She did report experiencing depression and anxiety currently.   There is no history of psychological distress in the past.     Ms. Sorensen does not  report any problems at birth or difficulties meeting developmental milestones. She reports that she had an adequate level of family support and was not subject to trauma or abuse as a child. Ms. Iman Rojas does not  report being retain in school or receiving special assistance in any of she classes or subjects. Ms. Iman Rojas completed 12 years of education.     Ms. Sorensen does  exercise on a regular basis and does  maintain a balanced diet. She does  report problems with sleep and does not  complain of pain. She does  participate in mentally stimulating activities. Ms. Iman Rojas does  have concerns  stressors. Ms. Iman Rojas indicated that she is independent in her instrumental activities of daily living, including shopping, meal preparation, housekeeping, doing laundry, driving a car, managing medications, and finances.       METHODS OF ASSESSMENT (Current Evaluation):  Clinician Administered:  Clinical Interview  Review of Medical Records  Clock Drawing Task  Modified Mini-Mental Status Exam (3MS)  Test of Premorbid Functioning  Personality Assessment [address] [place] [address] Anxiety and Depression Scale  Revised Memory and Behavior Checklist    Technician Administered:        Finger Oscillation Test  Neuropsychological Assessment Battery   NAB: Judgment, Visual Discrimination, Design Construction   RBANS-B  Test of Memory Malingering  Test of Practical Judgment (9-Item)  [place] Functional Living Scale  Trail Making Test  Wide Range Achievement Test-4  Word Choice Effort Test (ACS)    TEST OBSERVATIONS:  Ms. Iman Rojas arrived promptly for the testing session. Dress and grooming were appropriate; physical presentation was unchanged from that observed during the clinical interview. Speech was fluent, intelligible, and goal-directed. Affect was congruent with the euthymic mood conveyed. Ms. Iman Rojas was adequately cooperative and appeared to put forth good effort throughout this examination.   Rapport with the examiner was adequately established and maintained. Minimal prompting was required. Comprehension of test instructions was not problematic. Performance motivation was objectively measured by four instruments (TOMM, WC Effort, Reliable Digit Span, RBANS EI), and Ms. Sorensen did not produce a normal score on any of these measures. Accordingly, test findings below cannot be considered to be a valid and reliable reflection of her true ability. This can occur for a variety of reasons and should not necessarily considered a disingenuous attempt to distort her clinical picture. TEST RESULTS:  Quantitative test results are derived from comparisons to age and education corrected cohort normative data, where applicable. Percentiles are included in these instances. Qualitative test results are determined using clinical observations. General Orientation and Awareness:       Orientation to person, year, month, day of month, day of week, state, town, and circumstance.    Awareness of deficits: Aware                  Cognitive performance validity testing: INVALID      Sensory-Perceptual and Motor Functioning:    Classification:  Simple fine motor speed  right dominant hand (27 percentile)       Average  Simple fine motor speed  left nondominant hand (14 percentile)    Low Average    Attention/Concentration:       Digit span (<0.1 percentile)                 Severely Impaired  Coding (1 percentile)           Severely Impaired  Simple visuomotor tracking (<0.1 percentile)               Severely Impaired  Digits forward (<1 percentile)                 Severely Impaired  Digits backward (<1 percentile)                 Severely Impaired    Visuospatial and Constructional Praxis:     Figure copy (<0.1 percentile)                                       Severely Impaired  Line orientation (1 percentile)                                                  Severely Impaired   Visual discrimination (62 percentile) Average   Design construction (<1 percentile)                Severely Impaired    Language:         Picture naming (75 percentile)                               High Average  Semantic fluency (58 percentile)                    Average    Memory and Learning:       Immediate word list learning (0.4 percentile)               Severely Impaired  Delayed word list recall (1 percentile)                  Severely Impaired  Delayed word list recognition (<0.1 percentile)               Severely Impaired  Immediate story memory (2 percentile)                  Moderately Impaired  Delayed story recall (<0.1 percentile)                Severely Impaired  Delayed figure recall (1 percentile)                Severely Impaired    Cognitive Tests of Executive Functioning:     Ability to think flexibly, Trail Making B (<0.1 percentile)              Severely Impaired  Simple judgment in daily decision making (38 percentile)             Average  Complex judgment in daily decision making (5 percentile)              Mildly Impaired    Wide Range Achievement Test        SS       Percentile   Word Readin   36   Sentence Comprehension:  94   35   Spellin   52   Math Computation:   66   1   Reading Composite:   94   35    Adaptive Behavioral Measure of Daily Functioning:   Time:     9 %ile   Money/calculations:    25 %ile  Communication:    9 %ile   Memory:     9 %ile    Total:     T= 30 (2%ile)    Emotional Functioning:   HADS:      Depression =   07,  Normal range           Anxiety =  07,  Normal range     AKIL:  Ms Sudarshan Ridley was administered the Personality Assessment Inventory in an effort to ascertain her level of emotional functioning at the time of this evaluation. Unfortunately, Ms. Sudarshan Ridley responses to items with similar content was highly inconsistent and a valid and reliable profile could not be obtained.     IMPRESSIONS:  Ms Sudarshan Ridley was seen for a comprehensive neuropsychological evaluation and administered a battery assessing the neurocognitive domains of attention, visual-spatial, language, memory, and executive functioning. Her performance on measures of validity and reliability strongly indicate that the findings of this evaluation are not considered reliable or valid. It should not be assumed that Ms. Sorensen intentionally attempted to distort her clinical picture as there a variety of factors that could have this end result to include carelessness, reading difficulties, confusion, exaggeration, malingering or defensiveness. This evaluation may be attempted again, if desired, following a 12 month period. DIAGNOSTIC IMPRESSIONS:    ICD-10-CM ICD-9-CM    1. Anxiety and depression F41.9 300.00 LA NEUROPSYCHOLOGICAL TST EVAL PHYS/QHP 1ST HOUR    F32.9 311 LA NEUROPSYCHOLOGICAL TST EVAL PHYS/QHP EA ADDL HR      LA PSYL/NRPSYCL TST PHYS/QHP 2+ TST 1ST 30 MIN      LA PSYCL/NRPSYCL TST PHYS/QHP 2+ TST EA ADDL 30 MIN   2. Cognitive impairment R41.89 294.9 LA NEUROPSYCHOLOGICAL TST EVAL PHYS/QHP 1ST HOUR      LA NEUROPSYCHOLOGICAL TST EVAL PHYS/QHP EA ADDL HR      LA PSYL/NRPSYCL TST PHYS/QHP 2+ TST 1ST 30 MIN      LA PSYCL/NRPSYCL TST PHYS/QHP 2+ TST EA ADDL 30 MIN      LA PSYCL/NRPSYCL TST TECH 2+ TST 1ST 30 MIN      LA PSYCL/NRPSYCL TST TECH 2+ TST EA ADDL 30 MIN      LA PSYCL/NRPSYCL TST TECH 2+ TST EA ADDL 30 MIN      LA PSYCL/NRPSYCL TST TECH 2+ TST EA ADDL 30 MIN      LA PSYCL/NRPSYCL TST TECH 2+ TST EA ADDL 30 MIN      LA PSYCL/NRPSYCL TST TECH 2+ TST EA ADDL 30 MIN         RECOMMENDATIONS:   1. Findings should be reviewed with Ms. Berta Perez to insure her understanding and discuss the potential implications. 2. Emphasis should be placed on Ms. Sorensen obtaining good sleep hygiene and maintaining adequate physical exercise to promote good brain health. 3. Ms. Berta Perez may benefit from a referral to psychotherapy to address her depression and anxiety and work on adequate stress management skills.   4. Ms. Edu is encouraged to seek out various forms of mental stimulation that would help to \"exercise\" her brain. This might include learning a new skill, hobby, attending lectures, or evening reading or listening to podcasts or videos on topics of her interest.      Thank you for allowing me the opportunity to assist you in Ms. Sorensen's care. Please do not hesitate to contact me should you have additional questions that I may not have addressed. 90783 x 1  96138 x 1  96139 x 6  96132 x 1  96133 x 2          Bobbi Og, Ph.D., ABPP  Licensed Clinical Psychologist  Neuropsychologist  Board Certified Rehabilitation Psychologist      Time Documentation:     59716 x 1: Neurobehavioral Status Exam/Clinical Interview: (1 hour, (already billed on first date of service)     63446*8 Neuropsych testing/data gathering by Neuropsychologist (35 additional minutes, see above)      96138 x 1  96139 x 6 Test Administration/Data Gathering By Technician: (3.5 hours). 75308 x 1 (first 30 minutes), 14299 x 7 (each additional 30 minutes)     96132 x 1  96133 x 1 Testing Evaluation Services by Neuropsychologist (1 hour, 50 minutes) 96132 x 1 (first hour), 96133 x 1 (50 minutes)     Definitions:       42753/08446:  Neurobehavioral Status Exam, Clinical interview. Clinical assessment of thinking, reasoning and judgment, by neuropsychologist, both face to face time with patient and time interpreting those test results and reporting, first and subsequent hours)     86478/63366: Neuropsychological Test Administration by Technician/Psychometrist, first 30 minutes and each additional 30 minutes. The above includes: Record review. Review of history provided by patient. Review of collaborative information. Testing by Clinician. Review of raw data. Scoring. Report writing of individual tests administered by Clinician.   Integration of individual tests administered by psychometrist with NSE/testing by clinician, review of records/history/collaborative information, case Conceptualization, treatment planning, clinical decision making, report writing, coordination Of Care. Psychometry test codes as time spent by psychometrist administering and scoring neurocognitive/psychological tests under supervision of neuropsychologist.  Integral services including scoring of raw data, data interpretation, case conceptualization, report writing etcetera were initiated after the patient finished testing/raw data collected and was completed on the date the report was signed. This note will not be viewable in [address]

## 2020-07-01 ENCOUNTER — HOSPITAL ENCOUNTER (OUTPATIENT)
Dept: MRI IMAGING | Age: 55
Discharge: HOME | End: 2020-07-01
Payer: COMMERCIAL

## 2020-07-01 ENCOUNTER — VIRTUAL VISIT (OUTPATIENT)
Dept: NEUROLOGY | Age: 55
End: 2020-07-01

## 2020-07-01 DIAGNOSIS — I63.9 CEREBROVASCULAR ACCIDENT (CVA), UNSPECIFIED MECHANISM (HCC): ICD-10-CM

## 2020-07-01 DIAGNOSIS — M54.12 CERVICAL RADICULOPATHY: ICD-10-CM

## 2020-07-01 DIAGNOSIS — G31.84 MILD COGNITIVE IMPAIRMENT WITH MEMORY LOSS: Primary | ICD-10-CM

## 2020-07-01 PROCEDURE — 72156 MRI NECK SPINE W/O & W/DYE: CPT

## 2020-07-01 PROCEDURE — 74011250636 HC RX REV CODE- 250/636

## 2020-07-01 PROCEDURE — A9575 INJ GADOTERATE MEGLUMI 0.1ML: HCPCS

## 2020-07-01 RX ORDER — GADOTERATE MEGLUMINE 376.9 MG/ML
20 INJECTION INTRAVENOUS
Status: COMPLETED | OUTPATIENT
Start: 2020-07-01 | End: 2020-07-01

## 2020-07-01 RX ADMIN — GADOTERATE MEGLUMINE 20 ML: 376.9 INJECTION INTRAVENOUS at 09:20

## 2020-07-01 NOTE — PROGRESS NOTES
This note will not be viewable in [address] Pursuant to the emergency declaration under the [facility], 1135 waiver authority and the [employer] and [employer] Act, this Virtual Visit was conducted, with appropriate consent obtained, to reduce the patient's risk of exposure to COVID-19 and provide continuity of care   Services were provided in this manner to substitute for in-person clinic visit. The originating site is the patient's home and the distance site is [facility] [facility]. These types of teleneuropsychology/telehealth/telemedicine visits were authorized by the President of the [employer], though I/we cannot guarantee what a third party payor will do reimbursement/coverage wise. I indicated that I would evaluate the patient and recommend diagnostics and treatment based on my assessment and impressions, and that our sessions are not being recorded and that personal health information is protected to the best of our abilities. Avel Welch is a 62 y.o. female who presents today for feedback following recent neuropsychological testing. The results were reviewed of the recent neuropsychological evaluation. It was explained to her that her performance on the test battery was judged to be invalid based on validity indicators administered during the assessment. As such, there is not other feedback to provide to her. She was quite emotionally distraught, as she as she is attempting to qualify for disability. I explained to her that Dr. Megha Varela ahs the clinical notes of Physical Therapy, Occupational therapy, MRI results, as well as her own clinical documentation that she could use to support her case. The Ms. Sorensen is encouraged to follow-up with the referring provider.     DIAGNOSTIC IMPRESSIONS:    ICD-10-CM ICD-9-CM    1. Mild cognitive impairment with memory loss G31.84 331.83 Neuropsychological evaluation was not valid   2.  Cerebrovascular accident (CVA), unspecified mechanism ([facility] I63.9 434.91        [id number] 30 minutes x 1    Shelby Mittal, PHD

## 2020-07-07 ENCOUNTER — TELEPHONE (OUTPATIENT)
Dept: NEUROLOGY | Age: 55
End: 2020-07-07

## 2020-07-07 NOTE — TELEPHONE ENCOUNTER
Pt's occupational therapist calling to discuss pt. She would not give me anymore information.  Please call

## 2020-07-07 NOTE — TELEPHONE ENCOUNTER
Spoke with Waylon Nguyen OT, she wants to know if  would like her notes. Informed her she could send them, provided fax number.

## 2020-07-15 ENCOUNTER — TELEPHONE (OUTPATIENT)
Dept: NEUROLOGY | Age: 55
End: 2020-07-15

## 2020-07-22 ENCOUNTER — TELEPHONE (OUTPATIENT)
Dept: NEUROLOGY | Age: 55
End: 2020-07-22

## 2020-07-22 ENCOUNTER — TELEPHONE (OUTPATIENT)
Dept: ONCOLOGY | Age: 55
End: 2020-07-22

## 2020-07-22 ENCOUNTER — VIRTUAL VISIT (OUTPATIENT)
Dept: NEUROLOGY | Age: 55
End: 2020-07-22

## 2020-07-22 ENCOUNTER — VIRTUAL VISIT (OUTPATIENT)
Dept: ONCOLOGY | Age: 55
End: 2020-07-22

## 2020-07-22 DIAGNOSIS — R41.3 COMPLAINTS OF MEMORY DISTURBANCE: ICD-10-CM

## 2020-07-22 DIAGNOSIS — R56.9 SEIZURE IN CHILDHOOD (HCC): ICD-10-CM

## 2020-07-22 DIAGNOSIS — H53.40 VISUAL FIELD CUT: ICD-10-CM

## 2020-07-22 DIAGNOSIS — D47.3 ESSENTIAL THROMBOCYTOSIS (HCC): Primary | ICD-10-CM

## 2020-07-22 DIAGNOSIS — D47.3 ESSENTIAL THROMBOCYTOSIS (HCC): ICD-10-CM

## 2020-07-22 DIAGNOSIS — I63.9 CEREBROVASCULAR ACCIDENT (CVA), UNSPECIFIED MECHANISM (HCC): Primary | ICD-10-CM

## 2020-07-22 NOTE — TELEPHONE ENCOUNTER
Pt. Calling back, said she doesn't always have reception but will try to stay somewhere she does tomorrow morning.

## 2020-07-22 NOTE — PROGRESS NOTES
[facility]  [address], [address] [facility] [address], [address]  [phone]       Progress Note      Patient: Ela Schroeder MRN: [id number]  SSN: [id number]    YOB: 1965  Age: 60 y.o. Sex: female        Reason for Visit:   Ela Schroeder is a 60 y.o. female who is seen by synchronous (real-time) audio-video technology for follow up of essential thrombocytosis. Subjective:      Ela Schroeder is a 60 y.o. female who I am seeing in follow up for thrombocytosis. She is admitted with subacute difficulties with visual processing, cognitive impairment, slurred speech, dizziness, headaches x 2-3 weeks. MRI Brain this admission reveals restricted diffusion and enhancement of the R parieto-occipital and R frontal territories with rather confluent T2 hyperintensity in a similar distribution on FLAIR. CTA H/N did not reveal any significant stenosis of LVO. She is taking Hydroxyurea and denies any side effects.        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    Seizures ([address]     since age 1 last seizure ; on dilantin (drug level checked annually by PCP)    Stroke ([address]     Thrombocytosis ([address] 2019     Past Surgical History:   Procedure Laterality Date    HX BREAST BIOPSY Left     NEG    HX  SECTION      x 2    HX HEENT      LASIK    HX KNEE ARTHROSCOPY Bilateral     HX KNEE REPLACEMENT Right 04/2018    HX ORTHOPAEDIC Left 2013    TKR    HX ORTHOPAEDIC Left 2016    [facility]    HX ORTHOPAEDIC Right     RTH    HX OTHER SURGICAL      BIOPSY LEFT LOWER LEG(AGE 23)    HX TONSILLECTOMY      HX TUBAL LIGATION        Family History   Problem Relation Age of Onset    Cancer Mother 48        breast cancer    Diabetes Mother     [de-identified] Problems Mother         PONV    Heart Disease Father     Hypertension Father     No Known Problems Brother      Social History     Tobacco Use    Smoking status: Never Smoker    Smokeless tobacco: Never Used   Substance Use Topics    Alcohol use: Yes     Comment: <1/wk      Prior to Admission medications    Medication Sig Start Date End Date Taking? Authorizing Provider   hydroxyurea (Hydrea) 500 mg capsule Take 2 Caps by mouth daily for 90 days. 5/8/20 8/6/20 Yes Trina Mcgovern MD   aspirin (ASPIRIN) 325 mg tablet Take 1 Tab by mouth daily. 3/15/20  Yes Dae Gr MD   atorvastatin (LIPITOR) 80 mg tablet Take 1 Tab by mouth nightly. 3/15/20  Yes Dae Gr MD   OTHER Outpatient Vision Occupational Therapy 3/15/20  Yes Dae Gr MD   phenytoin ER (Dilantin Extended) 100 mg ER capsule Take 300 mg by mouth Daily (before breakfast). Take with Dilantin 30 mg capsule for total daily dose of 330 mg every morning   Yes Provider, Historical   phenytoin ER (Dilantin) 30 mg ER capsule Take 30 mg by mouth Daily (before breakfast).  Take with Dilantin 300 mg capsule for total daily dose of 330 mg every morning    Yes Provider, Historical              No Known Allergies        Objective:     General: alert, cooperative, no distress   Mental  status: normal mood, behavior, speech, dress, motor activity, and thought processes, able to follow commands   HENT: NCAT   Neck: no visualized mass   Resp: no respiratory distress   Neuro: no gross deficits   Skin: no discoloration or lesions of concern on visible areas   Psychiatric: normal affect, consistent with stated mood, no evidence of hallucinations       Due to this being a TeleHealth evaluation (During [address] emergency), many elements of the physical examination are unable to be assessed. Evaluation of the following organ systems was limited: Vitals/Constitutional/EENT/Resp/CV/GI//MS/Neuro/Skin/Heme-Lymph-Imm. Lab Results   Component Value Date/Time    WBC 5.3 06/17/2020 12:00 AM    HGB 12.9 06/17/2020 12:00 AM    HCT 38.0 06/17/2020 12:00 AM    PLATELET 686 (H) 64/38/7270 12:00 AM     (H) 06/17/2020 12:00 AM             Assessment:     1. Essential Thrombocytosis    Solomon 2 [id number] +ve  High risk   On ASA  Recent CVA. On Hydroxyurea 100 mg daily  Tolerating well  Platelet count is coming down      Plan:        1. Continue Hydrea 1000 mg /day   2. Continue ASA  3. CBC in 3 month  4. RTC in 3 months      Signed by: London Ramirez MD                     July 22, 2020      CC. Ronny Mallory MD        I was in the office while conducting this encounter. The patient was at her home. Consent:  She and/or her healthcare decision maker is aware that this patient-initiated Telehealth encounter is a billable service, with coverage as determined by her insurance carrier. She is aware that she may receive a bill and has provided verbal consent to proceed: Yes    Pursuant to the emergency declaration under the [address] and the [facility], 1135 waiver authority and the [employer] Act, this Virtual  Visit was conducted, with patient's (and/or legal guardian's) consent, to reduce the patient's risk of exposure to COVID-19 and provide necessary medical care. Services were provided through a video synchronous discussion virtually to substitute for in-person visit.

## 2020-07-22 NOTE — PROGRESS NOTES
Rosa Mei is a 60 y.o. female here for follow up for:  Chief Complaint   Patient presents with    Thrombocytosis    Medication Evaluation     hydrea       1. Have you been to the ER, urgent care clinic since your last visit? Hospitalized since your last visit? no    2. Have you seen or consulted any other health care providers outside of the [address] [place] [address] since your last visit? Include any pap smears or colon screening. no    Last labs done 6/17/20  Pt states everything has been fine since last visit. No concerns brought up.

## 2020-07-22 NOTE — TELEPHONE ENCOUNTER
Lenore Tinoco calling stating that the pt called her, she is the pt's speech therapist. She said the pt wanted her to fax records to Dr Dianne Tovar but if Dr Dianne Tovar would like her notes she would be more than willing to fax them but would like something from Dr Dianne Tovar requesting them (pt does not have to sign it). Can call if you have any questions.      Fax: [phone]

## 2020-07-22 NOTE — PROGRESS NOTES
Neurology Clinic Follow up Note    Patient ID:  Ko Sensor  [id number]  60 y.o.  1965      Ms. Sorensen is here for follow up today of cognitive complaints. Last Appointment With Me:  Hospital F/U, last seen 4/15/20    This is a telemedicine visit that was performed with the originating site at [facility] and the distant site at patient's home. Verbal consent to participate in video visit was obtained. The patient was identified by name and date of birth. This visit occurred during the Coronavirus [phone]) [facility] Emergency. I discussed with the patient the nature of our telemedicine visits, that:     I would evaluate the patient and recommend diagnostics and treatments based on my assessment   Our sessions are not being recorded and that personal health information is protected   Our team would provide follow up care in person if/when the patient needs it    \"62 y.o.  right handed  female, we were asked to see for stroke, vision deficit. PMH notable for seizure d/o since childhood on PHT, thrombocytosis. She is admitted due to recent abnormal MRI findings. Imaging was ordered by her PCP after patient reported difficulty with new onset headaches x 3 weeks, slurred speech, difficulty with reading/visual perception. According to the patient, she noted bi-frontal non-migrainous headaches over the past 3 weeks, slurred speech 1 week ago, difficulty processing words on her computer screen and reading after returning to work this past Monday (was out following cervical decompression). She also noted difficulty balancing her checkbook for the past couple weeks. She endorses some blurred vision and noted an episode of dizziness/vertigofollowing her surgery several weeks ago without headaches. She reports a h/o seizures since age 1 presenting as visual blindness bilaterally, preserved consciousness on PHT. She denies any recent typical seizure activity.   MRI Brain this admission reveals restricted diffusion and enhancement of the R parieto-occipital and R frontal territories with rather confluent T2 hyperintensity in a similar distribution on FLAIR. CTA H/N did not reveal any significant stenosis of LVO. Labs are notable for thrombocytosis (724), . \"    Interval History:   Patient reports she is still having some confusion, difficulty with multitasking/problem solving. She completed neuropsychologic testing which was invalid due to inconsistencies during testing per Dr. Pavithra Nelson. She is quite emotional at times. She endorses some inattention as well. L VF deficit is unchanged. Denies new focal weakness, numbness, speech deficits. She is compliant with ASA/statin therapy. She is engaged in outpatient OT 1x weekly. Saw hematology +Jak2 with recommendations for hydroxyurea. MRI Brain repeated 3/23/20 showing reported typical evolution of subacute infarct R lateral occipital/parietal lobes. PMHx/ PSHx/ FHx/ SHx:  Reviewed and unchanged previous visit. Past Medical History:   Diagnosis Date    Arthritis     Chronic pain     bilateral hips and right knee    Morbid obesity with BMI of 45.0-49.9, adult ([address] 4/20/2016    Motion sickness     when not eating    MRSA carrier 9/13/2019    Seizures ([address]     since age 1 last seizure 2006; on dilantin (drug level checked annually by PCP)    Stroke ([facility]     Thrombocytosis ([facility] 9/12/2019         ROS:  Comprehensive review of systems negative except for as noted above. Objective:       Meds:  Current Outpatient Medications   Medication Sig Dispense Refill    hydroxyurea (Hydrea) 500 mg capsule Take 2 Caps by mouth daily for 90 days. 180 Cap 1    aspirin (ASPIRIN) 325 mg tablet Take 1 Tab by mouth daily. 30 Tab 0    atorvastatin (LIPITOR) 80 mg tablet Take 1 Tab by mouth nightly.  30 Tab 0    OTHER Outpatient [facility] Occupational Therapy 1 Units 0    phenytoin ER (Dilantin Extended) 100 mg ER capsule Take 300 mg by mouth Daily (before breakfast). Take with Dilantin 30 mg capsule for total daily dose of 330 mg every morning      phenytoin ER (Dilantin) 30 mg ER capsule Take 30 mg by mouth Daily (before breakfast). Take with Dilantin 300 mg capsule for total daily dose of 330 mg every morning          Exam:  Due to this being a TeleHealth evaluation, many elements of the physical examination are unable to be assessed. NEUROLOGICAL EXAM:  General: Awake, alert, speech fluent. Oriented to month/year, knows POTUS. Difficulty with serial 7's. CN: EOMI, L VF cut, facial strength/sensation normal and symmetric, hearing is intact  Motor: AG x 4  Reflexes: deferred  Coordination: No ataxia. Sensation: LT intact throughout  Gait: Deferred    LABS  Results for orders placed or performed in visit on 05/08/20   CBC WITH AUTOMATED DIFF   Result Value Ref Range    WBC 5.3 3.4 - 10.8 x10E3/uL    RBC 3.79 3.77 - 5.28 x10E6/uL    HGB 12.9 11.1 - 15.9 g/dL    HCT 38.0 34.0 - 46.6 %     (H) 79 - 97 fL    MCH 34.0 (H) 26.6 - 33.0 pg    MCHC 33.9 31.5 - 35.7 g/dL    RDW 14.8 11.7 - 15.4 %    PLATELET 919 (H) 365 - 450 x10E3/uL    NEUTROPHILS 66 Not Estab. %    Lymphocytes 20 Not Estab. %    MONOCYTES 10 Not Estab. %    EOSINOPHILS 2 Not Estab. %    BASOPHILS 1 Not Estab. %    ABS. NEUTROPHILS 3.5 1.4 - 7.0 x10E3/uL    Abs Lymphocytes 1.0 0.7 - 3.1 x10E3/uL    ABS. MONOCYTES 0.5 0.1 - 0.9 x10E3/uL    ABS. EOSINOPHILS 0.1 0.0 - 0.4 x10E3/uL    ABS. BASOPHILS 0.0 0.0 - 0.2 x10E3/uL    IMMATURE GRANULOCYTES 1 Not Estab. %    ABS. IMM. GRANS. 0.0 0.0 - 0.1 x10E3/uL   SPECIMEN STATUS REPORT   Result Value Ref Range    SPECIMEN STATUS REPORT COMMENT        IMAGING:  MRI Results (most recent):  Results from Hospital Encounter encounter on 07/01/20   MRI CERV SPINE W WO CONT    Narrative EXAM:  MRI CERV SPINE W WO CONT    INDICATION:   Cervical radiculopathy. COMPARISON: MR cervical spine 3/13/2020.     TECHNIQUE: Multiplanar multisequence acquisition without and with contrast of  the cervical spine. The study was performed on an open configuration low field  strength MR imaging system. CONTRAST: 20 cc Dotarem. FINDINGS:  Evaluation is limited by motion. Stable postsurgical changes of C5-C6 ACDF. There is no evidence of abnormal  intraspinal or osseous enhancement. There is normal alignment of the cervical  spine. Vertebral body heights are maintained without evidence of acute fracture. Marrow signal is normal. Mild degenerative changes as below, unchanged from  prior exam. The cervical cord is normal in size and signal. Region of the  foramen magnum is unremarkable. Visualized soft tissues are unremarkable. C2-C3: Severe right facet arthropathy. No significant disc herniation, spinal  canal or neural foraminal stenosis. C3-C4: Small central disc osteophyte protrusion. No significant spinal canal or  neural foraminal stenosis. C4-C5: Small central disc osteophyte protrusion. No significant spinal canal or  neural foraminal stenosis. C5-C6: Status post ACDF. No significant spinal canal or neural foraminal  stenosis. C6-C7: No significant disc herniation, spinal canal or neural foraminal  stenosis. C7-T1: Mild bilateral facet arthropathy. No significant disc herniation, spinal  canal or neural foraminal stenosis. Impression IMPRESSION:    1. Stable postsurgical changes of C5-C6 ACDF without complication. 2. Stable mild degenerative changes as above without significant spinal canal or  neural foraminal stenosis at any level. Assessment:     Encounter Diagnoses     ICD-10-CM ICD-9-CM   1. Cerebrovascular accident (CVA), unspecified mechanism ([facility]  I63.9 434.91   2. Visual field cut  H53.40 368.40   3.  Essential thrombocytosis (HCC)  D47.3 238.71      60year old RHF with a h/o seizure d/o since childhood on PHT, thrombocytosis presenting with subacute difficulties with visual processing, cognitive impairment, slurred speech, dizziness, headaches. MRI Brain during 3/2020 admission revealed restricted diffusion and enhancement of the R parieto-occipital and R frontal territories suggestive of subacute ischemia. CTA H/N did not reveal any significant stenosis of LVO. Labs notable for thrombocytosis (724). Seen by hematology with +Jak2 now on hydroxyurea. MRI Brain repeated 3/23/20 showing typical evolution of infarct. Cognitive deficits and L visual field cut persist post hospitalization. She is out of work and seeking disability due to above residual symptoms. Modified MOCA 15/22 at her last visit. She was referred for formal neuropsychologic assessment with Dr. Manan Shell which revealed invalid results due to inconsistencies during testing (please see full report for details). She is extremely upset by these results, as she is applying for disability presently and states she cannot work with above symptoms. We discussed referral to another neuropsychologist for second opinion/repeat testing. Will also obtain baseline EEG due to h/o reported childhood seizure d/o to exclude any remote possibility of ictal activity contributing to her cognitive complaints. She denies new focal neurologic deficits since her hospitalization. Will continue current antiplatelet therapy for stroke prevention.     Plan:   Obtain routine EEG  Referral to Dr. Matthew Damon for repeat neuropsychologic testing per pt request  Continue ASA/statin therapy for stroke prevention  Hematology F/U for essential thrombocytosis  Ophthalmology evaluation for formal visual field testing  No driving due to visual deficits  F/U TBA after repeat testing     Signed:  Elijah Amezcua DO  7/22/2020

## 2020-07-22 NOTE — TELEPHONE ENCOUNTER
Pt said she was told notes were given to Dr. Charlene Logan but she said Dr. John Sierra said she didn't receive them.  She is requesting a call back

## 2020-07-22 NOTE — PATIENT INSTRUCTIONS
[facility]   Statement to Patients  April 1, 2014      In an effort to ensure the large volume of patient prescription refills is processed in the most efficient and expeditious manner, we are asking our patients to assist us by calling your Pharmacy for all prescription refills, this will include also your  Mail Order Pharmacy. The pharmacy will contact our office electronically to continue the refill process. Please do not wait until the last minute to call your pharmacy. We need at least 48 hours (2days) to fill prescriptions. We also encourage you to call your pharmacy before going to  your prescription to make sure it is ready. With regard to controlled substance prescription refill requests (narcotic refills) that need to be picked up at our office, we ask your cooperation by providing us with at least 72 hours (3days) notice that you will need a refill. We will not refill narcotic prescription refill requests after 4:00pm on any weekday, Monday through Thursday, or after 2:00pm on Fridays, or on the weekends. We encourage everyone to explore another way of getting your prescription refill request processed using [employer], our patient web portal through our electronic medical record system. [employer] is an efficient and effective way to communicate your medication request directly to the office and  downloadable as an shane on your smart phone . [employer] also features a review functionality that allows you to view your medication list as well as leave messages for your physician. Are you ready to get connected? If so please review the attatched instructions or speak to any of our staff to get you set up right away! Thank you so much for your cooperation. Should you have any questions please contact our Practice Administrator.     The Physicians and Staff,  [facility]

## 2020-07-27 ENCOUNTER — TELEPHONE (OUTPATIENT)
Dept: NEUROLOGY | Age: 55
End: 2020-07-27

## 2020-07-27 NOTE — TELEPHONE ENCOUNTER
Pt needs to speak with you about the doctor she was referred to - does the pt need to call and schedule or will they contact her?

## 2020-07-28 ENCOUNTER — TELEPHONE (OUTPATIENT)
Dept: NEUROLOGY | Age: 55
End: 2020-07-28

## 2020-07-28 NOTE — TELEPHONE ENCOUNTER
Pt requesting her office notes be sent to her PCP. She is also said she scheduled for EEG on 7/30, eye exam is scheduled for 8/13 and office visit with Dr. Cricket Mariano is 9/4.        Fax: [phone]

## 2020-07-28 NOTE — TELEPHONE ENCOUNTER
SW did call pt last week and spoke with she and her . Pt stated she had had a stroke, SW explained the neurologist would make those type of decisions, Dr. Baldev Ontiveros was seeing her for her blood issue.

## 2020-07-30 ENCOUNTER — HOSPITAL ENCOUNTER (OUTPATIENT)
Dept: NEUROLOGY | Age: 55
Discharge: HOME | End: 2020-07-30
Attending: PSYCHIATRY & NEUROLOGY
Payer: COMMERCIAL

## 2020-07-30 DIAGNOSIS — R41.3 COMPLAINTS OF MEMORY DISTURBANCE: ICD-10-CM

## 2020-07-30 DIAGNOSIS — R56.9 SEIZURE IN CHILDHOOD (HCC): ICD-10-CM

## 2020-07-30 PROCEDURE — 95816 EEG AWAKE AND DROWSY: CPT

## 2020-07-30 PROCEDURE — 95816 EEG AWAKE AND DROWSY: CPT | Performed by: PSYCHIATRY & NEUROLOGY

## 2020-10-14 ENCOUNTER — TELEPHONE (OUTPATIENT)
Dept: NEUROLOGY | Facility: CLINIC | Age: 55
End: 2020-10-14

## 2020-10-23 ENCOUNTER — VIRTUAL VISIT (OUTPATIENT)
Dept: ONCOLOGY | Age: 55
End: 2020-10-23
Payer: COMMERCIAL

## 2020-10-23 ENCOUNTER — TELEPHONE (OUTPATIENT)
Dept: ONCOLOGY | Age: 55
End: 2020-10-23

## 2020-10-23 DIAGNOSIS — D47.3 ESSENTIAL THROMBOCYTOSIS (HCC): Primary | ICD-10-CM

## 2020-10-23 DIAGNOSIS — D75.839 THROMBOCYTOSIS: Primary | ICD-10-CM

## 2020-10-23 PROCEDURE — 99214 OFFICE O/P EST MOD 30 MIN: CPT | Performed by: INTERNAL MEDICINE

## 2020-10-23 NOTE — PROGRESS NOTES
Emperatriz Hendrix is a 60 y.o. female here for 3 month follow up for:  Chief Complaint   Patient presents with    Thrombocytosis   Pt on Hydrea    1. Have you been to the ER, urgent care clinic since your last visit? Hospitalized since your last visit? no    2. Have you seen or consulted any other health care providers outside of the [address] [place] [address] since your last visit? Include any pap smears or colon screening. Foot doctor    Last CBC was in June. Pt states she is doing well. No complaints.

## 2020-10-24 NOTE — PROGRESS NOTES
[facility]  [address] [facility], [address] [place] [address] Perez Ozuna, [address] [facility]  [phone]       Progress Note      Patient: Ede Dorsey MRN: [id number]  SSN: [id number]    YOB: 1965  Age: 60 y.o. Sex: female        Reason for Visit:   Ede Dorsey is a 60 y.o. female who is seen by synchronous (real-time) audio-video technology for follow up of essential thrombocytosis. Subjective:      Ede Dorsey is a 60 y.o. female who I am seeing in follow up for thrombocytosis. She is admitted with subacute difficulties with visual processing, cognitive impairment, slurred speech, dizziness, headaches x 2-3 weeks. MRI Brain this admission reveals restricted diffusion and enhancement of the R parieto-occipital and R frontal territories with rather confluent T2 hyperintensity in a similar distribution on FLAIR. CTA H/N did not reveal any significant stenosis of LVO. She is taking Hydroxyurea and denies any side effects.        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    Seizures ([address]     since age 1 last seizure ; on dilantin (drug level checked annually by PCP)    Stroke ([address]     Thrombocytosis ([address] 2019     Past Surgical History:   Procedure Laterality Date    HX BREAST BIOPSY Left     NEG    HX  SECTION      x 2    HX HEENT      LASIK    HX KNEE ARTHROSCOPY Bilateral     HX KNEE REPLACEMENT Right 04/2018    HX ORTHOPAEDIC Left 2013    TKR    HX ORTHOPAEDIC Left 2016    [address]    HX ORTHOPAEDIC Right     RTH    HX OTHER SURGICAL      BIOPSY LEFT LOWER LEG(AGE 23)    HX TONSILLECTOMY      HX TUBAL LIGATION        Family History   Problem Relation Age of Onset    Cancer Mother 48        breast cancer    Diabetes Mother     [de-identified] Problems Mother         PONV    Heart Disease Father     Hypertension Father     No Known Problems Brother      Social History     Tobacco Use    Smoking status: Never Smoker    Smokeless tobacco: Never Used   Substance Use Topics    Alcohol use: Yes     Comment: <1/wk      Prior to Admission medications    Medication Sig Start Date End Date Taking? Authorizing Provider   HYDROXYUREA PO Take  by mouth. 100 mg daily   Yes Provider, Historical   aspirin (ASPIRIN) 325 mg tablet Take 1 Tab by mouth daily. 3/15/20  Yes Blake Atkinson MD   atorvastatin (LIPITOR) 80 mg tablet Take 1 Tab by mouth nightly. 3/15/20  Yes Blake Atkinson MD   OTHER [facility] 3/15/20  Yes Blake Atkinson MD   phenytoin ER (Dilantin Extended) 100 mg ER capsule Take 300 mg by mouth Daily (before breakfast). Take with Dilantin 30 mg capsule for total daily dose of 330 mg every morning   Yes Provider, Historical   phenytoin ER (Dilantin) 30 mg ER capsule Take 30 mg by mouth Daily (before breakfast).  Take with Dilantin 300 mg capsule for total daily dose of 330 mg every morning    Yes Provider, Historical              No Known Allergies        Objective:     General: alert, cooperative, no distress   Mental  status: normal mood, behavior, speech, dress, motor activity, and thought processes, able to follow commands   HENT: NCAT   Neck: no visualized mass   Resp: no respiratory distress   Neuro: no gross deficits   Skin: no discoloration or lesions of concern on visible areas   Psychiatric: normal affect, consistent with stated mood, no evidence of hallucinations       Due to this being a TeleHealth evaluation (During RNRKK-57 public health emergency), many elements of the physical examination are unable to be assessed. Evaluation of the following organ systems was limited: Vitals/Constitutional/EENT/Resp/CV/GI//MS/Neuro/Skin/Heme-Lymph-Imm. Lab Results   Component Value Date/Time    WBC 5.3 06/17/2020 12:00 AM    HGB 12.9 06/17/2020 12:00 AM    HCT 38.0 06/17/2020 12:00 AM    PLATELET 081 (H) 08/01/7898 12:00 AM     (H) 06/17/2020 12:00 AM             Assessment:     1. Essential Thrombocytosis    Solomon 2 V617F +ve  High risk   On ASA  Recent CVA. On Hydroxyurea 100 mg daily  Tolerating well  Platelet count is coming down      Plan:        1. Continue Hydrea 1000 mg /day   2. Continue ASA  3. CBC in 3 and 6 month  4. RTC in 6 months      Signed by: Jules Man MD                     October 23, 2020      CC. Juan Wade MD        I was in the office while conducting this encounter. The patient was at her home. Consent:  She and/or her healthcare decision maker is aware that this patient-initiated Telehealth encounter is a billable service, with coverage as determined by her insurance carrier. She is aware that she may receive a bill and has provided verbal consent to proceed: Yes    Pursuant to the emergency declaration under the [address] and the [facility], [address]5 waiver authority and the [employer] Act, this Virtual  Visit was conducted, with patient's (and/or legal guardian's) consent, to reduce the patient's risk of exposure to COVID-19 and provide necessary medical care. Services were provided through a video synchronous discussion virtually to substitute for in-person visit.

## 2020-10-26 DIAGNOSIS — D75.839 THROMBOCYTOSIS: ICD-10-CM

## 2020-10-26 NOTE — TELEPHONE ENCOUNTER
Per MD. Will send lab slips to pcp for now and again in 6 months. VORB FROM DR Margot Angel CBC WITH DIFF NOW AND 6 MONTHS.

## 2020-10-29 ENCOUNTER — TELEPHONE (OUTPATIENT)
Dept: NEUROLOGY | Facility: CLINIC | Age: 55
End: 2020-10-29

## 2020-10-29 LAB
BASOPHILS # BLD AUTO: 0 X10E3/UL (ref 0–0.2)
BASOPHILS NFR BLD AUTO: 0 %
EOSINOPHIL # BLD AUTO: 0.1 X10E3/UL (ref 0–0.4)
EOSINOPHIL NFR BLD AUTO: 2 %
ERYTHROCYTE [DISTWIDTH] IN BLOOD BY AUTOMATED COUNT: 12.4 % (ref 11.7–15.4)
HCT VFR BLD AUTO: 37.4 % (ref 34–46.6)
HGB BLD-MCNC: 13 G/DL (ref 11.1–15.9)
IMM GRANULOCYTES # BLD AUTO: 0 X10E3/UL (ref 0–0.1)
IMM GRANULOCYTES NFR BLD AUTO: 0 %
LYMPHOCYTES # BLD AUTO: 1.6 X10E3/UL (ref 0.7–3.1)
LYMPHOCYTES NFR BLD AUTO: 28 %
MCH RBC QN AUTO: 36.9 PG (ref 26.6–33)
MCHC RBC AUTO-ENTMCNC: 34.8 G/DL (ref 31.5–35.7)
MCV RBC AUTO: 106 FL (ref 79–97)
MONOCYTES # BLD AUTO: 0.5 X10E3/UL (ref 0.1–0.9)
MONOCYTES NFR BLD AUTO: 9 %
NEUTROPHILS # BLD AUTO: 3.4 X10E3/UL (ref 1.4–7)
NEUTROPHILS NFR BLD AUTO: 61 %
PLATELET # BLD AUTO: 475 X10E3/UL (ref 150–450)
RBC # BLD AUTO: 3.52 X10E6/UL (ref 3.77–5.28)
WBC # BLD AUTO: 5.7 X10E3/UL (ref 3.4–10.8)

## 2020-10-29 NOTE — TELEPHONE ENCOUNTER
Pt calling to find out if she needs an appt scheduled for Dr. Josef Harris to fill out disability paperwork. She is aware Lexy Melissa is out of the office until Monday.

## 2020-11-02 NOTE — TELEPHONE ENCOUNTER
Spoke with patient, she states she can have her insurance premiums waved if  will fill out a disability waiver. Scheduled follow up for 11/19 at 940am with . She wanted to know if this could be done before her appointment, she had her neuropsych testing done as well as her EEG.

## 2020-11-03 NOTE — TELEPHONE ENCOUNTER
Spoke with patient, informed her  will address her paper work at her follow up in 2 weeks, and her EEG was normal per . She verbalized understanding.

## 2020-11-03 NOTE — PROCEDURES
[address]   EEG    Name:  Giorgio Juan  MR#:  [id number]  :  1965  ACCOUNT #:  [de-identified]  DATE OF SERVICE:  2020      REQUESTING PHYSICIAN:  Dr. Cecilia Acuna. HISTORY:  The patient is a 57-year-old female who is being evaluated for history of seizure disorder. DESCRIPTION:  This is an 18-channel EEG performed on an awake and drowsy patient. During wakefulness, the dominant posterior background rhythm consists of symmetric well-modulated medium voltage rhythms in the 8 Hz frequency range out of the posterior head region. Drowsiness is characterized by slowing in the central region. Photic stimulation elicits a symmetric driving response. Hyperventilation was not performed. EEG SUMMARY:  Normal study. CLINICAL INTERPRETATION:  This was normal EEG during awake and drowsy states of the patient. No lateralizing or epileptiform features were noted.       Robert Miller MD      [id number]  D:  2020 8:00  T:  2020 10:04  JOB #:  [id number]

## 2020-11-05 DIAGNOSIS — D75.839 THROMBOCYTOSIS: Primary | ICD-10-CM

## 2020-11-05 RX ORDER — HYDROXYUREA 500 MG/1
1000 CAPSULE ORAL DAILY
Qty: 180 CAP | Refills: 0 | Status: SHIPPED | OUTPATIENT
Start: 2020-11-05 | End: 2020-12-14 | Stop reason: SDUPTHER

## 2020-11-12 ENCOUNTER — OFFICE VISIT (OUTPATIENT)
Dept: NEUROLOGY | Age: 55
End: 2020-11-12
Payer: MEDICAID

## 2020-11-12 VITALS
SYSTOLIC BLOOD PRESSURE: 142 MMHG | DIASTOLIC BLOOD PRESSURE: 84 MMHG | OXYGEN SATURATION: 98 % | HEART RATE: 73 BPM | RESPIRATION RATE: 18 BRPM

## 2020-11-12 DIAGNOSIS — R41.3 COMPLAINTS OF MEMORY DISTURBANCE: ICD-10-CM

## 2020-11-12 DIAGNOSIS — I63.9 CEREBROVASCULAR ACCIDENT (CVA), UNSPECIFIED MECHANISM (HCC): Primary | ICD-10-CM

## 2020-11-12 DIAGNOSIS — D47.3 ESSENTIAL THROMBOCYTOSIS (HCC): ICD-10-CM

## 2020-11-12 PROCEDURE — 99214 OFFICE O/P EST MOD 30 MIN: CPT | Performed by: PSYCHIATRY & NEUROLOGY

## 2020-11-12 RX ORDER — LORAZEPAM 0.5 MG/1
TABLET ORAL
COMMUNITY

## 2020-11-12 RX ORDER — DICLOFENAC SODIUM 75 MG/1
75 TABLET, DELAYED RELEASE ORAL 2 TIMES DAILY
COMMUNITY
End: 2021-05-05

## 2020-11-12 NOTE — PROGRESS NOTES
Neurology Clinic Follow up Note    Patient ID:  Angelique Mcmillan  [id number]  60 y.o.  1965      Ms. Sorensen is here for follow up today of cognitive complaints. Last Appointment With Me:  Hospital F/U, last seen 4/15/20    \"55 y.o.  right handed  female, we were asked to see for stroke, vision deficit. PMH notable for seizure d/o since childhood on PHT, thrombocytosis. She is admitted due to recent abnormal MRI findings. Imaging was ordered by her PCP after patient reported difficulty with new onset headaches x 3 weeks, slurred speech, difficulty with reading/visual perception. According to the patient, she noted bi-frontal non-migrainous headaches over the past 3 weeks, slurred speech 1 week ago, difficulty processing words on her computer screen and reading after returning to work this past Monday (was out following cervical decompression). She also noted difficulty balancing her checkbook for the past couple weeks. She endorses some blurred vision and noted an episode of dizziness/vertigofollowing her surgery several weeks ago without headaches. She reports a h/o seizures since age 1 presenting as visual blindness bilaterally, preserved consciousness on PHT. She denies any recent typical seizure activity. MRI Brain this admission reveals restricted diffusion and enhancement of the R parieto-occipital and R frontal territories with rather confluent T2 hyperintensity in a similar distribution on FLAIR. CTA H/N did not reveal any significant stenosis of LVO. Labs are notable for thrombocytosis (724), . \"    Interval History:   Patient reports she is still having difficulty with memory. This is possibly slightly improved since her last visit. She notes word finding difficulty, deficits with multitasking/problem solving and calculating. She completed neuropsychologic testing again with Dr. Esteban Black which did note difficulty in certain cognitive domains likely attributable to prior stroke. 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 She remains out of work at this time. She is currently on disability. L VF deficit is slightly improved. Denies new focal weakness, numbness, speech deficits. She is compliant with ASA/statin therapy. She has completed OT. PMHx/ PSHx/ FHx/ SHx:  Reviewed and unchanged previous visit. Past Medical History:   Diagnosis Date    Arthritis     Chronic pain     bilateral hips and right knee    Morbid obesity with BMI of 45.0-49.9, adult ([facility] 4/20/2016    Motion sickness     when not eating    MRSA carrier 9/13/2019    Seizures ([facility]     since age 1 last seizure 2006; on dilantin (drug level checked annually by PCP)    Stroke ([facility]     Thrombocytosis ([facility] 9/12/2019         ROS:  Comprehensive review of systems negative except for as noted above. Objective:       Meds:  Current Outpatient Medications   Medication Sig Dispense Refill    LORazepam (Ativan) 0.5 mg tablet Take  by mouth.  diclofenac EC (VOLTAREN) 75 mg EC tablet Take 75 mg by mouth two (2) times a day.  hydroxyurea (Hydrea) 500 mg capsule Take 2 Caps by mouth daily for 90 days. 180 Cap 0    aspirin (ASPIRIN) 325 mg tablet Take 1 Tab by mouth daily. 30 Tab 0    atorvastatin (LIPITOR) 80 mg tablet Take 1 Tab by mouth nightly. 30 Tab 0    phenytoin ER (Dilantin Extended) 100 mg ER capsule Take 300 mg by mouth Daily (before breakfast). Take with Dilantin 30 mg capsule for total daily dose of 330 mg every morning      phenytoin ER (Dilantin) 30 mg ER capsule Take 30 mg by mouth Daily (before breakfast). Take with Dilantin 300 mg capsule for total daily dose of 330 mg every morning          Exam:. NEUROLOGICAL EXAM:  General: Awake, alert, speech fluent. Oriented to month/year only. Difficulty with serial 7's.    CN: EOMI, L VF cut (improving), facial strength/sensation normal and symmetric, hearing is intact  Motor: AG x 4  Reflexes: deferred  Coordination: No ataxia. Sensation: LT intact throughout  Gait: Normal-based, steady    LABS  Results for orders placed or performed in visit on 10/23/20   CBC WITH AUTOMATED DIFF   Result Value Ref Range    WBC 5.7 3.4 - 10.8 x10E3/uL    RBC 3.52 (L) 3.77 - 5.28 x10E6/uL    HGB 13.0 11.1 - 15.9 g/dL    HCT 37.4 34.0 - 46.6 %     (H) 79 - 97 fL    MCH 36.9 (H) 26.6 - 33.0 pg    MCHC 34.8 31.5 - 35.7 g/dL    RDW 12.4 11.7 - 15.4 %    PLATELET 106 (H) 289 - 450 x10E3/uL    NEUTROPHILS 61 Not Estab. %    Lymphocytes 28 Not Estab. %    MONOCYTES 9 Not Estab. %    EOSINOPHILS 2 Not Estab. %    BASOPHILS 0 Not Estab. %    ABS. NEUTROPHILS 3.4 1.4 - 7.0 x10E3/uL    Abs Lymphocytes 1.6 0.7 - 3.1 x10E3/uL    ABS. MONOCYTES 0.5 0.1 - 0.9 x10E3/uL    ABS. EOSINOPHILS 0.1 0.0 - 0.4 x10E3/uL    ABS. BASOPHILS 0.0 0.0 - 0.2 x10E3/uL    IMMATURE GRANULOCYTES 0 Not Estab. %    ABS. IMM. GRANS. 0.0 0.0 - 0.1 x10E3/uL       IMAGING:  MRI Results (most recent):  Results from Hospital Encounter encounter on 07/01/20   MRI CERV SPINE W WO CONT    Narrative EXAM:  MRI CERV SPINE W WO CONT    INDICATION:   Cervical radiculopathy. COMPARISON: MR cervical spine 3/13/2020. TECHNIQUE: Multiplanar multisequence acquisition without and with contrast of  the cervical spine. The study was performed on an open configuration low field  strength MR imaging system. CONTRAST: 20 cc Dotarem. FINDINGS:  Evaluation is limited by motion. Stable postsurgical changes of C5-C6 ACDF. There is no evidence of abnormal  intraspinal or osseous enhancement. There is normal alignment of the cervical  spine. Vertebral body heights are maintained without evidence of acute fracture. Marrow signal is normal. Mild degenerative changes as below, unchanged from  prior exam. The cervical cord is normal in size and signal. Region of the  foramen magnum is unremarkable. Visualized soft tissues are unremarkable.     C2-C3: Severe right facet arthropathy. No significant disc herniation, spinal  canal or neural foraminal stenosis. C3-C4: Small central disc osteophyte protrusion. No significant spinal canal or  neural foraminal stenosis. C4-C5: Small central disc osteophyte protrusion. No significant spinal canal or  neural foraminal stenosis. C5-C6: Status post ACDF. No significant spinal canal or neural foraminal  stenosis. C6-C7: No significant disc herniation, spinal canal or neural foraminal  stenosis. C7-T1: Mild bilateral facet arthropathy. No significant disc herniation, spinal  canal or neural foraminal stenosis. Impression IMPRESSION:    1. Stable postsurgical changes of C5-C6 ACDF without complication. 2. Stable mild degenerative changes as above without significant spinal canal or  neural foraminal stenosis at any level. Assessment:     Encounter Diagnoses     ICD-10-CM ICD-9-CM   1. Cerebrovascular accident (CVA), unspecified mechanism ([facility]  I63.9 434.91   2. Complaints of memory disturbance  R41.3 780.93   3. Essential thrombocytosis (HCC)  D47.3 238.71      60year old RHF with a h/o seizure d/o since childhood on PHT, thrombocytosis presenting with subacute difficulties with visual processing, cognitive impairment, slurred speech, dizziness, headaches. MRI Brain during 3/2020 admission revealed restricted diffusion and enhancement of the R parieto-occipital and R frontal territories suggestive of subacute ischemia. CTA H/N did not reveal any significant stenosis of LVO. Labs notable for thrombocytosis (724). Seen by hematology with +Jak2 now on hydroxyurea. MRI Brain repeated 3/23/20 showing typical evolution of infarct. EEG also performed due to remote childhood seizure d/o and without evidence of epileptiform activity. Modified MOCA 15/22 with subsequent repeat neuropsychologic testing confirming cognitive deficits which would likely interfere with continued employment.  She is now on disability due to above symptoms. Cognitive deficits and L visual field cut are slightly improved post hospitalization. Will continue current antiplatelet therapy for stroke prevention. I have advised that she continue to abstain from driving due to visual deficits. Plan:   Continue ASA/statin therapy for stroke prevention  Hematology F/U for essential thrombocytosis    Follow-up and Dispositions    · Return in about 1 year (around 11/12/2021).          Signed:  Lamine Bennett DO  11/12/2020

## 2020-12-01 ENCOUNTER — DOCUMENTATION ONLY (OUTPATIENT)
Dept: NEUROLOGY | Age: 55
End: 2020-12-01

## 2020-12-08 ENCOUNTER — TELEPHONE (OUTPATIENT)
Dept: NEUROLOGY | Age: 55
End: 2020-12-08

## 2020-12-08 NOTE — TELEPHONE ENCOUNTER
----- Message from Robles Zurita sent at 12/8/2020 10:14 AM EST -----  Regarding: Dr Vani Zamarripa first and last name:      Reason for call:pt is following up on a insurance form that was submitted on 11/14th and  again on  11/19th for her [employer], she gave it to Marge Baez and would like to know the status and if it was filled out and sent in if not can email it to  [email or web address]   that is her   [address] [facility] email address    Callback required yes/no and why:yes for reason given above please confirm either way if it was sent or not,thanks.       Best contact number(s):[phone]      Details to clarify the request:      Robles Zurita

## 2020-12-14 ENCOUNTER — TELEPHONE (OUTPATIENT)
Dept: NEUROLOGY | Age: 55
End: 2020-12-14

## 2020-12-14 DIAGNOSIS — D75.839 THROMBOCYTOSIS: ICD-10-CM

## 2020-12-14 RX ORDER — HYDROXYUREA 500 MG/1
1000 CAPSULE ORAL DAILY
Qty: 180 CAP | Refills: 0 | Status: SHIPPED | OUTPATIENT
Start: 2020-12-14 | End: 2021-03-08

## 2020-12-14 NOTE — TELEPHONE ENCOUNTER
Pt calling stating her insurance company hasn't received her paperwork. She said she was told it was sent.  Please call back

## 2021-03-01 ENCOUNTER — TELEPHONE (OUTPATIENT)
Dept: ONCOLOGY | Age: 56
End: 2021-03-01

## 2021-03-06 DIAGNOSIS — D75.839 THROMBOCYTOSIS: ICD-10-CM

## 2021-03-08 DIAGNOSIS — D75.839 THROMBOCYTOSIS: Primary | ICD-10-CM

## 2021-03-08 RX ORDER — HYDROXYUREA 500 MG/1
1000 CAPSULE ORAL DAILY
Qty: 180 CAP | Refills: 0 | Status: SHIPPED | OUTPATIENT
Start: 2021-03-08 | End: 2021-06-06

## 2021-03-08 NOTE — TELEPHONE ENCOUNTER
Pt will also need labs prior to appointment.   Will order and pt can go to the new lab in [facility] [address] or [facility].

## 2021-03-08 NOTE — TELEPHONE ENCOUNTER
VORB per provider, refill approved. Requested Prescriptions   Pending Prescriptions Disp Refills    hydroxyurea (HYDREA) 500 mg capsule [Pharmacy Med Name: HYDROXYUREA 500 MG CAPSULE] 180 Cap 0     Sig: TAKE 2 CAPS BY MOUTH DAILY FOR 90 DAYS.

## 2021-04-05 ENCOUNTER — TELEPHONE (OUTPATIENT)
Dept: ONCOLOGY | Age: 56
End: 2021-04-05

## 2021-04-05 NOTE — TELEPHONE ENCOUNTER
Called patient to remind of annual f/u appt and to make aware of new address/ Voice MB is full.  No message left

## 2021-04-06 NOTE — PROGRESS NOTES
Selene Tolliver is a 60 y.o. female here for one year virtual visit follow up for thrombocytosis. She is on Hydrea. 1. Have you been to the ER, urgent care clinic since your last visit? Hospitalized since your last visit? no    2. Have you seen or consulted any other health care providers outside of the [address] [place] [address] since your last visit? Include any pap smears or colon screening. No    Pt states she is doing fine. No complaints.

## 2021-04-07 ENCOUNTER — VIRTUAL VISIT (OUTPATIENT)
Dept: ONCOLOGY | Age: 56
End: 2021-04-07
Payer: MEDICAID

## 2021-04-07 DIAGNOSIS — D47.3 ESSENTIAL THROMBOCYTOSIS (HCC): Primary | ICD-10-CM

## 2021-04-07 PROCEDURE — 99213 OFFICE O/P EST LOW 20 MIN: CPT | Performed by: INTERNAL MEDICINE

## 2021-04-07 RX ORDER — ROSUVASTATIN CALCIUM 10 MG/1
10 TABLET, COATED ORAL
COMMUNITY

## 2021-04-07 NOTE — PROGRESS NOTES
[facility]  at [facility]  [address] [facility], [address] [place] [address] Perez Ozuna, [address]  [phone]       Progress Note      Patient: Rashaun Maldonado MRN: [id number]  SSN: [id number]    YOB: 1965  Age: 60 y.o. Sex: female        Reason for Visit:   Rashaun Maldonado is a 60 y.o. female who is seen by synchronous (real-time) audio-video technology for follow up of essential thrombocytosis. Subjective:      Rashaun Maldonado is a 60 y.o. female who I am seeing in follow up for thrombocytosis. She is admitted with subacute difficulties with visual processing, cognitive impairment, slurred speech, dizziness, headaches x 2-3 weeks. MRI Brain this admission reveals restricted diffusion and enhancement of the R parieto-occipital and R frontal territories with rather confluent T2 hyperintensity in a similar distribution on FLAIR. CTA H/N did not reveal any significant stenosis of LVO. She is taking Hydroxyurea and denies any side effects.        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    Seizures ([address]     since age 1 last seizure ; on dilantin (drug level checked annually by PCP)    Stroke ([address]     Thrombocytosis ([address] 2019     Past Surgical History:   Procedure Laterality Date    HX BREAST BIOPSY Left     NEG    HX  SECTION      x 2    HX HEENT      LASIK    HX KNEE ARTHROSCOPY Bilateral     HX KNEE REPLACEMENT Right 04/2018    HX ORTHOPAEDIC Left 2013    TKR    HX ORTHOPAED[address] Left 2016    [address]    HX ORTHOPAEDIC Right     RTH    HX OTHER SURGICAL      BIOPSY LEFT LOWER LEG(AGE 23)    HX TONSILLECTOMY      HX TUBAL LIGATION        Family History   Problem Relation Age of Onset    Cancer Mother 48        breast cancer    Diabetes Mother     [de-identified] Problems Mother         PONV    Heart Disease Father     Hypertension Father     No Known Problems Brother      Social History     Tobacco Use    Smoking status: Never Smoker    Smokeless tobacco: Never Used   Substance Use Topics    Alcohol use: Yes     Comment: <1/wk      Prior to Admission medications    Medication Sig Start Date End Date Taking? Authorizing Provider   rosuvastatin (CRESTOR) 10 mg tablet Take 10 mg by mouth nightly. Yes Provider, Historical   hydroxyurea (HYDREA) 500 mg capsule TAKE 2 CAPS BY MOUTH DAILY FOR 90 DAYS. 3/8/21 6/6/21 Yes Rebel Rubio MD   LORazepam (Ativan) 0.5 mg tablet Take  by mouth. Yes Provider, Historical   diclofenac EC (VOLTAREN) 75 mg EC tablet Take 75 mg by mouth two (2) times a day. Yes Provider, Historical   aspirin (ASPIRIN) 325 mg tablet Take 1 Tab by mouth daily. 3/15/20  Yes Ene Young MD   phenytoin ER (Dilantin Extended) 100 mg ER capsule Take 300 mg by mouth Daily (before breakfast). Take with Dilantin 30 mg capsule for total daily dose of 330 mg every morning   Yes Provider, Historical   phenytoin ER (Dilantin) 30 mg ER capsule Take 30 mg by mouth Daily (before breakfast). Take with Dilantin 300 mg capsule for total daily dose of 330 mg every morning    Yes Provider, Historical   atorvastatin (LIPITOR) 80 mg tablet Take 1 Tab by mouth nightly.  3/15/20   Ene Young MD              No Known Allergies        Objective:     General: alert, cooperative, no distress   Mental  status: normal mood, behavior, speech, dress, motor activity, and thought processes, able to follow commands HENT: NCAT   Neck: no visualized mass   Resp: no respiratory distress   Neuro: no gross deficits   Skin: no discoloration or lesions of concern on visible areas   Psychiatric: normal affect, consistent with stated mood, no evidence of hallucinations       Due to this being a TeleHealth evaluation (During TBKGB-41 public health emergency), many elements of the physical examination are unable to be assessed. Evaluation of the following organ systems was limited: Vitals/Constitutional/EENT/Resp/CV/GI//MS/Neuro/Skin/Heme-Lymph-Imm. Lab Results   Component Value Date/Time    WBC 5.7 10/28/2020 12:00 AM    HGB 13.0 10/28/2020 12:00 AM    HCT 37.4 10/28/2020 12:00 AM    PLATELET 839 (H) 28/04/1495 12:00 AM     (H) 10/28/2020 12:00 AM             Assessment:     1. Essential Thrombocytosis    Solomon 2 V617F +ve  High risk   On ASA  Recent CVA. On Hydroxyurea 100 mg daily  Tolerating well  Platelet count is coming down      Plan:        1. Continue Hydrea 1000 mg /day   2. Continue ASA  3. CBC in 3 and 6 month  4. RTC in 6 months      Signed by: Lynnwood Najjar, MD                     April 7, 2021      CC. Araseli Bentley MD      The patient was evaluated through a synchronous (real-time) audio-video encounter. The patient (or guardian if applicable) is aware that this is a billable service. Verbal consent to proceed has been obtained within the past 12 months. The visit was conducted pursuant to the emergency declaration under the [address] [place] [address], [address] [place] [address] authority and the [employer] Act. Patient identification was verified, and a caregiver was present when appropriate. The patient was located in a state where the provider was credentialed to provide care.

## 2021-04-07 NOTE — LETTER
4/7/2021 Patient: Kandi Gallo YOB: 1965 Date of Visit: 4/7/2021 David Shannon PA-C 
[address] [place] [id number] Via Fax: [phone] Dear David Shannon PA-C, Thank you for referring Ms. Valentine Sorensen to [address] [place] [address] for evaluation. My notes for this consultation are attached. If you have questions, please do not hesitate to call me. I look forward to following your patient along with you.  
 
 
Sincerely, 
 
Jose Antonio Miller MD 
 

No

## 2021-04-26 DIAGNOSIS — D75.839 THROMBOCYTOSIS: ICD-10-CM

## 2021-05-04 ENCOUNTER — TELEPHONE (OUTPATIENT)
Dept: NEUROLOGY | Age: 56
End: 2021-05-04

## 2021-05-04 NOTE — TELEPHONE ENCOUNTER
Please advise    ----- Message from [facility] sent at 5/4/2021  9:29 AM EDT -----  Regarding: /Telephone     Caller's first and last name:Pt  Reason for call:VV request  Callback required yes/no and why:Yes  Best contact number(s):[phone]  Details to clarify the request:Pt has an appointment tomorrow and wants to know if it can be a VV.

## 2021-05-04 NOTE — TELEPHONE ENCOUNTER
Spoke with patient, she states her  has to work tomorrow and since her stroke she is unable to drive to her appointment tomorrow.  She is hoping it can be changed to virtual.

## 2021-05-05 ENCOUNTER — VIRTUAL VISIT (OUTPATIENT)
Dept: NEUROLOGY | Age: 56
End: 2021-05-05
Payer: MEDICAID

## 2021-05-05 DIAGNOSIS — G31.84 MILD COGNITIVE IMPAIRMENT WITH MEMORY LOSS: ICD-10-CM

## 2021-05-05 DIAGNOSIS — I63.9 CEREBROVASCULAR ACCIDENT (CVA), UNSPECIFIED MECHANISM (HCC): Primary | ICD-10-CM

## 2021-05-05 DIAGNOSIS — D47.3 ESSENTIAL THROMBOCYTOSIS (HCC): ICD-10-CM

## 2021-05-05 PROCEDURE — 99214 OFFICE O/P EST MOD 30 MIN: CPT | Performed by: PSYCHIATRY & NEUROLOGY

## 2021-05-05 NOTE — PROGRESS NOTES
Neurology Clinic Follow up Note    Patient ID:  Miki Goodman  [id number]  60 y.o.  1965      Ms. Sorensen is here for follow up today of cognitive complaints. This is a telemedicine visit that was performed with the originating site at [facility] and the distant site at patient's home. Verbal consent to participate in video visit was obtained. The patient was identified by name and date of birth. This visit occurred during the Coronavirus [phone]) [facility]. I discussed with the patient the nature of our telemedicine visits, that:     I would evaluate the patient and recommend diagnostics and treatments based on my assessment   Our sessions are not being recorded and that personal health information is protected   Our team would provide follow up care in person if/when the patient needs it    Last Appointment With Me:  11/12/2020    \"55 y.o.  right handed  female, we were asked to see for stroke, vision deficit. PMH notable for seizure d/o since childhood on PHT, thrombocytosis. She is admitted due to recent abnormal MRI findings. Imaging was ordered by her PCP after patient reported difficulty with new onset headaches x 3 weeks, slurred speech, difficulty with reading/visual perception. According to the patient, she noted bi-frontal non-migrainous headaches over the past 3 weeks, slurred speech 1 week ago, difficulty processing words on her computer screen and reading after returning to work this past Monday (was out following cervical decompression). She also noted difficulty balancing her checkbook for the past couple weeks. She endorses some blurred vision and noted an episode of dizziness/vertigofollowing her surgery several weeks ago without headaches. She reports a h/o seizures since age 1 presenting as visual blindness bilaterally, preserved consciousness on PHT. She denies any recent typical seizure activity.   MRI Brain this admission reveals restricted diffusion and enhancement of the R parieto-occipital and R frontal territories with rather confluent T2 hyperintensity in a similar distribution on FLAIR. CTA H/N did not reveal any significant stenosis of LVO. Labs are notable for thrombocytosis (724), . \"    Interval History:   She was dx with COVID 2 months prior-was not hospitalized. She appears to have recovered well. Denies new focal weakness, numbness, speech deficits. She is compliant with ASA/statin therapy. L VF is stable/unchanged. She is not driving. Still having difficulty with memory/word finding difficulty, recall deficits. She remains on disability. PMHx/ PSHx/ FHx/ SHx:  Reviewed and unchanged previous visit. Past Medical History:   Diagnosis Date    Arthritis     Chronic pain     bilateral hips and right knee    Morbid obesity with BMI of 45.0-49.9, adult ([facility] 4/20/2016    Motion sickness     when not eating    MRSA carrier 9/13/2019    Seizures ([facility]     since age 1 last seizure 2006; on dilantin (drug level checked annually by PCP)    Stroke ([facility]     Thrombocytosis ([facility] 9/12/2019         ROS:  Comprehensive review of systems negative except for as noted above. Objective:       Meds:  Current Outpatient Medications   Medication Sig Dispense Refill    rosuvastatin (CRESTOR) 10 mg tablet Take 10 mg by mouth nightly.  hydroxyurea (HYDREA) 500 mg capsule TAKE 2 CAPS BY MOUTH DAILY FOR 90 DAYS. 180 Cap 0    LORazepam (Ativan) 0.5 mg tablet Take  by mouth.  aspirin (ASPIRIN) 325 mg tablet Take 1 Tab by mouth daily. 30 Tab 0    phenytoin ER (Dilantin Extended) 100 mg ER capsule Take 300 mg by mouth Daily (before breakfast). Take with Dilantin 30 mg capsule for total daily dose of 330 mg every morning      phenytoin ER (Dilantin) 30 mg ER capsule Take 30 mg by mouth Daily (before breakfast). Take with Dilantin 300 mg capsule for total daily dose of 330 mg every morning          Exam:.    Due to this being a TeleHealth evaluation, many elements of the physical examination are unable to be assessed. Patient-Reported Vitals 7/22/2020   Patient-Reported Weight 311lb   Patient-Reported Height 5'8   Patient-Reported Pulse 66   Patient-Reported Temperature (No Data)   Patient-Reported Systolic  (No Data)   Patient-Reported Diastolic (No Data)      NEUROLOGICAL EXAM:  General: Awake, alert, speech fluent. Oriented to date/month/year. CN: EOMI, L-VF cut, facial strength/sensation normal and symmetric, hearing is intact  Motor: AG x 4  Reflexes: deferred  Coordination: No ataxia. Sensation: Deferred   Gait: Steady    LABS  Results for orders placed or performed in visit on 10/23/20   CBC WITH AUTOMATED DIFF   Result Value Ref Range    WBC 5.7 3.4 - 10.8 x10E3/uL    RBC 3.52 (L) 3.77 - 5.28 x10E6/uL    HGB 13.0 11.1 - 15.9 g/dL    HCT 37.4 34.0 - 46.6 %     (H) 79 - 97 fL    MCH 36.9 (H) 26.6 - 33.0 pg    MCHC 34.8 31.5 - 35.7 g/dL    RDW 12.4 11.7 - 15.4 %    PLATELET 936 (H) 018 - 450 x10E3/uL    NEUTROPHILS 61 Not Estab. %    Lymphocytes 28 Not Estab. %    MONOCYTES 9 Not Estab. %    EOSINOPHILS 2 Not Estab. %    BASOPHILS 0 Not Estab. %    ABS. NEUTROPHILS 3.4 1.4 - 7.0 x10E3/uL    Abs Lymphocytes 1.6 0.7 - 3.1 x10E3/uL    ABS. MONOCYTES 0.5 0.1 - 0.9 x10E3/uL    ABS. EOSINOPHILS 0.1 0.0 - 0.4 x10E3/uL    ABS. BASOPHILS 0.0 0.0 - 0.2 x10E3/uL    IMMATURE GRANULOCYTES 0 Not Estab. %    ABS. IMM. GRANS. 0.0 0.0 - 0.1 x10E3/uL       IMAGING:  MRI Results (most recent):  Results from Hospital Encounter encounter on 07/01/20   MRI CERV SPINE W WO CONT    Narrative EXAM:  MRI CERV SPINE W WO CONT    INDICATION:   Cervical radiculopathy. COMPARISON: MR cervical spine 3/13/2020. TECHNIQUE: Multiplanar multisequence acquisition without and with contrast of  the cervical spine. The study was performed on an open configuration low field  strength MR imaging system.      CONTRAST: 20 cc Dotarem. FINDINGS:  Evaluation is limited by motion. Stable postsurgical changes of C5-C6 ACDF. There is no evidence of abnormal  intraspinal or osseous enhancement. There is normal alignment of the cervical  spine. Vertebral body heights are maintained without evidence of acute fracture. Marrow signal is normal. Mild degenerative changes as below, unchanged from  prior exam. The cervical cord is normal in size and signal. Region of the  foramen magnum is unremarkable. Visualized soft tissues are unremarkable. C2-C3: Severe right facet arthropathy. No significant disc herniation, spinal  canal or neural foraminal stenosis. C3-C4: Small central disc osteophyte protrusion. No significant spinal canal or  neural foraminal stenosis. C4-C5: Small central disc osteophyte protrusion. No significant spinal canal or  neural foraminal stenosis. C5-C6: Status post ACDF. No significant spinal canal or neural foraminal  stenosis. C6-C7: No significant disc herniation, spinal canal or neural foraminal  stenosis. C7-T1: Mild bilateral facet arthropathy. No significant disc herniation, spinal  canal or neural foraminal stenosis. Impression IMPRESSION:    1. Stable postsurgical changes of C5-C6 ACDF without complication. 2. Stable mild degenerative changes as above without significant spinal canal or  neural foraminal stenosis at any level. Assessment:     Encounter Diagnoses     ICD-10-CM ICD-9-CM   1. Cerebrovascular accident (CVA), unspecified mechanism ([facility]  I63.9 434.91   2. Essential thrombocytosis (HCC)  D47.3 238.71   3. Mild cognitive impairment with memory loss  G31.84 331.83      60year old RHF with a h/o seizure d/o since childhood on PHT, thrombocytosis presenting with subacute difficulties with visual processing, cognitive impairment, slurred speech, dizziness, headaches.   MRI Brain during 3/2020 admission revealed restricted diffusion and enhancement of the R parieto-occipital and R frontal territories suggestive of subacute ischemia. CTA H/N did not reveal any significant stenosis of LVO. Labs notable for thrombocytosis (724). Seen by hematology with +Jak2 now on hydroxyurea. MRI Brain repeated 3/23/20 showing typical evolution of infarct. EEG also performed due to remote childhood seizure d/o and without evidence of epileptiform activity. Modified MOCA 15/22 with subsequent repeat neuropsychologic testing confirming cognitive deficits which would likely interfere with continued employment. She is now on disability due to above symptoms. Cognitive deficits and L visual field cut are slightly improved post hospitalization. Will continue current antiplatelet therapy for stroke prevention. I have advised that she continue to abstain from driving due to visual deficits. Plan:   Continue ASA/statin therapy for stroke prevention  Hematology F/U for essential thrombocytosis    Follow-up and Dispositions    · Return in about 1 year (around 5/5/2022).            Signed:  Peewee Huber,   5/5/2021

## 2021-05-05 NOTE — PATIENT INSTRUCTIONS
[facility]   Statement to Patients  April 1, 2014      In an effort to ensure the large volume of patient prescription refills is processed in the most efficient and expeditious manner, we are asking our patients to assist us by calling your Pharmacy for all prescription refills, this will include also your  Mail Order Pharmacy. The pharmacy will contact our office electronically to continue the refill process. Please do not wait until the last minute to call your pharmacy. We need at least 48 hours (2days) to fill prescriptions. We also encourage you to call your pharmacy before going to  your prescription to make sure it is ready. With regard to controlled substance prescription refill requests (narcotic refills) that need to be picked up at our office, we ask your cooperation by providing us with at least 72 hours (3days) notice that you will need a refill. We will not refill narcotic prescription refill requests after 4:00pm on any weekday, Monday through Thursday, or after 2:00pm on Fridays, or on the weekends. We encourage everyone to explore another way of getting your prescription refill request processed using [employer], our patient web portal through our electronic medical record system. [employer] is an efficient and effective way to communicate your medication request directly to the office and  downloadable as an shane on your smart phone . [employer] also features a review functionality that allows you to view your medication list as well as leave messages for your physician. Are you ready to get connected? If so please review the attatched instructions or speak to any of our staff to get you set up right away! Thank you so much for your cooperation. Should you have any questions please contact our Practice Administrator.     The Physicians and Staff,  [facility] [email or web address]] Neurology Clinic

## 2021-06-09 ENCOUNTER — TELEPHONE (OUTPATIENT)
Dept: ONCOLOGY | Age: 56
End: 2021-06-09

## 2021-06-09 DIAGNOSIS — D75.839 THROMBOCYTOSIS: Primary | ICD-10-CM

## 2021-06-09 RX ORDER — HYDROXYUREA 500 MG/1
1000 CAPSULE ORAL DAILY
Qty: 180 CAPSULE | Refills: 2 | Status: SHIPPED | OUTPATIENT
Start: 2021-06-09 | End: 2022-02-09

## 2021-06-09 NOTE — TELEPHONE ENCOUNTER
Please schedule appt for October.     CHIB FROM DR Reid Mail HYDROXYUREA (HYDREA) 500MG CAP TAKE 2 CAPS BY MOUTH ONCE DAILY D 180 R 2

## 2021-10-12 ENCOUNTER — DOCUMENTATION ONLY (OUTPATIENT)
Dept: ONCOLOGY | Age: 56
End: 2021-10-12

## 2021-10-12 ENCOUNTER — TELEPHONE (OUTPATIENT)
Dept: ONCOLOGY | Age: 56
End: 2021-10-12

## 2021-10-12 NOTE — PROGRESS NOTES
Pt has an appt. Tomorrow. This RN called pt o inquire if her PCP completed recent labs on her. If not then we should reschedule appointment for tomorrow 10/13/21. No answer. LVM asking for return call.

## 2021-10-12 NOTE — TELEPHONE ENCOUNTER
Message taken off nurses . Pt returning call regarding lab work. Pt states she did not know she needed lab work done. Please call.   [phone]

## 2021-10-13 NOTE — PROGRESS NOTES
Ashok Martinez is a 60 y.o. female here for 6 month virtual visit follow up for thrombocytosis. She is on Hydrea. 1. Have you been to the ER, urgent care clinic since your last visit? Hospitalized since your last visit? no    2. Have you seen or consulted any other health care providers outside of the [address] [place] [address] since your last visit? Include any pap smears or colon screening. PCP    No concerns brought up.

## 2021-10-15 ENCOUNTER — VIRTUAL VISIT (OUTPATIENT)
Dept: ONCOLOGY | Age: 56
End: 2021-10-15
Payer: MEDICAID

## 2021-10-15 DIAGNOSIS — D47.3 ESSENTIAL THROMBOCYTOSIS (HCC): Primary | ICD-10-CM

## 2021-10-15 PROCEDURE — 99214 OFFICE O/P EST MOD 30 MIN: CPT | Performed by: INTERNAL MEDICINE

## 2021-10-15 NOTE — PROGRESS NOTES
[address] [facility] [address], [address] [facility], [address] [place] [address], [address] [facility]  [phone]         Progress Note      Patient: Megan Torres MRN: [id number]  SSN: [id number]    YOB: 1965  Age: 60 y.o. Sex: female        Reason for Visit:   Megan Torres is a 60 y.o. female who is seen by synchronous (real-time) audio-video technology for follow up of essential thrombocytosis. Subjective:      Megan Torres is a 60 y.o. female who I am seeing in follow up for thrombocytosis. She is admitted with subacute difficulties with visual processing, cognitive impairment, slurred speech, dizziness, headaches x 2-3 weeks. MRI Brain this admission reveals restricted diffusion and enhancement of the R parieto-occipital and R frontal territories with rather confluent T2 hyperintensity in a similar distribution on FLAIR. CTA H/N did not reveal any significant stenosis of LVO. She is taking Hydroxyurea and denies any side effects. 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 Father is on Hospice for end stage lung cancer.        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    Seizures ([address]     since age 1 last seizure 2006; on dilantin (drug level checked annually by PCP)    Stroke ([address]     Thrombocytosis ([address] 9/12/2019     Past Surgical History:   Procedure Laterality Date  HX BREAST BIOPSY Left     NEG    HX  SECTION      x 2    HX HEENT      LASIK    HX KNEE ARTHROSCOPY Bilateral     HX KNEE REPLACEMENT Right 2018    HX ORTHOPAEDIC Left     TKR    HX ORTHOPAEDIC Left     [address]    HX ORTHOPAEDIC Right     RTH    HX OTHER SURGICAL      BIOPSY LEFT LOWER LEG(AGE 23)    HX TONSILLECTOMY      HX TUBAL LIGATION        Family History   Problem Relation Age of Onset    Cancer Mother 48        breast cancer    Diabetes Mother     [de-identified] Problems Mother         PONV    Heart Disease Father     Hypertension Father     No Known Problems Brother      Social History     Tobacco Use    Smoking status: Never Smoker    Smokeless tobacco: Never Used   Substance Use Topics    Alcohol use: Yes     Comment: <1/wk      Prior to Admission medications    Medication Sig Start Date End Date Taking? Authorizing Provider   rosuvastatin (CRESTOR) 10 mg tablet Take 10 mg by mouth nightly. Yes Provider, Historical   LORazepam (Ativan) 0.5 mg tablet Take  by mouth. Yes Provider, Historical   aspirin (ASPIRIN) 325 mg tablet Take 1 Tab by mouth daily. 3/15/20  Yes Loco Gruber MD   phenytoin ER (Dilantin Extended) 100 mg ER capsule Take 300 mg by mouth Daily (before breakfast). Take with Dilantin 30 mg capsule for total daily dose of 330 mg every morning   Yes Provider, Historical   phenytoin ER (Dilantin) 30 mg ER capsule Take 30 mg by mouth Daily (before breakfast).  Take with Dilantin 300 mg capsule for total daily dose of 330 mg every morning    Yes Provider, Historical              No Known Allergies        Objective:     General: alert, cooperative, no distress   Mental  status: normal mood, behavior, speech, dress, motor activity, and thought processes, able to follow commands   HENT: NCAT   Neck: no visualized mass   Resp: no respiratory distress   Neuro: no gross deficits   Skin: no discoloration or lesions of concern on visible areas   Psychiatric: normal affect, consistent with stated mood, no evidence of hallucinations       Due to this being a TeleHealth evaluation (During BQCEX-42 public health emergency), many elements of the physical examination are unable to be assessed. Evaluation of the following organ systems was limited: Vitals/Constitutional/EENT/Resp/CV/GI//MS/Neuro/Skin/Heme-Lymph-Imm. Lab Results   Component Value Date/Time    WBC 5.7 10/28/2020 12:00 AM    HGB 13.0 10/28/2020 12:00 AM    HCT 37.4 10/28/2020 12:00 AM    PLATELET 341 (H) 77/35/6944 12:00 AM     (H) 10/28/2020 12:00 AM             Assessment:     1. Essential Thrombocytosis    Solomon 2 [id number] +ve  High risk   On ASA  Recent CVA. On Hydroxyurea 100 mg daily  Tolerating well  Platelet count remains high  I asked to increase the dose of Hydrea      Plan:        1. Increase Hydrea 1000 mg /day for 4 days and 1500 mg/day for 3 days a week  2. Continue ASA  3. CBC in 3 and 6 month  4. RTC in 6 months      Signed by: Jaja Vanessa MD                     October 15, 2021      CC. Raffaele Batres MD      The patient was evaluated through a synchronous (real-time) audio-video encounter. The patient (or guardian if applicable) is aware that this is a billable service. Verbal consent to proceed has been obtained within the past 12 months. The visit was conducted pursuant to the emergency declaration under the [address] [place] [address], [address] [place] [address] authority and the [employer] Act. Patient identification was verified, and a caregiver was present when appropriate. The patient was located in a state where the provider was credentialed to provide care.

## 2022-02-09 DIAGNOSIS — D75.839 THROMBOCYTOSIS: ICD-10-CM

## 2022-02-09 RX ORDER — HYDROXYUREA 500 MG/1
1000 CAPSULE ORAL DAILY
Qty: 180 CAPSULE | Refills: 2 | Status: SHIPPED | OUTPATIENT
Start: 2022-02-09 | End: 2022-05-10

## 2022-02-09 NOTE — TELEPHONE ENCOUNTER
VORB per provider, refill approved. Requested Prescriptions   Pending Prescriptions Disp Refills    hydroxyurea (HYDREA) 500 mg capsule [Pharmacy Med Name: HYDROXYUREA 500 MG CAPSULE] 180 Capsule 2     Sig: TAKE 2 CAPSULES BY MOUTH DAILY FOR 90 DAYS.

## 2022-03-18 PROBLEM — M16.11 PRIMARY LOCALIZED OSTEOARTHRITIS OF RIGHT HIP: Status: ACTIVE | Noted: 2017-02-02

## 2022-03-18 PROBLEM — I63.9 CVA (CEREBRAL VASCULAR ACCIDENT) (HCC): Status: ACTIVE | Noted: 2020-03-14

## 2022-03-18 PROBLEM — M17.11 PRIMARY LOCALIZED OSTEOARTHRITIS OF RIGHT KNEE: Status: ACTIVE | Noted: 2018-04-12

## 2022-03-19 PROBLEM — D75.839 THROMBOCYTOSIS: Status: ACTIVE | Noted: 2019-09-12

## 2022-03-19 PROBLEM — Z22.322 MRSA CARRIER: Status: ACTIVE | Noted: 2019-09-13

## 2022-03-19 PROBLEM — M48.061 SPINAL STENOSIS, LUMBAR: Status: ACTIVE | Noted: 2019-09-18

## 2022-03-19 PROBLEM — M48.061 LUMBAR STENOSIS: Status: ACTIVE | Noted: 2019-09-18

## 2022-04-15 ENCOUNTER — VIRTUAL VISIT (OUTPATIENT)
Dept: ONCOLOGY | Age: 57
End: 2022-04-15

## 2022-04-15 DIAGNOSIS — D47.3 ESSENTIAL THROMBOCYTOSIS (HCC): Primary | ICD-10-CM

## 2022-05-05 NOTE — PROGRESS NOTES
Arin Burgos is a 60 y.o. female here for 6 month virtual visit follow up for thrombocytosis. She is on Hydrea. 1. Have you been to the ER, urgent care clinic since your last visit? Hospitalized since your last visit? 2. Have you seen or consulted any other health care providers outside of the [address] [place] [address] since your last visit? Include any pap smears or colon screening.

## 2022-05-06 ENCOUNTER — VIRTUAL VISIT (OUTPATIENT)
Dept: ONCOLOGY | Age: 57
End: 2022-05-06
Payer: MEDICAID

## 2022-05-06 DIAGNOSIS — D47.3 ESSENTIAL THROMBOCYTOSIS (HCC): Primary | ICD-10-CM

## 2022-05-06 DIAGNOSIS — Z51.11 CHEMOTHERAPY MANAGEMENT, ENCOUNTER FOR: ICD-10-CM

## 2022-05-06 PROCEDURE — 99214 OFFICE O/P EST MOD 30 MIN: CPT | Performed by: INTERNAL MEDICINE

## 2022-05-06 NOTE — PROGRESS NOTES
[facility] [address], [address] [facility], [address] [place] [address], [address] [place] [address]  [phone]         Progress Note      Patient: Slime Coon MRN: [id number]  SSN: [id number]    YOB: 1965  Age: 60 y.o. Sex: female        Reason for Visit:   Slime Coon is a 60 y.o. female who is seen by synchronous (real-time) audio-video technology for follow up of essential thrombocytosis. Subjective:      Slime Coon is a 60 y.o. female who I am seeing in follow up for thrombocytosis. She is admitted with subacute difficulties with visual processing, cognitive impairment, slurred speech, dizziness, headaches x 2-3 weeks. MRI Brain this admission reveals restricted diffusion and enhancement of the R parieto-occipital and R frontal territories with rather confluent T2 hyperintensity in a similar distribution on FLAIR. CTA H/N did not reveal any significant stenosis of LVO. She is taking Hydroxyurea and denies any side effects.        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    Seizures ([address]     since age 1 last seizure ; on dilantin (drug level checked annually by PCP)    Stroke ([address]     Thrombocytosis ([address] 2019     Past Surgical History:   Procedure Laterality Date    HX BREAST BIOPSY Left     NEG    HX  SECTION      x 2    HX HEENT      LASIK    HX KNEE ARTHROSCOPY Bilateral     HX KNEE REPLACEMENT Right 04/2018    HX ORTHOPAEDIC Left 2013    TKR    HX ORTHOPAEDIC Left 2016    [address]    HX ORTHOPAEDIC Right     RTH    HX OTHER SURGICAL      BIOPSY LEFT LOWER LEG(AGE 23)    HX TONSILLECTOMY      HX TUBAL LIGATION        Family History   Problem Relation Age of Onset    Cancer Mother 48        breast cancer    Diabetes Mother     [de-identified] Problems Mother         PONV    Heart Disease Father     Hypertension Father     No Known Problems Brother      Social History     Tobacco Use    Smoking status: Never Smoker    Smokeless tobacco: Never Used   Substance Use Topics    Alcohol use: Yes     Comment: <1/wk      Prior to Admission medications    Medication Sig Start Date End Date Taking? Authorizing Provider   hydroxyurea (HYDREA) 500 mg capsule TAKE 2 CAPSULES BY MOUTH DAILY FOR 90 DAYS. 2/9/22 5/10/22  Harry Hazel MD   rosuvastatin (CRESTOR) 10 mg tablet Take 10 mg by mouth nightly. Provider, Historical   LORazepam (Ativan) 0.5 mg tablet Take  by mouth. Provider, Historical   aspirin (ASPIRIN) 325 mg tablet Take 1 Tab by mouth daily. 3/15/20   Juan Garcia MD   phenytoin ER (Dilantin Extended) 100 mg ER capsule Take 300 mg by mouth Daily (before breakfast). Take with Dilantin 30 mg capsule for total daily dose of 330 mg every morning    Provider, Historical   phenytoin ER (Dilantin) 30 mg ER capsule Take 30 mg by mouth Daily (before breakfast).  Take with Dilantin 300 mg capsule for total daily dose of 330 mg every morning     Provider, Historical              No Known Allergies        Objective:     General: alert, cooperative, no distress   Mental  status: normal mood, behavior, speech, dress, motor activity, and thought processes, able to follow commands   HENT: NCAT   Neck: no visualized mass   Resp: no respiratory distress   Neuro: no gross deficits   Skin: no discoloration or lesions of concern on visible areas   Psychiatric: normal affect, consistent with stated mood, no evidence of hallucinations       Due to this being a TeleHealth evaluation (During MAQJU-14 public health emergency), many elements of the physical examination are unable to be assessed. Evaluation of the following organ systems was limited: Vitals/Constitutional/EENT/Resp/CV/GI//MS/Neuro/Skin/Heme-Lymph-Imm. External labs reviewed    Hgb: 13.2  Plt: 340        Assessment:     1. Essential Thrombocytosis    Solomon 2 [id number] +ve  High risk   On ASA  Recent CVA. On Hydroxyurea  Tolerating well  Platelet count is in therapeutic range    Tolerating treatment very well  Denies any side effects. A detailed system by system evaluation of side effect was performed to assess adverse events. Blood counts are acceptable. Results reviewed with the patient        Plan:        1. Hydrea 1000 mg /day for 4 days and 1500 mg/day for 3 days a week  2. Continue ASA  3. CBC in 3 and 6 month  4. RTC in 6 months      Signed by: Lucas Ceron MD                     May 6, 2022      CC. Ying Mcconnell MD      The patient was evaluated through a synchronous (real-time) audio-video encounter. The patient (or guardian if applicable) is aware that this is a billable service. Verbal consent to proceed has been obtained within the past 12 months. The visit was conducted pursuant to the emergency declaration under the [address] [place] [address], [address] [place] [address] authority and the [employer] General Act. Patient identification was verified, and a caregiver was present when appropriate. The patient was located in a state where the provider was credentialed to provide care.

## 2022-05-06 NOTE — PROGRESS NOTES
Pt is taking hydrea every day. She had recent labs done. 1. Have you been to the ER, urgent care clinic since your last visit? Hospitalized since your last visit? No    2. Have you seen or consulted any other health care providers outside of the [address] [place] [address] since your last visit? Include any pap smears or colon screening.  PCP routine follow up

## 2022-06-15 ENCOUNTER — TELEPHONE (OUTPATIENT)
Dept: ONCOLOGY | Age: 57
End: 2022-06-15

## 2022-06-15 DIAGNOSIS — D75.839 THROMBOCYTOSIS: Primary | ICD-10-CM

## 2022-06-15 RX ORDER — HYDROXYUREA 500 MG/1
CAPSULE ORAL
Qty: 225 CAPSULE | Refills: 1 | Status: SHIPPED | OUTPATIENT
Start: 2022-06-15

## 2022-06-15 NOTE — TELEPHONE ENCOUNTER
Called pt back to clarify if she is still doing 1000mg 4 days a week and 1500mg t3 days. Left VM asking for a call back.

## 2022-06-15 NOTE — TELEPHONE ENCOUNTER
Patient needs refill on Hydroxyurea. She will run out this weekend and is also leaving for vacation. States she takes 3 every other day and needs to make sure she has enough to get her through.  Please call in to [facility].

## 2022-06-15 NOTE — TELEPHONE ENCOUNTER
Pt returned call. This RN called pt back again, no answer and can't leave VM. Will send over Rx now so she can get it before vacation.     Requested Prescriptions     Pending Prescriptions Disp Refills    hydroxyurea (Hydrea) 500 mg capsule 225 Capsule 1     Sig: Take 2 caps by mouth on M,W,FRI,SUN and 3 caps on T,TH,SAT

## 2022-06-26 ENCOUNTER — HOSPITAL ENCOUNTER (EMERGENCY)
Age: 57
Discharge: HOME | End: 2022-06-26
Attending: EMERGENCY MEDICINE
Payer: COMMERCIAL

## 2022-06-26 ENCOUNTER — APPOINTMENT (OUTPATIENT)
Dept: GENERAL RADIOLOGY | Age: 57
End: 2022-06-26
Attending: EMERGENCY MEDICINE
Payer: COMMERCIAL

## 2022-06-26 VITALS
RESPIRATION RATE: 18 BRPM | OXYGEN SATURATION: 100 % | HEART RATE: 60 BPM | SYSTOLIC BLOOD PRESSURE: 126 MMHG | HEIGHT: 68 IN | DIASTOLIC BLOOD PRESSURE: 61 MMHG | BODY MASS INDEX: 44.41 KG/M2 | WEIGHT: 293 LBS | TEMPERATURE: 97.8 F

## 2022-06-26 DIAGNOSIS — S20.211A CONTUSION OF RIB ON RIGHT SIDE, INITIAL ENCOUNTER: Primary | ICD-10-CM

## 2022-06-26 PROCEDURE — 71101 X-RAY EXAM UNILAT RIBS/CHEST: CPT

## 2022-06-26 PROCEDURE — 74011250637 HC RX REV CODE- 250/637: Performed by: EMERGENCY MEDICINE

## 2022-06-26 PROCEDURE — 99283 EMERGENCY DEPT VISIT LOW MDM: CPT

## 2022-06-26 RX ORDER — OXYCODONE AND ACETAMINOPHEN 5; 325 MG/1; MG/1
1 TABLET ORAL
Qty: 12 TABLET | Refills: 0 | Status: SHIPPED | OUTPATIENT
Start: 2022-06-26 | End: 2022-06-29

## 2022-06-26 RX ORDER — OXYCODONE AND ACETAMINOPHEN 5; 325 MG/1; MG/1
2 TABLET ORAL
Status: COMPLETED | OUTPATIENT
Start: 2022-06-26 | End: 2022-06-26

## 2022-06-26 RX ADMIN — OXYCODONE HYDROCHLORIDE AND ACETAMINOPHEN 2 TABLET: 5; 325 TABLET ORAL at 18:51

## 2022-06-26 NOTE — ED TRIAGE NOTES
PATIENT FELL IN A STORE APPROX AROUND 12PM IN [facility] HEAD; ON THE WAY; BECAME SHORT OF BREATH; PAIN RIGHT SIDE RIB CAGE AREA. PAIN 9/10.  REPORTS SHORT OF BREATH AT REST AND ON EXERTION

## 2022-06-26 NOTE — ED PROVIDER NOTES
HPI   Patient reports experiencing mechanical fall at about noon today. She fell in a store onto her right side into an object breaking the object and falling to the floor. She reports pain in the right forearm and right lateral chest wall since then. Denies shortness of breath but reports increased pain with inspiration and movement of the chest wall. No head, neck, or abdominal injury or trauma reported.   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    Seizures [facility])     since age 1 last seizure 2006; on dilantin (drug level checked annually by PCP)    Stroke ([facility]     Thrombocytosis ([facility] 2019       Past Surgical History:   Procedure Laterality Date    HX BREAST BIOPSY Left     NEG    HX  SECTION      x 2    HX HEENT      LASIK    HX KNEE ARTHROSCOPY Bilateral     HX KNEE REPLACEMENT Right 2018    HX ORTHOPAEDIC Left     TKR    HX ORTHOPAEDIC Left     [address]    HX ORTHOPAEDIC Right     RTH    HX OTHER SURGICAL      BIOPSY LEFT LOWER LEG(AGE 23)    HX TONSILLECTOMY      HX TUBAL LIGATION           Family History:   Problem Relation Age of Onset    Cancer Mother 48        breast cancer    Diabetes Mother     [de-identified] Problems Mother         PONV    Heart Disease Father     Hypertension Father     No Known Problems Brother        Social History     Socioeconomic History    Marital status:      Spouse name: Not on file    Number of children: Not on file    Years of education: Not on file    Highest education level: Not on file   Occupational History    Not on file   Tobacco Use    Smoking status: Never Smoker    Smokeless tobacco: Never Used   Substance and Sexual Activity    Alcohol use: Yes     Comment: <1/wk    Drug use: No    Sexual activity: Not on file   Other Topics Concern    Not on file   Social History Narrative    Not on file     Social Determinants of Health     Financial Resource Strain:     Difficulty of Paying Living Expenses: Not on file   Food Insecurity:     Worried About Running Out of Food in the Last Year: Not on file    Jessica of Food in the Last Year: Not on file   Transportation Needs:     Lack of Transportation (Medical): Not on file    Lack of Transportation (Non-Medical): Not on file   Physical Activity:     Days of Exercise per Week: Not on file    Minutes of Exercise per Session: Not on file   Stress:     Feeling of Stress : Not on file   Social Connections:     Frequency of Communication with Friends and Family: Not on file    Frequency of Social Gatherings with Friends and Family: Not on file    Attends Jew Services: Not on file    Active Member of [address] [place] [address] or Organizations: Not on file    Attends Club or Organization Meetings: Not on file    Marital Status: Not on file   Intimate Partner Violence:     Fear of Current or Ex-Partner: Not on file    Emotionally Abused: Not on file    Physically Abused: Not on file    Sexually Abused: Not on file   Housing Stability:     Unable to Pay for Housing in the Last Year: Not on file    Number of [place] in the Last Year: Not on file    Unstable Housing in the Last Year: Not on file         ALLERGIES: Patient has no known allergies. Review of Systems   Constitutional: Negative. HENT: Negative. Eyes: Negative. Respiratory: Negative. Cardiovascular: Negative. Gastrointestinal: Negative. Endocrine: Negative. Genitourinary: Negative. Musculoskeletal: Positive for arthralgias and myalgias. Allergic/Immunologic: Negative. Neurological: Negative. Hematological: Negative. Psychiatric/Behavioral: Negative. All other systems reviewed and are negative.       Vitals:    06/26/22 1815   BP: 135/83   Pulse: 67   Resp: 22   Temp: 97.8 °F (36.6 °C) SpO2: 98%   Weight: 140.2 kg (309 lb)   Height: 5' 8\" (1.727 m)            Physical Exam  Vitals and nursing note reviewed. Constitutional:       General: She is not in acute distress. Appearance: Normal appearance. She is obese. She is not ill-appearing, toxic-appearing or diaphoretic. HENT:      Head: Normocephalic and atraumatic. Nose: Nose normal.      Mouth/Throat:      Mouth: Mucous membranes are moist.      Pharynx: Oropharynx is clear. Eyes:      Extraocular Movements: Extraocular movements intact. Conjunctiva/sclera: Conjunctivae normal.      Pupils: Pupils are equal, round, and reactive to light. Cardiovascular:      Rate and Rhythm: Normal rate and regular rhythm. Pulses: Normal pulses. Heart sounds: Normal heart sounds. No murmur heard. No friction rub. No gallop. Pulmonary:      Effort: Pulmonary effort is normal. No respiratory distress. Breath sounds: Normal breath sounds. No stridor. No wheezing, rhonchi or rales. Chest:      Chest wall: No tenderness. Abdominal:      Tenderness: There is no right CVA tenderness or left CVA tenderness. Musculoskeletal:         General: No swelling, tenderness, deformity or signs of injury. Normal range of motion. Cervical back: Normal range of motion. No rigidity or tenderness. Right lower leg: No edema. Left lower leg: No edema. Skin:     General: Skin is warm and dry. Capillary Refill: Capillary refill takes less than 2 seconds. Coloration: Skin is not jaundiced or pale. Findings: Bruising present. No erythema, lesion or rash. Comments: Scattered mild ecchymosis on the right forearm. Neurological:      General: No focal deficit present. Mental Status: She is alert and oriented to person, place, and time. Mental status is at baseline. Cranial Nerves: No cranial nerve deficit. Sensory: No sensory deficit. Motor: No weakness.       Coordination: Coordination normal.   Psychiatric:         Mood and Affect: Mood normal.         Behavior: Behavior normal.          MDM     Breath sounds are equal and I do not suspect pneumothorax. Will check x-ray for possible rib fracture. Likely discharge with pain control.   Procedures

## 2022-12-06 DIAGNOSIS — D75.839 THROMBOCYTOSIS: ICD-10-CM

## 2022-12-06 RX ORDER — HYDROXYUREA 500 MG/1
CAPSULE ORAL
Qty: 204 CAPSULE | Refills: 2 | Status: SHIPPED | OUTPATIENT
Start: 2022-12-06

## 2023-02-03 DIAGNOSIS — D75.839 THROMBOCYTOSIS: ICD-10-CM

## 2023-02-06 RX ORDER — HYDROXYUREA 500 MG/1
CAPSULE ORAL
Qty: 45 CAPSULE | Refills: 5 | OUTPATIENT
Start: 2023-02-06

## 2023-02-06 RX ORDER — HYDROXYUREA 500 MG/1
CAPSULE ORAL
Qty: 204 CAPSULE | Refills: 2 | Status: SHIPPED | OUTPATIENT
Start: 2023-02-06

## 2023-02-06 RX ORDER — HYDROXYUREA 500 MG/1
CAPSULE ORAL
Qty: 60 CAPSULE | Refills: 5 | OUTPATIENT
Start: 2023-02-06

## 2023-02-06 NOTE — TELEPHONE ENCOUNTER
Prescription hydrea needing to be updated. Verbal provided to pharmacy at this time. Updated the Rx.

## 2023-04-17 DIAGNOSIS — D47.3 ESSENTIAL THROMBOCYTOSIS (HCC): Primary | ICD-10-CM

## 2023-04-18 DIAGNOSIS — D75.839 THROMBOCYTOSIS: ICD-10-CM

## 2023-04-18 RX ORDER — HYDROXYUREA 500 MG/1
CAPSULE ORAL
Qty: 45 CAPSULE | Refills: 5 | Status: ON HOLD | OUTPATIENT
Start: 2023-04-18

## 2023-04-23 ENCOUNTER — APPOINTMENT (OUTPATIENT)
Dept: GENERAL RADIOLOGY | Age: 58
DRG: 191 | End: 2023-04-23
Attending: EMERGENCY MEDICINE
Payer: MEDICARE

## 2023-04-23 ENCOUNTER — HOSPITAL ENCOUNTER (INPATIENT)
Age: 58
LOS: 2 days | Discharge: HOME | DRG: 191 | End: 2023-04-26
Attending: EMERGENCY MEDICINE | Admitting: INTERNAL MEDICINE
Payer: MEDICARE

## 2023-04-23 ENCOUNTER — APPOINTMENT (OUTPATIENT)
Dept: CT IMAGING | Age: 58
DRG: 191 | End: 2023-04-23
Attending: HOSPITALIST
Payer: MEDICARE

## 2023-04-23 DIAGNOSIS — J45.41 MODERATE PERSISTENT ASTHMA WITH ACUTE EXACERBATION: Primary | ICD-10-CM

## 2023-04-23 DIAGNOSIS — J44.1 COPD WITH ACUTE EXACERBATION (HCC): ICD-10-CM

## 2023-04-23 PROBLEM — R05.8 UPPER AIRWAY COUGH SYNDROME: Status: ACTIVE | Noted: 2023-04-23

## 2023-04-23 PROBLEM — D75.839 THROMBOCYTOSIS: Status: ACTIVE | Noted: 2023-04-23

## 2023-04-23 LAB
ALBUMIN SERPL-MCNC: 3.8 G/DL (ref 3.5–5)
ALBUMIN/GLOB SERPL: 1.2 (ref 1.1–2.2)
ALP SERPL-CCNC: 86 U/L (ref 45–117)
ALT SERPL-CCNC: 27 U/L (ref 12–78)
ANION GAP SERPL CALC-SCNC: 8 MMOL/L (ref 5–15)
AST SERPL W P-5'-P-CCNC: 20 U/L (ref 15–37)
BASOPHILS # BLD: 0 K/UL (ref 0–0.1)
BASOPHILS NFR BLD: 0 % (ref 0–1)
BILIRUB SERPL-MCNC: 0.3 MG/DL (ref 0.2–1)
BUN SERPL-MCNC: 14 MG/DL (ref 6–20)
BUN/CREAT SERPL: 18 (ref 12–20)
CA-I BLD-MCNC: 8.6 MG/DL (ref 8.5–10.1)
CHLORIDE SERPL-SCNC: 104 MMOL/L (ref 97–108)
CO2 SERPL-SCNC: 28 MMOL/L (ref 21–32)
CREAT SERPL-MCNC: 0.8 MG/DL (ref 0.55–1.02)
D DIMER PPP FEU-MCNC: <0.27 UG/ML(FEU)
DIFFERENTIAL METHOD BLD: ABNORMAL
EOSINOPHIL # BLD: 0.1 K/UL (ref 0–0.4)
EOSINOPHIL NFR BLD: 2 % (ref 0–7)
ERYTHROCYTE [DISTWIDTH] IN BLOOD BY AUTOMATED COUNT: 15.7 % (ref 11.5–14.5)
GLOBULIN SER CALC-MCNC: 3.2 G/DL (ref 2–4)
GLUCOSE SERPL-MCNC: 109 MG/DL (ref 65–100)
HCT VFR BLD AUTO: 35.9 % (ref 35–47)
HGB BLD-MCNC: 12 G/DL (ref 11.5–16)
IMM GRANULOCYTES # BLD AUTO: 0 K/UL (ref 0–0.04)
IMM GRANULOCYTES NFR BLD AUTO: 0 % (ref 0–0.5)
INR PPP: 1.2 (ref 0.9–1.1)
LYMPHOCYTES # BLD: 0.7 K/UL (ref 0.8–3.5)
LYMPHOCYTES NFR BLD: 28 % (ref 12–49)
MCH RBC QN AUTO: 36.9 PG (ref 26–34)
MCHC RBC AUTO-ENTMCNC: 33.4 G/DL (ref 30–36.5)
MCV RBC AUTO: 110.5 FL (ref 80–99)
MONOCYTES # BLD: 0.3 K/UL (ref 0–1)
MONOCYTES NFR BLD: 12 % (ref 5–13)
NEUTS SEG # BLD: 1.3 K/UL (ref 1.8–8)
NEUTS SEG NFR BLD: 58 % (ref 32–75)
NRBC # BLD: 0 K/UL (ref 0–0.01)
NRBC BLD-RTO: 0 PER 100 WBC
PLATELET # BLD AUTO: 351 K/UL (ref 150–400)
PMV BLD AUTO: 11.6 FL (ref 8.9–12.9)
POTASSIUM SERPL-SCNC: 3.6 MMOL/L (ref 3.5–5.1)
PROT SERPL-MCNC: 7 G/DL (ref 6.4–8.2)
PROTHROMBIN TIME: 14.8 SEC (ref 11.9–14.6)
RBC # BLD AUTO: 3.25 M/UL (ref 3.8–5.2)
SODIUM SERPL-SCNC: 140 MMOL/L (ref 136–145)
WBC # BLD AUTO: 2.3 K/UL (ref 3.6–11)

## 2023-04-23 PROCEDURE — 96374 THER/PROPH/DIAG INJ IV PUSH: CPT

## 2023-04-23 PROCEDURE — 96375 TX/PRO/DX INJ NEW DRUG ADDON: CPT

## 2023-04-23 PROCEDURE — 99285 EMERGENCY DEPT VISIT HI MDM: CPT

## 2023-04-23 PROCEDURE — 74011250637 HC RX REV CODE- 250/637: Performed by: EMERGENCY MEDICINE

## 2023-04-23 PROCEDURE — 80053 COMPREHEN METABOLIC PANEL: CPT

## 2023-04-23 PROCEDURE — 70490 CT SOFT TISSUE NECK W/O DYE: CPT

## 2023-04-23 PROCEDURE — 36415 COLL VENOUS BLD VENIPUNCTURE: CPT

## 2023-04-23 PROCEDURE — 94640 AIRWAY INHALATION TREATMENT: CPT

## 2023-04-23 PROCEDURE — 74011000250 HC RX REV CODE- 250: Performed by: EMERGENCY MEDICINE

## 2023-04-23 PROCEDURE — 96372 THER/PROPH/DIAG INJ SC/IM: CPT

## 2023-04-23 PROCEDURE — 85610 PROTHROMBIN TIME: CPT

## 2023-04-23 PROCEDURE — 85379 FIBRIN DEGRADATION QUANT: CPT

## 2023-04-23 PROCEDURE — 74011250636 HC RX REV CODE- 250/636: Performed by: EMERGENCY MEDICINE

## 2023-04-23 PROCEDURE — 74011000250 HC RX REV CODE- 250: Performed by: INTERNAL MEDICINE

## 2023-04-23 PROCEDURE — 71045 X-RAY EXAM CHEST 1 VIEW: CPT

## 2023-04-23 PROCEDURE — G0378 HOSPITAL OBSERVATION PER HR: HCPCS

## 2023-04-23 PROCEDURE — 85025 COMPLETE CBC W/AUTO DIFF WBC: CPT

## 2023-04-23 PROCEDURE — 74011250636 HC RX REV CODE- 250/636: Performed by: INTERNAL MEDICINE

## 2023-04-23 RX ORDER — ENOXAPARIN SODIUM 100 MG/ML
30 INJECTION SUBCUTANEOUS EVERY 12 HOURS
Status: DISCONTINUED | OUTPATIENT
Start: 2023-04-23 | End: 2023-04-23

## 2023-04-23 RX ORDER — BENZONATATE 100 MG/1
200 CAPSULE ORAL
Status: COMPLETED | OUTPATIENT
Start: 2023-04-23 | End: 2023-04-23

## 2023-04-23 RX ORDER — SODIUM CHLORIDE 0.9 % (FLUSH) 0.9 %
5-40 SYRINGE (ML) INJECTION AS NEEDED
Status: DISCONTINUED | OUTPATIENT
Start: 2023-04-23 | End: 2023-04-23

## 2023-04-23 RX ORDER — IPRATROPIUM BROMIDE AND ALBUTEROL SULFATE 2.5; .5 MG/3ML; MG/3ML
3 SOLUTION RESPIRATORY (INHALATION)
Status: COMPLETED | OUTPATIENT
Start: 2023-04-23 | End: 2023-04-23

## 2023-04-23 RX ORDER — ONDANSETRON 4 MG/1
4 TABLET, ORALLY DISINTEGRATING ORAL
Status: DISCONTINUED | OUTPATIENT
Start: 2023-04-23 | End: 2023-04-26 | Stop reason: HOSPADM

## 2023-04-23 RX ORDER — SODIUM CHLORIDE 0.9 % (FLUSH) 0.9 %
5-40 SYRINGE (ML) INJECTION EVERY 8 HOURS
Status: DISCONTINUED | OUTPATIENT
Start: 2023-04-23 | End: 2023-04-23

## 2023-04-23 RX ORDER — POLYETHYLENE GLYCOL 3350 17 G/17G
17 POWDER, FOR SOLUTION ORAL DAILY PRN
Status: DISCONTINUED | OUTPATIENT
Start: 2023-04-23 | End: 2023-04-24

## 2023-04-23 RX ORDER — ACETAMINOPHEN 325 MG/1
650 TABLET ORAL
Status: DISCONTINUED | OUTPATIENT
Start: 2023-04-23 | End: 2023-04-26 | Stop reason: HOSPADM

## 2023-04-23 RX ORDER — ONDANSETRON 2 MG/ML
4 INJECTION INTRAMUSCULAR; INTRAVENOUS
Status: DISCONTINUED | OUTPATIENT
Start: 2023-04-23 | End: 2023-04-23

## 2023-04-23 RX ORDER — KETOROLAC TROMETHAMINE 30 MG/ML
30 INJECTION, SOLUTION INTRAMUSCULAR; INTRAVENOUS ONCE
Status: COMPLETED | OUTPATIENT
Start: 2023-04-23 | End: 2023-04-23

## 2023-04-23 RX ORDER — MAGNESIUM SULFATE HEPTAHYDRATE 40 MG/ML
2 INJECTION, SOLUTION INTRAVENOUS
Status: COMPLETED | OUTPATIENT
Start: 2023-04-23 | End: 2023-04-23

## 2023-04-23 RX ORDER — ACETAMINOPHEN 650 MG/1
650 SUPPOSITORY RECTAL
Status: DISCONTINUED | OUTPATIENT
Start: 2023-04-23 | End: 2023-04-23

## 2023-04-23 RX ADMIN — IPRATROPIUM BROMIDE AND ALBUTEROL SULFATE 3 ML: .5; 3 SOLUTION RESPIRATORY (INHALATION) at 17:20

## 2023-04-23 RX ADMIN — MAGNESIUM SULFATE HEPTAHYDRATE 2 G: 40 INJECTION, SOLUTION INTRAVENOUS at 18:31

## 2023-04-23 RX ADMIN — SODIUM CHLORIDE, PRESERVATIVE FREE 10 ML: 5 INJECTION INTRAVENOUS at 21:44

## 2023-04-23 RX ADMIN — SODIUM CHLORIDE 1000 ML: 9 INJECTION, SOLUTION INTRAVENOUS at 17:55

## 2023-04-23 RX ADMIN — IPRATROPIUM BROMIDE AND ALBUTEROL SULFATE 3 ML: .5; 3 SOLUTION RESPIRATORY (INHALATION) at 18:52

## 2023-04-23 RX ADMIN — IPRATROPIUM BROMIDE AND ALBUTEROL SULFATE 3 ML: .5; 3 SOLUTION RESPIRATORY (INHALATION) at 18:03

## 2023-04-23 RX ADMIN — ENOXAPARIN SODIUM 30 MG: 40 INJECTION SUBCUTANEOUS at 21:42

## 2023-04-23 RX ADMIN — BENZONATATE 200 MG: 100 CAPSULE ORAL at 17:54

## 2023-04-23 RX ADMIN — METHYLPREDNISOLONE SODIUM SUCCINATE 125 MG: 125 INJECTION INTRAMUSCULAR; INTRAVENOUS at 17:18

## 2023-04-23 RX ADMIN — KETOROLAC TROMETHAMINE 30 MG: 30 INJECTION, SOLUTION INTRAMUSCULAR at 18:31

## 2023-04-23 NOTE — ED PROVIDER NOTES
[facility] EMERGENCY DEPT  EMERGENCY DEPARTMENT HISTORY AND PHYSICAL EXAM      Date: 2023  Patient Name: Branden Blackwood  MRN: [id number]  [place]: 1965  Date of evaluation: 2023  Provider: Len Cardoso MD   Note Started: 4:57 PM 23    HISTORY OF PRESENT ILLNESS     Chief Complaint   Patient presents with    Cough       History Provided By: Patient    HPI: Branden Blackwood is a 59 y.o. female     PAST MEDICAL HISTORY   Past Medical History:  Past Medical History:   Diagnosis Date    Arthritis     Chronic pain     bilateral hips and right knee    Morbid obesity with BMI of 45.0-49.9, adult ([facility] 2016    Motion sickness     when not eating    MRSA carrier 2019    Seizures ([facility]     since age 1 last seizure ; on dilantin (drug level checked annually by PCP)    Stroke ([facility]     Thrombocytosis 2019       Past Surgical History:  Past Surgical History:   Procedure Laterality Date    HX BREAST BIOPSY Left     NEG    HX  SECTION      x 2    HX HEENT      LASIK    HX KNEE ARTHROSCOPY Bilateral     HX KNEE REPLACEMENT Right 2018    HX ORTHOPAEDIC Left     TKR    HX ORTHOPAEDIC Left     [address]    HX ORTHOPAEDIC Right     RTH    HX OTHER SURGICAL      BIOPSY LEFT LOWER LEG(AGE 23)    HX TONSILLECTOMY      HX TUBAL LIGATION         Family History:  Family History   Problem Relation Age of Onset    Cancer Mother 48        breast cancer    Diabetes Mother     Anesth Problems Mother         PONV    Heart Disease Father     Hypertension Father     No Known Problems Brother        Social History:  Social History     Tobacco Use    Smoking status: Never    Smokeless tobacco: Never   Substance Use Topics    Alcohol use: Yes     Comment: <1/wk    Drug use: No       Allergies:  No Known Allergies    PCP: Sami Aguilar PA-C    Current Meds:   Previous Medications    ASPIRIN (ASPIRIN) 325 MG TABLET    Take 1 Tab by mouth daily.     HYDROXYUREA (HYDREA) 500 MG CAPSULE    THREE CAPSULES BY MOUTH EVERY OTHER DAY AT BEDTIME - LEUKEMIA    LORAZEPAM (ATIVAN) 0.5 MG TABLET    Take  by mouth. PHENYTOIN ER (DILANTIN EXTENDED) 100 MG ER CAPSULE    Take 300 mg by mouth Daily (before breakfast). Take with Dilantin 30 mg capsule for total daily dose of 330 mg every morning    PHENYTOIN ER (DILANTIN) 30 MG ER CAPSULE    Take 30 mg by mouth Daily (before breakfast). Take with Dilantin 300 mg capsule for total daily dose of 330 mg every morning     ROSUVASTATIN (CRESTOR) 10 MG TABLET    Take 10 mg by mouth nightly. PHYSICAL EXAM     ED Triage Vitals [04/23/23 1636]   ED Encounter Vitals Group      BP (!) 174/91      Pulse (Heart Rate) 81      Resp Rate 19      Temp       Temp src       O2 Sat (%) 95 %      Weight 309 lb      Height 5' 8\"      Physical Exam  Vitals and nursing note reviewed. Constitutional:       General: She is in acute distress. Appearance: She is not ill-appearing, toxic-appearing or diaphoretic. HENT:      Head: Normocephalic and atraumatic. Right Ear: Tympanic membrane normal.      Left Ear: Tympanic membrane normal.      Nose: Nose normal. No congestion. Mouth/Throat:      Mouth: Mucous membranes are moist.      Pharynx: Oropharynx is clear. Eyes:      Extraocular Movements: Extraocular movements intact. Conjunctiva/sclera: Conjunctivae normal.      Pupils: Pupils are equal, round, and reactive to light. Cardiovascular:      Rate and Rhythm: Normal rate and regular rhythm. Pulses: Normal pulses. Heart sounds: Normal heart sounds. Pulmonary:      Effort: Pulmonary effort is normal.      Breath sounds: Normal breath sounds. Abdominal:      General: Bowel sounds are normal.      Palpations: Abdomen is soft. Tenderness: There is no abdominal tenderness. Musculoskeletal:         General: No tenderness, deformity or signs of injury. Normal range of motion. Cervical back: Normal range of motion and neck supple.  No rigidity or tenderness. Lymphadenopathy:      Cervical: No cervical adenopathy. Skin:     General: Skin is warm and dry. Capillary Refill: Capillary refill takes less than 2 seconds. Findings: No rash. Neurological:      General: No focal deficit present. Mental Status: She is alert and oriented to person, place, and time. Cranial Nerves: No cranial nerve deficit. Sensory: No sensory deficit. Psychiatric:         Mood and Affect: Mood normal.         Behavior: Behavior normal.         SCREENINGS              LAB, EKG AND DIAGNOSTIC RESULTS   Labs:  No results found for this or any previous visit (from the past 12 hour(s)). EKG: Initial EKG interpreted by me. Not Applicable    Radiologic Studies:  Non-plain film images such as CT, Ultrasound and MRI are read by the radiologist. Plain radiographic images are visualized and preliminarily interpreted by the ED Physician with the following findings: Bryce Trinidad shows no acute cardiopulmonary process    Interpretation per the Radiologist below, if available at the time of this note:  No results found. PROCEDURES   Unless otherwise noted below, none. Procedures      CRITICAL CARE TIME       CRITICAL CARE NOTE :  7:56 PM  Amount of Critical Care Time: 48(minutes)    IMPENDING DETERIORATION -Respiratory  ASSOCIATED RISK FACTORS - Hypoxia  MANAGEMENT- Bedside Assessment and Supervision of Care  INTERPRETATION -patient's presentation  INTERVENTIONS - Metobolic interventions  CASE REVIEW - Hospitalist/Intensivist, Nursing, and Family  TREATMENT RESPONSE -Improved and Stable  PERFORMED BY - Self    NOTES   :  I have spent critical care time involved in lab review, consultations with specialist, family decision- making, bedside attention and documentation. This time excludes time spent in any separate billed procedures. During this entire length of time I was immediately available to the patient .     Leta Bazzi MD     ED COURSE and DIFFERENTIAL DIAGNOSIS/MDM   CC/HPI/PE Summary, DDx: 61-year-old female presents with nonproductive dry cough for 3 weeks, patient finished a course of antibiotics and steroid had a couple days of relief and the coughing resumed    DDx pneumonia, CHF, COPD    Records Reviewed (source and summary of external notes): Prior medical records and Nursing notes    Vitals:    Vitals:    04/23/23 1636   BP: (!) 174/91   Pulse: 81   Resp: 19   SpO2: 95%   Weight: 140.2 kg (309 lb)   Height: 5' 8\" (1.727 m)        ED COURSE  ED Course as of 04/23/23 1954   Sun Apr 23, 2023 1926 Dr. José Man called for hospitalist admission and he stated that he was called directly and he stated he is not on-call, Dr. Nathalie Espinoza is covering, we will attempt to reach Dr. Nathalie Espinoza through [facility] [SB]      ED Course User Index  [SB] Simón Rush MD       Disposition Considerations (Tests not done, Shared Decision Making, Pt Expectation of Test or Treatment.):  Patient admitted for continuous albuterol nebulization    Patient was given the following medications:  Medications - No data to display    CONSULTS: (Who and What was discussed)  None     Social Determinants affecting Dx or Tx: None    Smoking Cessation: Not Applicable    FINAL IMPRESSION   No diagnosis found. DISPOSITION/PLAN       Admit Note: Pt is being admitted by Dr. Nba Carey. The results of their tests and reason(s) for their admission have been discussed with pt and/or available family. They convey agreement and understanding for the need to be admitted and for the admission diagnosis. PATIENT REFERRED TO:  Follow-up Information    None           DISCHARGE MEDICATIONS:  Current Discharge Medication List            DISCONTINUED MEDICATIONS:  Current Discharge Medication List          I am the Primary Clinician of Record: Gayatri Hernandez MD (electronically signed)    (Please note that parts of this dictation were completed with voice recognition software.  Quite often unanticipated grammatical, syntax, homophones, and other interpretive errors are inadvertently transcribed by the computer software. Please disregards these errors.  Please excuse any errors that have escaped final proofreading.)

## 2023-04-23 NOTE — ED TRIAGE NOTES
Pt reports productive painful cough x 3 weeks. Pt has seen PCP for c/o same and was given steroids without improvement.

## 2023-04-23 NOTE — ED PROVIDER NOTES
[facility] EMERGENCY DEPT  EMERGENCY DEPARTMENT HISTORY AND PHYSICAL EXAM      Date: 2023  Patient Name: Valentine Sorensen  MRN: [id number]  YOB: 1965  Date of evaluation: 2023  Provider: Yeni Medina MD   Note Started: 4:57 PM 23    HISTORY OF PRESENT ILLNESS     Chief Complaint   Patient presents with    Cough       History Provided By: Patient    HPI: Valentine Sorensen is a 57 y.o. female     PAST MEDICAL HISTORY   Past Medical History:  Past Medical History:   Diagnosis Date    Arthritis     Chronic pain     bilateral hips and right knee    Morbid obesity with BMI of 45.0-49.9, adult (HCC) 2016    Motion sickness     when not eating    MRSA carrier 2019    Seizures (formerly [facility])     since age 1 last seizure ; on dilantin (drug level checked annually by PCP)    Stroke (formerly [facility])     Thrombocytosis 2019       Past Surgical History:  Past Surgical History:   Procedure Laterality Date    HX BREAST BIOPSY Left     NEG    HX  SECTION      x 2    HX HEENT      LASIK    HX KNEE ARTHROSCOPY Bilateral     HX KNEE REPLACEMENT Right 2018    HX ORTHOPAEDIC Left     TKR    HX ORTHOPAEDIC Left     LTH    HX ORTHOPAEDIC Right     RTH    HX OTHER SURGICAL      BIOPSY LEFT LOWER LEG(AGE 23)    HX TONSILLECTOMY      HX TUBAL LIGATION         Family History:  Family History   Problem Relation Age of Onset    Cancer Mother 50        breast cancer    Diabetes Mother     Anesth Problems Mother         PONV    Heart Disease Father     Hypertension Father     No Known Problems Brother        Social History:  Social History     Tobacco Use    Smoking status: Never    Smokeless tobacco: Never   Substance Use Topics    Alcohol use: Yes     Comment: <1/wk    Drug use: No       Allergies:  No Known Allergies    PCP: Zamzam Valdivia PA-C    Current Meds:   Previous Medications    ASPIRIN (ASPIRIN) 325 MG TABLET    Take 1 Tab by mouth daily.    HYDROXYUREA (HYDREA) 500 MG CAPSULE    THREE CAPSULES  BY MOUTH EVERY OTHER DAY AT BEDTIME - LEUKEMIA    LORAZEPAM (ATIVAN) 0.5 MG TABLET    Take  by mouth.    PHENYTOIN ER (DILANTIN EXTENDED) 100 MG ER CAPSULE    Take 300 mg by mouth Daily (before breakfast). Take with Dilantin 30 mg capsule for total daily dose of 330 mg every morning    PHENYTOIN ER (DILANTIN) 30 MG ER CAPSULE    Take 30 mg by mouth Daily (before breakfast). Take with Dilantin 300 mg capsule for total daily dose of 330 mg every morning     ROSUVASTATIN (CRESTOR) 10 MG TABLET    Take 10 mg by mouth nightly.       PHYSICAL EXAM     ED Triage Vitals [04/23/23 1636]   ED Encounter Vitals Group      BP (!) 174/91      Pulse (Heart Rate) 81      Resp Rate 19      Temp       Temp src       O2 Sat (%) 95 %      Weight 309 lb      Height 5' 8\"      Physical Exam  Vitals and nursing note reviewed.   Constitutional:       General: She is in acute distress.      Appearance: She is not ill-appearing, toxic-appearing or diaphoretic.   HENT:      Head: Normocephalic and atraumatic.      Right Ear: Tympanic membrane normal.      Left Ear: Tympanic membrane normal.      Nose: Nose normal. No congestion.      Mouth/Throat:      Mouth: Mucous membranes are moist.      Pharynx: Oropharynx is clear.   Eyes:      Extraocular Movements: Extraocular movements intact.      Conjunctiva/sclera: Conjunctivae normal.      Pupils: Pupils are equal, round, and reactive to light.   Cardiovascular:      Rate and Rhythm: Normal rate and regular rhythm.      Pulses: Normal pulses.      Heart sounds: Normal heart sounds.   Pulmonary:      Effort: Pulmonary effort is normal.      Breath sounds: Normal breath sounds.   Abdominal:      General: Bowel sounds are normal.      Palpations: Abdomen is soft.      Tenderness: There is no abdominal tenderness.   Musculoskeletal:         General: No tenderness, deformity or signs of injury. Normal range of motion.      Cervical back: Normal range of motion and neck supple. No rigidity or  tenderness.   Lymphadenopathy:      Cervical: No cervical adenopathy.   Skin:     General: Skin is warm and dry.      Capillary Refill: Capillary refill takes less than 2 seconds.      Findings: No rash.   Neurological:      General: No focal deficit present.      Mental Status: She is alert and oriented to person, place, and time.      Cranial Nerves: No cranial nerve deficit.      Sensory: No sensory deficit.   Psychiatric:         Mood and Affect: Mood normal.         Behavior: Behavior normal.         SCREENINGS              LAB, EKG AND DIAGNOSTIC RESULTS   Labs:  No results found for this or any previous visit (from the past 12 hour(s)).    EKG: Initial EKG interpreted by me. Not Applicable    Radiologic Studies:  Non-plain film images such as CT, Ultrasound and MRI are read by the radiologist. Plain radiographic images are visualized and preliminarily interpreted by the ED Physician with the following findings: XRAY shows no acute cardiopulmonary process    Interpretation per the Radiologist below, if available at the time of this note:  No results found.     PROCEDURES   Unless otherwise noted below, none.  Procedures      CRITICAL CARE TIME       CRITICAL CARE NOTE :  7:56 PM  Amount of Critical Care Time: 48(minutes)    IMPENDING DETERIORATION -Respiratory  ASSOCIATED RISK FACTORS - Hypoxia  MANAGEMENT- Bedside Assessment and Supervision of Care  INTERPRETATION -patient's presentation  INTERVENTIONS - Metobolic interventions  CASE REVIEW - Hospitalist/Intensivist, Nursing, and Family  TREATMENT RESPONSE -Improved and Stable  PERFORMED BY - Self    NOTES   :  I have spent critical care time involved in lab review, consultations with specialist, family decision- making, bedside attention and documentation. This time excludes time spent in any separate billed procedures.  During this entire length of time I was immediately available to the patient .    Yeni Medina MD     ED COURSE and DIFFERENTIAL  DIAGNOSIS/MDM   CC/HPI/PE Summary, DDx: 57-year-old female presents with nonproductive dry cough for 3 weeks, patient finished a course of antibiotics and steroid had a couple days of relief and the coughing resumed    DDx pneumonia, CHF, COPD    Records Reviewed (source and summary of external notes): Prior medical records and Nursing notes    Vitals:    Vitals:    04/23/23 1636   BP: (!) 174/91   Pulse: 81   Resp: 19   SpO2: 95%   Weight: 140.2 kg (309 lb)   Height: 5' 8\" (1.727 m)        ED COURSE  ED Course as of 04/23/23 1954   Sun Apr 23, 2023 1926 Dr. Mason called for hospitalist admission and he stated that he was called directly and he stated he is not on-call, Dr. Nuno is covering, we will attempt to reach Dr. Nuno through Video Passports [SB]      ED Course User Index  [SB] Yeni Medina MD       Disposition Considerations (Tests not done, Shared Decision Making, Pt Expectation of Test or Treatment.):  Patient admitted for continuous albuterol nebulization    Patient was given the following medications:  Medications - No data to display    CONSULTS: (Who and What was discussed)  None     Social Determinants affecting Dx or Tx: None    Smoking Cessation: Not Applicable    FINAL IMPRESSION   No diagnosis found.      DISPOSITION/PLAN       Admit Note: Pt is being admitted by Dr. Gamboa. The results of their tests and reason(s) for their admission have been discussed with pt and/or available family. They convey agreement and understanding for the need to be admitted and for the admission diagnosis.     PATIENT REFERRED TO:  Follow-up Information    None           DISCHARGE MEDICATIONS:  Current Discharge Medication List            DISCONTINUED MEDICATIONS:  Current Discharge Medication List          I am the Primary Clinician of Record: Yeni Medina MD (electronically signed)    (Please note that parts of this dictation were completed with voice recognition software. Quite often  unanticipated grammatical, syntax, homophones, and other interpretive errors are inadvertently transcribed by the computer software. Please disregards these errors. Please excuse any errors that have escaped final proofreading.)

## 2023-04-24 PROBLEM — J44.1 COPD WITH ACUTE EXACERBATION (HCC): Status: ACTIVE | Noted: 2023-04-24

## 2023-04-24 LAB
ALBUMIN SERPL-MCNC: 3.7 G/DL (ref 3.5–5)
ALBUMIN/GLOB SERPL: 1.2 (ref 1.1–2.2)
ALP SERPL-CCNC: 79 U/L (ref 45–117)
ALT SERPL-CCNC: 24 U/L (ref 12–78)
ANION GAP SERPL CALC-SCNC: 6 MMOL/L (ref 5–15)
AST SERPL W P-5'-P-CCNC: 19 U/L (ref 15–37)
BILIRUB SERPL-MCNC: 0.4 MG/DL (ref 0.2–1)
BUN SERPL-MCNC: 11 MG/DL (ref 6–20)
BUN/CREAT SERPL: 16 (ref 12–20)
CA-I BLD-MCNC: 8.2 MG/DL (ref 8.5–10.1)
CHLORIDE SERPL-SCNC: 105 MMOL/L (ref 97–108)
CO2 SERPL-SCNC: 30 MMOL/L (ref 21–32)
CREAT SERPL-MCNC: 0.69 MG/DL (ref 0.55–1.02)
ERYTHROCYTE [DISTWIDTH] IN BLOOD BY AUTOMATED COUNT: 15.7 % (ref 11.5–14.5)
EST. AVERAGE GLUCOSE BLD GHB EST-MCNC: 108 MG/DL
GLOBULIN SER CALC-MCNC: 3 G/DL (ref 2–4)
GLUCOSE SERPL-MCNC: 110 MG/DL (ref 65–100)
HBA1C MFR BLD: 5.4 % (ref 4–5.6)
HCT VFR BLD AUTO: 33.2 % (ref 35–47)
HGB BLD-MCNC: 10.9 G/DL (ref 11.5–16)
MCH RBC QN AUTO: 36.5 PG (ref 26–34)
MCHC RBC AUTO-ENTMCNC: 32.8 G/DL (ref 30–36.5)
MCV RBC AUTO: 111 FL (ref 80–99)
NRBC # BLD: 0 K/UL (ref 0–0.01)
NRBC BLD-RTO: 0 PER 100 WBC
PLATELET # BLD AUTO: 332 K/UL (ref 150–400)
PMV BLD AUTO: 11.7 FL (ref 8.9–12.9)
POTASSIUM SERPL-SCNC: 3.7 MMOL/L (ref 3.5–5.1)
PROT SERPL-MCNC: 6.7 G/DL (ref 6.4–8.2)
RBC # BLD AUTO: 2.99 M/UL (ref 3.8–5.2)
SODIUM SERPL-SCNC: 141 MMOL/L (ref 136–145)
WBC # BLD AUTO: 2.4 K/UL (ref 3.6–11)

## 2023-04-24 PROCEDURE — 74011250637 HC RX REV CODE- 250/637: Performed by: INTERNAL MEDICINE

## 2023-04-24 PROCEDURE — 65270000029 HC RM PRIVATE

## 2023-04-24 PROCEDURE — 74011636637 HC RX REV CODE- 636/637: Performed by: HOSPITALIST

## 2023-04-24 PROCEDURE — 74011250636 HC RX REV CODE- 250/636: Performed by: HOSPITALIST

## 2023-04-24 PROCEDURE — A9270 NON-COVERED ITEM OR SERVICE: HCPCS | Performed by: HOSPITALIST

## 2023-04-24 PROCEDURE — 74011000250 HC RX REV CODE- 250: Performed by: HOSPITALIST

## 2023-04-24 PROCEDURE — 74011250637 HC RX REV CODE- 250/637: Performed by: HOSPITALIST

## 2023-04-24 PROCEDURE — 74011000250 HC RX REV CODE- 250: Performed by: INTERNAL MEDICINE

## 2023-04-24 PROCEDURE — 94640 AIRWAY INHALATION TREATMENT: CPT

## 2023-04-24 PROCEDURE — 36415 COLL VENOUS BLD VENIPUNCTURE: CPT

## 2023-04-24 PROCEDURE — G0378 HOSPITAL OBSERVATION PER HR: HCPCS

## 2023-04-24 PROCEDURE — 74011250636 HC RX REV CODE- 250/636: Performed by: INTERNAL MEDICINE

## 2023-04-24 PROCEDURE — 83036 HEMOGLOBIN GLYCOSYLATED A1C: CPT

## 2023-04-24 PROCEDURE — 85027 COMPLETE CBC AUTOMATED: CPT

## 2023-04-24 PROCEDURE — 80053 COMPREHEN METABOLIC PANEL: CPT

## 2023-04-24 RX ORDER — ROSUVASTATIN CALCIUM 10 MG/1
10 TABLET, COATED ORAL
Status: DISCONTINUED | OUTPATIENT
Start: 2023-04-24 | End: 2023-04-26 | Stop reason: HOSPADM

## 2023-04-24 RX ORDER — DOXYCYCLINE 100 MG/1
100 CAPSULE ORAL EVERY 12 HOURS
Status: DISCONTINUED | OUTPATIENT
Start: 2023-04-24 | End: 2023-04-26 | Stop reason: HOSPADM

## 2023-04-24 RX ORDER — ACETAMINOPHEN 650 MG/1
650 SUPPOSITORY RECTAL
Status: DISCONTINUED | OUTPATIENT
Start: 2023-04-24 | End: 2023-04-24

## 2023-04-24 RX ORDER — PHENYTOIN SODIUM 100 MG/1
300 CAPSULE, EXTENDED RELEASE ORAL
Status: DISCONTINUED | OUTPATIENT
Start: 2023-04-24 | End: 2023-04-25

## 2023-04-24 RX ORDER — ACETAMINOPHEN 325 MG/1
650 TABLET ORAL
Status: DISCONTINUED | OUTPATIENT
Start: 2023-04-24 | End: 2023-04-24

## 2023-04-24 RX ORDER — HYDROXYUREA 500 MG/1
1500 CAPSULE ORAL EVERY OTHER DAY
Status: DISCONTINUED | OUTPATIENT
Start: 2023-04-26 | End: 2023-04-26 | Stop reason: HOSPADM

## 2023-04-24 RX ORDER — BENZONATATE 100 MG/1
100 CAPSULE ORAL 3 TIMES DAILY
Status: DISCONTINUED | OUTPATIENT
Start: 2023-04-24 | End: 2023-04-26 | Stop reason: HOSPADM

## 2023-04-24 RX ORDER — HYDROXYUREA 500 MG/1
1500 CAPSULE ORAL EVERY OTHER DAY
Status: DISCONTINUED | OUTPATIENT
Start: 2023-04-24 | End: 2023-04-24

## 2023-04-24 RX ORDER — IPRATROPIUM BROMIDE AND ALBUTEROL SULFATE 2.5; .5 MG/3ML; MG/3ML
3 SOLUTION RESPIRATORY (INHALATION)
Status: DISCONTINUED | OUTPATIENT
Start: 2023-04-24 | End: 2023-04-26 | Stop reason: HOSPADM

## 2023-04-24 RX ORDER — ALBUTEROL SULFATE 90 UG/1
1 AEROSOL, METERED RESPIRATORY (INHALATION)
Status: DISCONTINUED | OUTPATIENT
Start: 2023-04-24 | End: 2023-04-24

## 2023-04-24 RX ORDER — SODIUM CHLORIDE 0.9 % (FLUSH) 0.9 %
5-40 SYRINGE (ML) INJECTION EVERY 8 HOURS
Status: DISCONTINUED | OUTPATIENT
Start: 2023-04-24 | End: 2023-04-26 | Stop reason: HOSPADM

## 2023-04-24 RX ORDER — POLYETHYLENE GLYCOL 3350 17 G/17G
17 POWDER, FOR SOLUTION ORAL DAILY PRN
Status: DISCONTINUED | OUTPATIENT
Start: 2023-04-24 | End: 2023-04-26 | Stop reason: HOSPADM

## 2023-04-24 RX ORDER — CODEINE PHOSPHATE AND GUAIFENESIN 10; 100 MG/5ML; MG/5ML
5 SOLUTION ORAL
Status: DISCONTINUED | OUTPATIENT
Start: 2023-04-24 | End: 2023-04-26 | Stop reason: HOSPADM

## 2023-04-24 RX ORDER — PREDNISONE 20 MG/1
40 TABLET ORAL
Status: DISCONTINUED | OUTPATIENT
Start: 2023-04-24 | End: 2023-04-24

## 2023-04-24 RX ORDER — SODIUM CHLORIDE 0.9 % (FLUSH) 0.9 %
5-40 SYRINGE (ML) INJECTION AS NEEDED
Status: DISCONTINUED | OUTPATIENT
Start: 2023-04-24 | End: 2023-04-26 | Stop reason: HOSPADM

## 2023-04-24 RX ADMIN — IPRATROPIUM BROMIDE AND ALBUTEROL SULFATE 3 ML: .5; 3 SOLUTION RESPIRATORY (INHALATION) at 19:52

## 2023-04-24 RX ADMIN — BENZONATATE 100 MG: 100 CAPSULE ORAL at 00:59

## 2023-04-24 RX ADMIN — ROSUVASTATIN CALCIUM 10 MG: 10 TABLET, FILM COATED ORAL at 21:31

## 2023-04-24 RX ADMIN — DOXYCYCLINE 100 MG: 100 CAPSULE ORAL at 08:44

## 2023-04-24 RX ADMIN — DOXYCYCLINE 100 MG: 100 CAPSULE ORAL at 21:32

## 2023-04-24 RX ADMIN — SODIUM CHLORIDE, PRESERVATIVE FREE 10 ML: 5 INJECTION INTRAVENOUS at 14:03

## 2023-04-24 RX ADMIN — BENZONATATE 100 MG: 100 CAPSULE ORAL at 08:44

## 2023-04-24 RX ADMIN — PREDNISONE 40 MG: 20 TABLET ORAL at 07:46

## 2023-04-24 RX ADMIN — BENZONATATE 100 MG: 100 CAPSULE ORAL at 17:02

## 2023-04-24 RX ADMIN — GUAIFENESIN AND CODEINE PHOSPHATE 5 ML: 100; 10 SOLUTION ORAL at 23:45

## 2023-04-24 RX ADMIN — GUAIFENESIN AND CODEINE PHOSPHATE 5 ML: 100; 10 SOLUTION ORAL at 01:19

## 2023-04-24 RX ADMIN — IPRATROPIUM BROMIDE AND ALBUTEROL SULFATE 3 ML: .5; 3 SOLUTION RESPIRATORY (INHALATION) at 08:37

## 2023-04-24 RX ADMIN — SODIUM CHLORIDE, PRESERVATIVE FREE 10 ML: 5 INJECTION INTRAVENOUS at 21:33

## 2023-04-24 RX ADMIN — GUAIFENESIN AND CODEINE PHOSPHATE 5 ML: 100; 10 SOLUTION ORAL at 05:41

## 2023-04-24 RX ADMIN — BENZONATATE 100 MG: 100 CAPSULE ORAL at 21:18

## 2023-04-24 RX ADMIN — HYDROXYUREA 1500 MG: 500 CAPSULE ORAL at 00:59

## 2023-04-24 RX ADMIN — METHYLPREDNISOLONE SODIUM SUCCINATE 40 MG: 40 INJECTION, POWDER, FOR SOLUTION INTRAMUSCULAR; INTRAVENOUS at 11:37

## 2023-04-24 RX ADMIN — METHYLPREDNISOLONE SODIUM SUCCINATE 40 MG: 40 INJECTION, POWDER, FOR SOLUTION INTRAMUSCULAR; INTRAVENOUS at 23:49

## 2023-04-24 RX ADMIN — SODIUM CHLORIDE, PRESERVATIVE FREE 10 ML: 5 INJECTION INTRAVENOUS at 05:41

## 2023-04-24 RX ADMIN — SODIUM CHLORIDE, PRESERVATIVE FREE 10 ML: 5 INJECTION INTRAVENOUS at 01:00

## 2023-04-24 RX ADMIN — IPRATROPIUM BROMIDE AND ALBUTEROL SULFATE 3 ML: .5; 3 SOLUTION RESPIRATORY (INHALATION) at 14:13

## 2023-04-24 RX ADMIN — GUAIFENESIN AND CODEINE PHOSPHATE 5 ML: 100; 10 SOLUTION ORAL at 07:45

## 2023-04-24 RX ADMIN — ROSUVASTATIN CALCIUM 10 MG: 10 TABLET, FILM COATED ORAL at 00:59

## 2023-04-24 RX ADMIN — ALBUTEROL SULFATE 1 PUFF: 108 AEROSOL, METERED RESPIRATORY (INHALATION) at 03:31

## 2023-04-24 RX ADMIN — METHYLPREDNISOLONE SODIUM SUCCINATE 40 MG: 40 INJECTION, POWDER, FOR SOLUTION INTRAMUSCULAR; INTRAVENOUS at 17:02

## 2023-04-24 NOTE — PROGRESS NOTES
VTE Prophylaxis Dosing & Monitoring    Recent Labs     04/23/23  1710   HGB 12.0      INR 1.2*   CREA 0.80     Current Weight:   Wt Readings from Last 1 Encounters:   04/23/23 140.2 kg (309 lb)     Est. CrCl = > 100 ml/min  Current Dose: Enoxaparin 40 mg subcutaneously every 24 hours. Plan: Change to enoxaparin 30 mg SUBQ BID per [facility] P&T Committee Protocol with respect to subcutaneous anticoagulants. Pharmacy will continue to monitor patient daily and will make dosage adjustments based upon changing renal function and weight.

## 2023-04-24 NOTE — H&P
History & Physical    Primary Care Provider: Quintin Schuler PA-C  Source of Information: Patient self    History of Presenting Illness:   Yrn Jc is a 59 y.o. female who presents with reports of nonproductive cough and shortness of breath for the last 3 weeks. Symptoms appear to be worsening over the last few days therefore she decided to come for evaluation. Chest x-ray negative for obvious consolidation. She was noted with bilateral expiratory wheezing which did not improve with DuoNeb treatments. Admitted for further DuoNeb treatments and IV steroids. Denies use of tobacco products or exposure to secondhand smoke. Previously diagnosed with COVID-19 x2. Review of Systems:  A comprehensive review of systems was negative except for that written in the History of Present Illness. Past Medical History:   Diagnosis Date    History of stroke without residual deficits     Morbid obesity with BMI of 45.0-49.9, adult ([facility] [address] 2016    MRSA carrier 2019    Osteoarthritis of multiple joints     Primary localized osteoarthritis of right hip 2017    Seizure disorder ([facility] [address]     last seizure 2006    Thrombocytosis       Past Surgical History:   Procedure Laterality Date    HX BREAST BIOPSY Left     NEG    HX  SECTION      x 2    HX HEENT      LASIK    HX KNEE ARTHROSCOPY Bilateral     HX KNEE REPLACEMENT Right 2018    HX ORTHOPAEDIC Left     TKR    HX ORTHOPAEDIC Left     [address]    HX ORTHOPAEDIC Right     RTH    HX OTHER SURGICAL      BIOPSY LEFT LOWER LEG(AGE 23)    HX TONSILLECTOMY      HX TUBAL LIGATION       Prior to Admission medications    Medication Sig Start Date End Date Taking? Authorizing Provider   hydroxyurea (HYDREA) 500 mg capsule THREE CAPSULES BY MOUTH EVERY OTHER DAY AT BEDTIME - LEUKEMIA  Patient taking differently: Take 3 Capsules by mouth every other day.  23  Yes Cleo Mcguire MD   rosuvastatin (CRESTOR) 10 mg tablet Take 1 Tablet by mouth nightly. Yes Provider, Historical   LORazepam (ATIVAN) 0.5 mg tablet Take 1 Tablet by mouth daily as needed. Yes Provider, Historical   aspirin (ASPIRIN) 325 mg tablet Take 1 Tab by mouth daily. 3/15/20  Yes Renetta Rosa MD   phenytoin ER (DILANTIN ER) 100 mg ER capsule Take 3 Capsules by mouth Daily (before breakfast). Take with Dilantin 30 mg capsule for total daily dose of 330 mg every morning   Yes Provider, Historical   phenytoin ER (DILANTIN ER) 30 mg ER capsule Take 1 Capsule by mouth Daily (before breakfast). Take with Dilantin 300 mg capsule for total daily dose of 330 mg every morning   Yes Provider, Historical     No Known Allergies   Family History   Problem Relation Age of Onset    Cancer Mother 48        breast cancer    Diabetes Mother     Anesth Problems Mother         PONV    Heart Disease Father     Hypertension Father     No Known Problems Brother         SOCIAL HISTORY:  Patient resides:  Independently    Assisted Living    SNF    With family care x      Smoking history:   None x   Former    Chronic      Alcohol history:   None x   Social    Chronic      Ambulates:   Independently x   w/cane    w/walker    w/wc    CODE STATUS:  DNR    Full x   Other      Objective:     Physical Exam:     Visit Vitals  BP (!) 141/74 (BP 1 Location: Left upper arm, BP Patient Position: Sitting)   Pulse 67   Temp 98.2 °F (36.8 °C)   Resp 20   Ht 5' 7.99\" (1.727 m)   Wt 145.7 kg (321 lb 1.6 oz)   SpO2 93%   BMI 48.83 kg/m²      O2 Device: None (Room air)    General:  Alert, cooperative, no distress, appears stated age. Head:  Normocephalic, without obvious abnormality, atraumatic. Eyes:  Conjunctivae/corneas clear. PERRL, EOMs intact. Nose: Nares normal. Septum midline. Mucosa normal. No drainage or sinus tenderness.    Throat: Lips, mucosa, and tongue normal. Teeth and gums normal.   Neck: Supple, symmetrical, trachea midline, no adenopathy, thyroid: no enlargement/tenderness/nodules, no carotid bruit and no JVD. Back:   Symmetric, no curvature. ROM normal. No CVA tenderness. Lungs:   Bilateral expiratory wheezing   Chest wall:  No tenderness or deformity. Heart:  Regular rate and rhythm, S1, S2 normal, no murmur, click, rub or gallop. Abdomen:   Soft, non-tender. Bowel sounds normal. No masses,  No organomegaly. Extremities: Extremities normal, atraumatic, no cyanosis or edema. Pulses: 2+ and symmetric all extremities. Skin: Skin color, texture, turgor normal. No rashes or lesions   Neurologic: CNII-XII intact. No motor or sensory deficits. EKG:  nonspecific ST and T waves changes. Data Review:     Recent Days:  Recent Labs     04/24/23  0520 04/23/23  1710   WBC 2.4* 2.3*   HGB 10.9* 12.0   HCT 33.2* 35.9    351     Recent Labs     04/24/23  0520 04/23/23  1710    140   K 3.7 3.6    104   CO2 30 28   * 109*   BUN 11 14   CREA 0.69 0.80   CA 8.2* 8.6   ALB 3.7 3.8   TBILI 0.4 0.3   ALT 24 27   INR  --  1.2*     No results for input(s): PH, PCO2, PO2, HCO3, FIO2 in the last 72 hours. 24 Hour Results:  Recent Results (from the past 24 hour(s))   CBC WITH AUTOMATED DIFF    Collection Time: 04/23/23  5:10 PM   Result Value Ref Range    WBC 2.3 (L) 3.6 - 11.0 K/uL    RBC 3.25 (L) 3.80 - 5.20 M/uL    HGB 12.0 11.5 - 16.0 g/dL    HCT 35.9 35.0 - 47.0 %    .5 (H) 80.0 - 99.0 FL    MCH 36.9 (H) 26.0 - 34.0 PG    MCHC 33.4 30.0 - 36.5 g/dL    RDW 15.7 (H) 11.5 - 14.5 %    PLATELET 684 291 - 415 K/uL    MPV 11.6 8.9 - 12.9 FL    NRBC 0.0 0.0  WBC    ABSOLUTE NRBC 0.00 0.00 - 0.01 K/uL    NEUTROPHILS 58 32 - 75 %    LYMPHOCYTES 28 12 - 49 %    MONOCYTES 12 5 - 13 %    EOSINOPHILS 2 0 - 7 %    BASOPHILS 0 0 - 1 %    IMMATURE GRANULOCYTES 0 0 - 0.5 %    ABS. NEUTROPHILS 1.3 (L) 1.8 - 8.0 K/UL    ABS. LYMPHOCYTES 0.7 (L) 0.8 - 3.5 K/UL    ABS. MONOCYTES 0.3 0.0 - 1.0 K/UL    ABS.  EOSINOPHILS 0.1 0.0 - 0.4 K/UL    ABS. BASOPHILS 0.0 0.0 - 0.1 K/UL    ABS. IMM. GRANS. 0.0 0.00 - 0.04 K/UL    DF AUTOMATED     PROTHROMBIN TIME + INR    Collection Time: 04/23/23  5:10 PM   Result Value Ref Range    Prothrombin time 14.8 (H) 11.9 - 14.6 sec    INR 1.2 (H) 0.9 - 1.1     D DIMER    Collection Time: 04/23/23  5:10 PM   Result Value Ref Range    D DIMER <0.27 <0.50 ug/ml(FEU)   METABOLIC PANEL, COMPREHENSIVE    Collection Time: 04/23/23  5:10 PM   Result Value Ref Range    Sodium 140 136 - 145 mmol/L    Potassium 3.6 3.5 - 5.1 mmol/L    Chloride 104 97 - 108 mmol/L    CO2 28 21 - 32 mmol/L    Anion gap 8 5 - 15 mmol/L    Glucose 109 (H) 65 - 100 mg/dL    BUN 14 6 - 20 mg/dL    Creatinine 0.80 0.55 - 1.02 mg/dL    BUN/Creatinine ratio 18 12 - 20      eGFR >60 >60 ml/min/1.73m2    Calcium 8.6 8.5 - 10.1 mg/dL    Bilirubin, total 0.3 0.2 - 1.0 mg/dL    AST (SGOT) 20 15 - 37 U/L    ALT (SGPT) 27 12 - 78 U/L    Alk.  phosphatase 86 45 - 117 U/L    Protein, total 7.0 6.4 - 8.2 g/dL    Albumin 3.8 3.5 - 5.0 g/dL    Globulin 3.2 2.0 - 4.0 g/dL    A-G Ratio 1.2 1.1 - 2.2     CBC W/O DIFF    Collection Time: 04/24/23  5:20 AM   Result Value Ref Range    WBC 2.4 (L) 3.6 - 11.0 K/uL    RBC 2.99 (L) 3.80 - 5.20 M/uL    HGB 10.9 (L) 11.5 - 16.0 g/dL    HCT 33.2 (L) 35.0 - 47.0 %    .0 (H) 80.0 - 99.0 FL    MCH 36.5 (H) 26.0 - 34.0 PG    MCHC 32.8 30.0 - 36.5 g/dL    RDW 15.7 (H) 11.5 - 14.5 %    PLATELET 122 615 - 474 K/uL    MPV 11.7 8.9 - 12.9 FL    NRBC 0.0 0.0  WBC    ABSOLUTE NRBC 0.00 0.00 - 9.79 K/uL   METABOLIC PANEL, COMPREHENSIVE    Collection Time: 04/24/23  5:20 AM   Result Value Ref Range    Sodium 141 136 - 145 mmol/L    Potassium 3.7 3.5 - 5.1 mmol/L    Chloride 105 97 - 108 mmol/L    CO2 30 21 - 32 mmol/L    Anion gap 6 5 - 15 mmol/L    Glucose 110 (H) 65 - 100 mg/dL    BUN 11 6 - 20 mg/dL    Creatinine 0.69 0.55 - 1.02 mg/dL    BUN/Creatinine ratio 16 12 - 20      eGFR >60 >60 ml/min/1.73m2    Calcium 8.2 (L) 8.5 - 10.1 mg/dL    Bilirubin, total 0.4 0.2 - 1.0 mg/dL    AST (SGOT) 19 15 - 37 U/L    ALT (SGPT) 24 12 - 78 U/L    Alk. phosphatase 79 45 - 117 U/L    Protein, total 6.7 6.4 - 8.2 g/dL    Albumin 3.7 3.5 - 5.0 g/dL    Globulin 3.0 2.0 - 4.0 g/dL    A-G Ratio 1.2 1.1 - 2.2           Imaging:   XR CHEST PORT   Final Result   No acute cardiopulmonary disease. CT NECK SOFT TISSUE WO CONT    (Results Pending)         Assessment:     Acute COPD exacerbation    -IV Solu-Medrol 40 mg every 6 hours    -DuoNeb treatments Q6    -Doxycycline 100 mg oral twice daily    -Robitussin 5 mL every 4 hours as needed for cough    History of seizure disorder    -Resume home Dilantin    -Seizure precautions    Marked morbid obesity    -BMI of greater than 48    -Counseled on diet and exercising    -Obtain A1c level    Cardiac dysrhythmia    -Noted with brief run of V. Tach    -We will continue to monitor daily electrolytes including magnesium and potassium    -Replete as needed    Probably obstructive sleep apnea    -Patient advised to get sleep study done outpatient      Plan:     Admit to medical telemetry floor inpatient  Treatment plan as outlined above  Care plan discussed with patient  CODE STATUS discussed.   She is full code  Anticipate greater than 48 hours of in-hospital stay for treatment of acute COPD exacerbation with IV steroids and DuoNeb treatments    Signed By: Theresia Paget, MD     April 24, 2023

## 2023-04-24 NOTE — ROUTINE PROCESS
Report was received from the offgoing nurse Melissa Ferraro. Care was resumed via the incoming nurse [facility] JOSEF PARSONS. The report consist of using kardex information.

## 2023-04-24 NOTE — ROUTINE PROCESS
Report was received from the offgoing nurse Davin Matute. Care was resumed via the incoming nurse [facility] JOSEF PARSONS. The report consist of using kardex information.

## 2023-04-24 NOTE — ROUTINE PROCESS
Resting quietly at this time, coughing has been reduced with use of Proventil and cough medication.   Did have a run of V-tach approximately 5 beats with a coughing episode this am.

## 2023-04-24 NOTE — ROUTINE PROCESS
MD returned text indicating cough is secondary to upper airway issue and ordered CT Scan of soft tissue of the neck.

## 2023-04-24 NOTE — PROGRESS NOTES
Problem: Falls - Risk of  Goal: *Absence of Falls  Description: Document Chetan Vidales Fall Risk and appropriate interventions in the flowsheet. Outcome: Progressing Towards Goal  Note: Fall Risk Interventions:     Problem: Patient Education:  Go to Education Activity  Goal: Patient/Family Education  Outcome: Progressing Towards Goal     Problem: General Medical Care Plan  Goal: *Vital signs within specified parameters  Outcome: Progressing Towards Goal  Goal: *Labs within defined limits  Outcome: Progressing Towards Goal  Goal: *Absence of infection signs and symptoms  Outcome: Progressing Towards Goal  Goal: *Optimal pain control at patient's stated goal  Outcome: Progressing Towards Goal  Goal: *Skin integrity maintained  Outcome: Progressing Towards Goal  Goal: *Fluid volume balance  Outcome: Progressing Towards Goal  Goal: *Optimize nutritional status  Outcome: Progressing Towards Goal  Goal: *Anxiety reduced or absent  Outcome: Progressing Towards Goal  Goal: *Progressive mobility and function (eg: ADL's)  Outcome: Progressing Towards Goal        Problem: Risk for Spread of Infection  Goal: Prevent transmission of infectious organism to others  Description: Prevent the transmission of infectious organisms to other patients, staff members, and visitors.   Outcome: Progressing Towards Goal

## 2023-04-24 NOTE — ROUTINE PROCESS
Reached out to Dr. Jose Alberto Aleman regarding patient continued coughing and need for cough med, awaiting return text.

## 2023-04-24 NOTE — ROUTINE PROCESS
New admission arrived via W/C in stable condition. Requested to remain in her clothes and not wear gown. Admission completed, patient with deep hacking cough all through the admission. Deep breathing causes coughing.

## 2023-04-24 NOTE — PROGRESS NOTES
VTE Prophylaxis Dosing & Monitoring    Recent Labs     04/23/23  1710   HGB 12.0      INR 1.2*   CREA 0.80     Current Weight:   Wt Readings from Last 1 Encounters:   04/23/23 140.2 kg (309 lb)     Est. CrCl = > 100 ml/min  Current Dose: Enoxaparin 40 mg subcutaneously every 24 hours. Plan: Change to enoxaparin 30 mg SUBQ BID per [facility] P&T Committee Protocol with respect to subcutaneous anticoagulants. Pharmacy will continue to monitor patient daily and will make dosage adjustments based upon changing renal function and weight.

## 2023-04-24 NOTE — ROUTINE PROCESS
Rounds with [facility]. Patient in bed awake, coughing, HOB elevated, only complains of constant coughing. All concerns addressed and call light within reach.

## 2023-04-24 NOTE — H&P
History & Physical    Primary Care Provider: Sami Aguilar PA-C  Source of Information: Patient self    History of Presenting Illness:   Branden Blackwood is a 59 y.o. female who presents with reports of nonproductive cough and shortness of breath for the last 3 weeks. Symptoms appear to be worsening over the last few days therefore she decided to come for evaluation. Chest x-ray negative for obvious consolidation. She was noted with bilateral expiratory wheezing which did not improve with DuoNeb treatments. Admitted for further DuoNeb treatments and IV steroids. Denies use of tobacco products or exposure to secondhand smoke. Previously diagnosed with COVID-19 x2. Review of Systems:  A comprehensive review of systems was negative except for that written in the History of Present Illness. Past Medical History:   Diagnosis Date    History of stroke without residual deficits     Morbid obesity with BMI of 45.0-49.9, adult ([facility] [address] 2016    MRSA carrier 2019    Osteoarthritis of multiple joints     Primary localized osteoarthritis of right hip 2017    Seizure disorder ([facility] [address]     last seizure 2006    Thrombocytosis       Past Surgical History:   Procedure Laterality Date    HX BREAST BIOPSY Left     NEG    HX  SECTION      x 2    HX HEENT      LASIK    HX KNEE ARTHROSCOPY Bilateral     HX KNEE REPLACEMENT Right 2018    HX ORTHOPAEDIC Left     TKR    HX ORTHOPAEDIC Left     [address]    HX ORTHOPAEDIC Right     RTH    HX OTHER SURGICAL      BIOPSY LEFT LOWER LEG(AGE 23)    HX TONSILLECTOMY      HX TUBAL LIGATION       Prior to Admission medications    Medication Sig Start Date End Date Taking? Authorizing Provider   hydroxyurea (HYDREA) 500 mg capsule THREE CAPSULES BY MOUTH EVERY OTHER DAY AT BEDTIME - LEUKEMIA  Patient taking differently: Take 3 Capsules by mouth every other day.  23  Yes Saba Rodírguez MD   rosuvastatin (CRESTOR) 10 mg tablet Take 1 Tablet by mouth nightly. Yes Provider, Historical   LORazepam (ATIVAN) 0.5 mg tablet Take 1 Tablet by mouth daily as needed. Yes Provider, Historical   aspirin (ASPIRIN) 325 mg tablet Take 1 Tab by mouth daily. 3/15/20  Yes Sarah Zaman MD   phenytoin ER (DILANTIN ER) 100 mg ER capsule Take 3 Capsules by mouth Daily (before breakfast). Take with Dilantin 30 mg capsule for total daily dose of 330 mg every morning   Yes Provider, Historical   phenytoin ER (DILANTIN ER) 30 mg ER capsule Take 1 Capsule by mouth Daily (before breakfast). Take with Dilantin 300 mg capsule for total daily dose of 330 mg every morning   Yes Provider, Historical     No Known Allergies   Family History   Problem Relation Age of Onset    Cancer Mother 48        breast cancer    Diabetes Mother     Anesth Problems Mother         PONV    Heart Disease Father     Hypertension Father     No Known Problems Brother         SOCIAL HISTORY:  Patient resides:  Independently    Assisted Living    SNF    With family care x      Smoking history:   None x   Former    Chronic      Alcohol history:   None x   Social    Chronic      Ambulates:   Independently x   w/cane    w/walker    w/wc    CODE STATUS:  DNR    Full x   Other      Objective:     Physical Exam:     Visit Vitals  BP (!) 141/74 (BP 1 Location: Left upper arm, BP Patient Position: Sitting)   Pulse 67   Temp 98.2 °F (36.8 °C)   Resp 20   Ht 5' 7.99\" (1.727 m)   Wt 145.7 kg (321 lb 1.6 oz)   SpO2 93%   BMI 48.83 kg/m²      O2 Device: None (Room air)    General:  Alert, cooperative, no distress, appears stated age. Head:  Normocephalic, without obvious abnormality, atraumatic. Eyes:  Conjunctivae/corneas clear. PERRL, EOMs intact. Nose: Nares normal. Septum midline. Mucosa normal. No drainage or sinus tenderness.    Throat: Lips, mucosa, and tongue normal. Teeth and gums normal.   Neck: Supple, symmetrical, trachea midline, no adenopathy, thyroid: no enlargement/tenderness/nodules, no carotid bruit and no JVD. Back:   Symmetric, no curvature. ROM normal. No CVA tenderness. Lungs:   Bilateral expiratory wheezing   Chest wall:  No tenderness or deformity. Heart:  Regular rate and rhythm, S1, S2 normal, no murmur, click, rub or gallop. Abdomen:   Soft, non-tender. Bowel sounds normal. No masses,  No organomegaly. Extremities: Extremities normal, atraumatic, no cyanosis or edema. Pulses: 2+ and symmetric all extremities. Skin: Skin color, texture, turgor normal. No rashes or lesions   Neurologic: CNII-XII intact. No motor or sensory deficits. EKG:  nonspecific ST and T waves changes. Data Review:     Recent Days:  Recent Labs     04/24/23  0520 04/23/23  1710   WBC 2.4* 2.3*   HGB 10.9* 12.0   HCT 33.2* 35.9    351     Recent Labs     04/24/23  0520 04/23/23  1710    140   K 3.7 3.6    104   CO2 30 28   * 109*   BUN 11 14   CREA 0.69 0.80   CA 8.2* 8.6   ALB 3.7 3.8   TBILI 0.4 0.3   ALT 24 27   INR  --  1.2*     No results for input(s): PH, PCO2, PO2, HCO3, FIO2 in the last 72 hours. 24 Hour Results:  Recent Results (from the past 24 hour(s))   CBC WITH AUTOMATED DIFF    Collection Time: 04/23/23  5:10 PM   Result Value Ref Range    WBC 2.3 (L) 3.6 - 11.0 K/uL    RBC 3.25 (L) 3.80 - 5.20 M/uL    HGB 12.0 11.5 - 16.0 g/dL    HCT 35.9 35.0 - 47.0 %    .5 (H) 80.0 - 99.0 FL    MCH 36.9 (H) 26.0 - 34.0 PG    MCHC 33.4 30.0 - 36.5 g/dL    RDW 15.7 (H) 11.5 - 14.5 %    PLATELET 995 784 - 425 K/uL    MPV 11.6 8.9 - 12.9 FL    NRBC 0.0 0.0  WBC    ABSOLUTE NRBC 0.00 0.00 - 0.01 K/uL    NEUTROPHILS 58 32 - 75 %    LYMPHOCYTES 28 12 - 49 %    MONOCYTES 12 5 - 13 %    EOSINOPHILS 2 0 - 7 %    BASOPHILS 0 0 - 1 %    IMMATURE GRANULOCYTES 0 0 - 0.5 %    ABS. NEUTROPHILS 1.3 (L) 1.8 - 8.0 K/UL    ABS. LYMPHOCYTES 0.7 (L) 0.8 - 3.5 K/UL    ABS. MONOCYTES 0.3 0.0 - 1.0 K/UL    ABS.  EOSINOPHILS 0.1 0.0 - 0.4 K/UL    ABS. BASOPHILS 0.0 0.0 - 0.1 K/UL    ABS. IMM. GRANS. 0.0 0.00 - 0.04 K/UL    DF AUTOMATED     PROTHROMBIN TIME + INR    Collection Time: 04/23/23  5:10 PM   Result Value Ref Range    Prothrombin time 14.8 (H) 11.9 - 14.6 sec    INR 1.2 (H) 0.9 - 1.1     D DIMER    Collection Time: 04/23/23  5:10 PM   Result Value Ref Range    D DIMER <0.27 <0.50 ug/ml(FEU)   METABOLIC PANEL, COMPREHENSIVE    Collection Time: 04/23/23  5:10 PM   Result Value Ref Range    Sodium 140 136 - 145 mmol/L    Potassium 3.6 3.5 - 5.1 mmol/L    Chloride 104 97 - 108 mmol/L    CO2 28 21 - 32 mmol/L    Anion gap 8 5 - 15 mmol/L    Glucose 109 (H) 65 - 100 mg/dL    BUN 14 6 - 20 mg/dL    Creatinine 0.80 0.55 - 1.02 mg/dL    BUN/Creatinine ratio 18 12 - 20      eGFR >60 >60 ml/min/1.73m2    Calcium 8.6 8.5 - 10.1 mg/dL    Bilirubin, total 0.3 0.2 - 1.0 mg/dL    AST (SGOT) 20 15 - 37 U/L    ALT (SGPT) 27 12 - 78 U/L    Alk.  phosphatase 86 45 - 117 U/L    Protein, total 7.0 6.4 - 8.2 g/dL    Albumin 3.8 3.5 - 5.0 g/dL    Globulin 3.2 2.0 - 4.0 g/dL    A-G Ratio 1.2 1.1 - 2.2     CBC W/O DIFF    Collection Time: 04/24/23  5:20 AM   Result Value Ref Range    WBC 2.4 (L) 3.6 - 11.0 K/uL    RBC 2.99 (L) 3.80 - 5.20 M/uL    HGB 10.9 (L) 11.5 - 16.0 g/dL    HCT 33.2 (L) 35.0 - 47.0 %    .0 (H) 80.0 - 99.0 FL    MCH 36.5 (H) 26.0 - 34.0 PG    MCHC 32.8 30.0 - 36.5 g/dL    RDW 15.7 (H) 11.5 - 14.5 %    PLATELET 116 383 - 089 K/uL    MPV 11.7 8.9 - 12.9 FL    NRBC 0.0 0.0  WBC    ABSOLUTE NRBC 0.00 0.00 - 2.54 K/uL   METABOLIC PANEL, COMPREHENSIVE    Collection Time: 04/24/23  5:20 AM   Result Value Ref Range    Sodium 141 136 - 145 mmol/L    Potassium 3.7 3.5 - 5.1 mmol/L    Chloride 105 97 - 108 mmol/L    CO2 30 21 - 32 mmol/L    Anion gap 6 5 - 15 mmol/L    Glucose 110 (H) 65 - 100 mg/dL    BUN 11 6 - 20 mg/dL    Creatinine 0.69 0.55 - 1.02 mg/dL    BUN/Creatinine ratio 16 12 - 20      eGFR >60 >60 ml/min/1.73m2    Calcium 8.2 (L) 8.5 - 10.1 mg/dL    Bilirubin, total 0.4 0.2 - 1.0 mg/dL    AST (SGOT) 19 15 - 37 U/L    ALT (SGPT) 24 12 - 78 U/L    Alk. phosphatase 79 45 - 117 U/L    Protein, total 6.7 6.4 - 8.2 g/dL    Albumin 3.7 3.5 - 5.0 g/dL    Globulin 3.0 2.0 - 4.0 g/dL    A-G Ratio 1.2 1.1 - 2.2           Imaging:   XR CHEST PORT   Final Result   No acute cardiopulmonary disease. CT NECK SOFT TISSUE WO CONT    (Results Pending)         Assessment:     Acute COPD exacerbation    -IV Solu-Medrol 40 mg every 6 hours    -DuoNeb treatments Q6    -Doxycycline 100 mg oral twice daily    -Robitussin 5 mL every 4 hours as needed for cough    History of seizure disorder    -Resume home Dilantin    -Seizure precautions    Marked morbid obesity    -BMI of greater than 48    -Counseled on diet and exercising    -Obtain A1c level    Cardiac dysrhythmia    -Noted with brief run of V. Tach    -We will continue to monitor daily electrolytes including magnesium and potassium    -Replete as needed    Probably obstructive sleep apnea    -Patient advised to get sleep study done outpatient      Plan:     Admit to medical telemetry floor inpatient  Treatment plan as outlined above  Care plan discussed with patient  CODE STATUS discussed.   She is full code  Anticipate greater than 48 hours of in-hospital stay for treatment of acute COPD exacerbation with IV steroids and DuoNeb treatments    Signed By: Kenna Boxer, MD     April 24, 2023

## 2023-04-24 NOTE — PROGRESS NOTES
Care Management Interventions  PCP Verified by CM: Yes Palmira Cantu)  Last Visit to PCP: 04/17/23  Mode of Transport at Discharge: Other (see comment) ()  Transition of Care Consult (CM Consult): Discharge Planning  Support Systems: Spouse/Significant Other  Confirm Follow Up Transport: Self  The Plan for Transition of Care is Related to the Following Treatment Goals : Patient lives in her own home with her . She is independent with her own care. No discharge needs identified.   Discharge Location  Patient Expects to be Discharged to[de-identified] Home

## 2023-04-24 NOTE — PROGRESS NOTES
Care Management Interventions  PCP Verified by CM: Yes Geovanny Dacosta)  Last Visit to PCP: 04/17/23  Mode of Transport at Discharge: Other (see comment) ()  Transition of Care Consult (CM Consult): Discharge Planning  Support Systems: Spouse/Significant Other  Confirm Follow Up Transport: Self  The Plan for Transition of Care is Related to the Following Treatment Goals : Patient lives in her own home with her . She is independent with her own care. No discharge needs identified.   Discharge Location  Patient Expects to be Discharged to[de-identified] Home

## 2023-04-24 NOTE — PROGRESS NOTES
Problem: Falls - Risk of  Goal: *Absence of Falls  Description: Document Junior Gonsalez Fall Risk and appropriate interventions in the flowsheet. Outcome: Progressing Towards Goal  Note: Fall Risk Interventions:     Problem: Patient Education:  Go to Education Activity  Goal: Patient/Family Education  Outcome: Progressing Towards Goal     Problem: General Medical Care Plan  Goal: *Vital signs within specified parameters  Outcome: Progressing Towards Goal  Goal: *Labs within defined limits  Outcome: Progressing Towards Goal  Goal: *Absence of infection signs and symptoms  Outcome: Progressing Towards Goal  Goal: *Optimal pain control at patient's stated goal  Outcome: Progressing Towards Goal  Goal: *Skin integrity maintained  Outcome: Progressing Towards Goal  Goal: *Fluid volume balance  Outcome: Progressing Towards Goal  Goal: *Optimize nutritional status  Outcome: Progressing Towards Goal  Goal: *Anxiety reduced or absent  Outcome: Progressing Towards Goal  Goal: *Progressive mobility and function (eg: ADL's)  Outcome: Progressing Towards Goal        Problem: Risk for Spread of Infection  Goal: Prevent transmission of infectious organism to others  Description: Prevent the transmission of infectious organisms to other patients, staff members, and visitors.   Outcome: Progressing Towards Goal

## 2023-04-24 NOTE — PROGRESS NOTES
Problem: Falls - Risk of  Goal: *Absence of Falls  Description: Document Jeannette Marvin Fall Risk and appropriate interventions in the flowsheet. Outcome: Progressing Towards Goal  Note: Fall Risk Interventions:                                Problem: Patient Education: Go to Patient Education Activity  Goal: Patient/Family Education  Outcome: Progressing Towards Goal     Problem: Risk for Spread of Infection  Goal: Prevent transmission of infectious organism to others  Description: Prevent the transmission of infectious organisms to other patients, staff members, and visitors.   Outcome: Progressing Towards Goal     Problem: Patient Education:  Go to Education Activity  Goal: Patient/Family Education  Outcome: Progressing Towards Goal     Problem: General Medical Care Plan  Goal: *Vital signs within specified parameters  Outcome: Progressing Towards Goal  Goal: *Labs within defined limits  Outcome: Progressing Towards Goal  Goal: *Absence of infection signs and symptoms  Outcome: Progressing Towards Goal  Goal: *Optimal pain control at patient's stated goal  Outcome: Progressing Towards Goal  Goal: *Skin integrity maintained  Outcome: Progressing Towards Goal  Goal: *Fluid volume balance  Outcome: Progressing Towards Goal  Goal: *Optimize nutritional status  Outcome: Progressing Towards Goal  Goal: *Anxiety reduced or absent  Outcome: Progressing Towards Goal  Goal: *Progressive mobility and function (eg: ADL's)  Outcome: Progressing Towards Goal     Problem: Patient Education: Go to Patient Education Activity  Goal: Patient/Family Education  Outcome: Progressing Towards Goal

## 2023-04-24 NOTE — ROUTINE PROCESS
Reached out to Dr. Lieutenant Baum regarding patient continued coughing and need for cough med, awaiting return text.

## 2023-04-24 NOTE — ACP (ADVANCE CARE PLANNING)
Advance Care Planning   Healthcare Decision Maker:       Primary Decision Maker: Kaylee Celia - [facility] - [phone]    Click here to complete [id number] including selection of the Healthcare Decision Maker Relationship (ie \"Primary\")

## 2023-04-24 NOTE — ROUTINE PROCESS
In for meds, patient report already taking Hydroxyurea and Crestor, did accept Jinny Overton. Advised patient that MD did not order breathing treatments based on her CT scan report. Patient reports that she could cough at home. Reassured that MD was assessing her results and would write further orders. Spoke with Nsg Supervisor about patient's concern for treatment. Also notified MD of patient's concern.

## 2023-04-24 NOTE — PROGRESS NOTES
Problem: Falls - Risk of  Goal: *Absence of Falls  Description: Document Fam Frazier Fall Risk and appropriate interventions in the flowsheet. Outcome: Progressing Towards Goal  Note: Fall Risk Interventions:                                Problem: Patient Education: Go to Patient Education Activity  Goal: Patient/Family Education  Outcome: Progressing Towards Goal     Problem: Risk for Spread of Infection  Goal: Prevent transmission of infectious organism to others  Description: Prevent the transmission of infectious organisms to other patients, staff members, and visitors.   Outcome: Progressing Towards Goal     Problem: Patient Education:  Go to Education Activity  Goal: Patient/Family Education  Outcome: Progressing Towards Goal     Problem: General Medical Care Plan  Goal: *Vital signs within specified parameters  Outcome: Progressing Towards Goal  Goal: *Labs within defined limits  Outcome: Progressing Towards Goal  Goal: *Absence of infection signs and symptoms  Outcome: Progressing Towards Goal  Goal: *Optimal pain control at patient's stated goal  Outcome: Progressing Towards Goal  Goal: *Skin integrity maintained  Outcome: Progressing Towards Goal  Goal: *Fluid volume balance  Outcome: Progressing Towards Goal  Goal: *Optimize nutritional status  Outcome: Progressing Towards Goal  Goal: *Anxiety reduced or absent  Outcome: Progressing Towards Goal  Goal: *Progressive mobility and function (eg: ADL's)  Outcome: Progressing Towards Goal     Problem: Patient Education: Go to Patient Education Activity  Goal: Patient/Family Education  Outcome: Progressing Towards Goal

## 2023-04-24 NOTE — ROUTINE PROCESS
MD advised CT was ordered as routine, he now wants it as soon as possible, CT notified of change. Patient informed of  new orders and that the MD has been in contact for her continue cough. Patient could be heard wheezing.

## 2023-04-24 NOTE — PROGRESS NOTES
Pt presented with persistent non-productive cough and upper airway wheezing reported. Pt afebrile, hemodynamically stable and w/o hypoxia.   No acute pathology noted on CXR and follow up CT soft tissue neck results penidng    Thrombocytosis  - Chronic presentation w/ PLT count stable  - Established Hydroxyurea resumed  - Monitor blood counts    Upper airway cough syndrome  - No reported hx of asthma or COPD w/ persistent non-productive cough and upper airway wheezing  - No hypoxia present with symptoms minimized with nasal inhalation and no acute pathology on CXR  - Follow up CT soft tissue neck ordered and continue anti-tussive tx

## 2023-04-24 NOTE — ACP (ADVANCE CARE PLANNING)
Advance Care Planning   Healthcare Decision Maker:       Primary Decision Maker: Radha Caity - Spouse - [phone]    Click here to complete [email or web address] including selection of the Healthcare Decision Maker Relationship (ie \"Primary\")

## 2023-04-24 NOTE — ASSESSMENT & PLAN NOTE
- Chronic presentation w/ PLT count stable  - Established Hydroxyurea resumed  - Monitor blood counts

## 2023-04-24 NOTE — ASSESSMENT & PLAN NOTE
- No reported hx of asthma or COPD w/ persistent non-productive cough and upper airway wheezing  - No hypoxia present with symptoms minimized with nasal inhalation and no acute pathology on CXR  - Follow up CT soft tissue neck ordered and continue anti-tussive tx

## 2023-04-25 PROCEDURE — 65270000029 HC RM PRIVATE

## 2023-04-25 PROCEDURE — 74011000250 HC RX REV CODE- 250: Performed by: INTERNAL MEDICINE

## 2023-04-25 PROCEDURE — 74011250636 HC RX REV CODE- 250/636: Performed by: INTERNAL MEDICINE

## 2023-04-25 PROCEDURE — 74011250637 HC RX REV CODE- 250/637: Performed by: HOSPITALIST

## 2023-04-25 PROCEDURE — 74011250637 HC RX REV CODE- 250/637: Performed by: INTERNAL MEDICINE

## 2023-04-25 PROCEDURE — 74011000250 HC RX REV CODE- 250: Performed by: HOSPITALIST

## 2023-04-25 PROCEDURE — 94640 AIRWAY INHALATION TREATMENT: CPT

## 2023-04-25 RX ORDER — PHENYTOIN SODIUM 100 MG/1
300 CAPSULE, EXTENDED RELEASE ORAL
Status: DISCONTINUED | OUTPATIENT
Start: 2023-04-26 | End: 2023-04-26 | Stop reason: HOSPADM

## 2023-04-25 RX ADMIN — GUAIFENESIN AND CODEINE PHOSPHATE 5 ML: 100; 10 SOLUTION ORAL at 08:44

## 2023-04-25 RX ADMIN — IPRATROPIUM BROMIDE AND ALBUTEROL SULFATE 3 ML: .5; 3 SOLUTION RESPIRATORY (INHALATION) at 14:29

## 2023-04-25 RX ADMIN — SODIUM CHLORIDE, PRESERVATIVE FREE 10 ML: 5 INJECTION INTRAVENOUS at 12:45

## 2023-04-25 RX ADMIN — IPRATROPIUM BROMIDE AND ALBUTEROL SULFATE 3 ML: .5; 3 SOLUTION RESPIRATORY (INHALATION) at 19:26

## 2023-04-25 RX ADMIN — ROSUVASTATIN CALCIUM 10 MG: 10 TABLET, FILM COATED ORAL at 21:29

## 2023-04-25 RX ADMIN — BENZONATATE 100 MG: 100 CAPSULE ORAL at 17:31

## 2023-04-25 RX ADMIN — DOXYCYCLINE 100 MG: 100 CAPSULE ORAL at 21:29

## 2023-04-25 RX ADMIN — GUAIFENESIN AND CODEINE PHOSPHATE 5 ML: 100; 10 SOLUTION ORAL at 12:45

## 2023-04-25 RX ADMIN — SODIUM CHLORIDE, PRESERVATIVE FREE 10 ML: 5 INJECTION INTRAVENOUS at 21:31

## 2023-04-25 RX ADMIN — DOXYCYCLINE 100 MG: 100 CAPSULE ORAL at 08:08

## 2023-04-25 RX ADMIN — SODIUM CHLORIDE, PRESERVATIVE FREE 10 ML: 5 INJECTION INTRAVENOUS at 17:31

## 2023-04-25 RX ADMIN — METHYLPREDNISOLONE SODIUM SUCCINATE 40 MG: 40 INJECTION, POWDER, FOR SOLUTION INTRAMUSCULAR; INTRAVENOUS at 12:45

## 2023-04-25 RX ADMIN — GUAIFENESIN AND CODEINE PHOSPHATE 5 ML: 100; 10 SOLUTION ORAL at 22:02

## 2023-04-25 RX ADMIN — SODIUM CHLORIDE, PRESERVATIVE FREE 10 ML: 5 INJECTION INTRAVENOUS at 06:02

## 2023-04-25 RX ADMIN — IPRATROPIUM BROMIDE AND ALBUTEROL SULFATE 3 ML: .5; 3 SOLUTION RESPIRATORY (INHALATION) at 01:01

## 2023-04-25 RX ADMIN — METHYLPREDNISOLONE SODIUM SUCCINATE 40 MG: 40 INJECTION, POWDER, FOR SOLUTION INTRAMUSCULAR; INTRAVENOUS at 05:48

## 2023-04-25 RX ADMIN — PHENYTOIN SODIUM 300 MG: 100 CAPSULE ORAL at 07:53

## 2023-04-25 RX ADMIN — BENZONATATE 100 MG: 100 CAPSULE ORAL at 08:08

## 2023-04-25 RX ADMIN — GUAIFENESIN AND CODEINE PHOSPHATE 5 ML: 100; 10 SOLUTION ORAL at 17:31

## 2023-04-25 RX ADMIN — BENZONATATE 100 MG: 100 CAPSULE ORAL at 21:31

## 2023-04-25 RX ADMIN — METHYLPREDNISOLONE SODIUM SUCCINATE 40 MG: 40 INJECTION, POWDER, FOR SOLUTION INTRAMUSCULAR; INTRAVENOUS at 17:31

## 2023-04-25 RX ADMIN — IPRATROPIUM BROMIDE AND ALBUTEROL SULFATE 3 ML: .5; 3 SOLUTION RESPIRATORY (INHALATION) at 07:18

## 2023-04-25 NOTE — ROUTINE PROCESS
Patient has been awake, alert and oriented, no c/o pain, tolerating on RA, congested cough noted, administered PRN cough medication, wheezing throughout all lung fields as well as Rhonchi noted. Nebs scheduled and administered as ordered.

## 2023-04-25 NOTE — ROUTINE PROCESS
Patient has own Dilantin 330mg PO at bedside that she takes, Dilantin 300mg that was pulled from [employer] was returned.

## 2023-04-25 NOTE — ROUTINE PROCESS
The pt is awake having another coughing episode. She was given robitussin AC 5ml for cough. Will continue to monitor.

## 2023-04-25 NOTE — PROGRESS NOTES
Problem: Falls - Risk of  Goal: *Absence of Falls  Description: Document Gonzalo Bradford Fall Risk and appropriate interventions in the flowsheet.   Outcome: Progressing Towards Goal  Note: Fall Risk Interventions:            Medication Interventions: Teach patient to arise slowly

## 2023-04-25 NOTE — ROUTINE PROCESS
Patient has own Dilantin 330mg PO at bedside that she takes, Dilantin 300mg that was pulled from [facility] [place] was returned.

## 2023-04-25 NOTE — ROUTINE PROCESS
The pt have been resting awake in bed with no increased respiratory distress noted. She continue to have coughing episodes. No c/o of any pain is being voiced. She is tolerating being up to the bathroom. The present Iv site remains patent. She have had visitors in. 0

## 2023-04-25 NOTE — ROUTINE PROCESS
Report was received from the offgoing nurse [facility] RN. Care was resumed via the incoming nurse [facility] JOSEF PARSONS. The report consist of using kardex information.

## 2023-04-25 NOTE — ROUTINE PROCESS
The pt have been sleeping. She does have audible whheezing at times as well as coughing episodes with cough syrup given. No other c/o is being voiced. Will continue to monitor.

## 2023-04-25 NOTE — ROUTINE PROCESS
The pt have been resting awake in bed with no increased respiratory distress noted. She continue to have coughing episodes. No c/o of any pain is being voiced. She is tolerating being up to the bathroom. The present Iv site remains patent. She have had visitors in.

## 2023-04-25 NOTE — PROGRESS NOTES
Hospitalist Progress Note         Carlo Hernandez MD          Daily Progress Note: 4/25/2023      Subjective: The patient is seen for follow  up. Problem List:  Problem List as of 4/25/2023 Date Reviewed: 5/5/2021            Codes Class Noted - Resolved    COPD with acute exacerbation ([facility] [address] ICD-10-CM: J44.1  ICD-9-CM: 491.21  4/24/2023 - Present        Upper airway cough syndrome ICD-10-CM: R05.8  ICD-9-CM: 786.2  4/23/2023 - Present        Thrombocytosis ICD-10-CM: C51.253  ICD-9-CM: 238.71  4/23/2023 - Present        RESOLVED: TIA (transient ischemic attack) ICD-10-CM: G45.9  ICD-9-CM: 435.9  3/13/2020 - 3/14/2020           Medications reviewed  Current Facility-Administered Medications   Medication Dose Route Frequency    [START ON 4/26/2023] phenytoin ER (DILANTIN ER) ER capsule 300 mg  300 mg Oral ACB    rosuvastatin (CRESTOR) tablet 10 mg  10 mg Oral QHS    sodium chloride (NS) flush 5-40 mL  5-40 mL IntraVENous Q8H    sodium chloride (NS) flush 5-40 mL  5-40 mL IntraVENous PRN    polyethylene glycol (MIRALAX) packet 17 g  17 g Oral DAILY PRN    benzonatate (TESSALON) capsule 100 mg  100 mg Oral TID    guaiFENesin-codeine (ROBITUSSIN AC) 100-10 mg/5 mL solution 5 mL  5 mL Oral Q4H PRN    phenytoin ER (DILANTIN ER) ER capsule 30 mg (Patient Supplied)  30 mg Oral ACB    methylPREDNISolone (PF) (SOLU-MEDROL) injection 40 mg  40 mg IntraVENous Q6H    doxycycline (MONODOX) capsule 100 mg  100 mg Oral Q12H    albuterol-ipratropium (DUO-NEB) 2.5 MG-0.5 MG/3 ML  3 mL Nebulization Q6H RT    [START ON 4/26/2023] hydroxyurea (HYDREA) chemo cap 1,500 mg  1,500 mg Oral EVERY OTHER DAY    acetaminophen (TYLENOL) tablet 650 mg  650 mg Oral Q6H PRN    ondansetron (ZOFRAN ODT) tablet 4 mg  4 mg Oral Q8H PRN       Review of Systems:   A comprehensive review of systems was negative except for that written in the HPI.     Objective:   Physical Exam:     Visit Vitals  BP (!) 112/51   Pulse 75 Temp 97.2 °F (36.2 °C)   Resp 22   Ht 5' 7.99\" (1.727 m)   Wt 145.7 kg (321 lb 1.6 oz)   SpO2 93%   BMI 48.83 kg/m²      O2 Device: None (Room air)    Temp (24hrs), Av °F (36.7 °C), Min:97.2 °F (36.2 °C), Max:98.4 °F (36.9 °C)    No intake/output data recorded.  1901 -  0700  In: 5 [P.O.:400; I.V.:20]  Out: -     General:  Alert, cooperative, no distress, appears stated age. Lungs:   Bilateral expiratory wheezing   Chest wall:  No tenderness or deformity. Heart:  Regular rate and rhythm, S1, S2 normal, no murmur, click, rub or gallop. Abdomen:   Soft, non-tender. Bowel sounds normal. No masses,  No organomegaly. Extremities: Extremities normal, atraumatic, no cyanosis or edema. Pulses: 2+ and symmetric all extremities. Skin: Skin color, texture, turgor normal. No rashes or lesions   Neurologic: CNII-XII intact. No gross sensory or motor deficits     Data Review:       Recent Days:  Recent Labs     23  0520 23  1710   WBC 2.4* 2.3*   HGB 10.9* 12.0   HCT 33.2* 35.9    351     Recent Labs     23  0520 23  1710    140   K 3.7 3.6    104   CO2 30 28   * 109*   BUN 11 14   CREA 0.69 0.80   CA 8.2* 8.6   ALB 3.7 3.8   TBILI 0.4 0.3   ALT 24 27   INR  --  1.2*     No results for input(s): PH, PCO2, PO2, HCO3, FIO2 in the last 72 hours. 24 Hour Results:  No results found for this or any previous visit (from the past 24 hour(s)). Assessment/     Acute COPD exacerbation    -Continue IV Solu-Medrol 40 mg every 6 hours    -DuoNeb treatments Q6    -Doxycycline 100 mg oral twice daily    -Robitussin 5 mL every 4 hours as needed for cough     History of seizure disorder    -Resume home Dilantin    -Seizure precautions     Marked morbid obesity    -BMI of greater than 48    -Counseled on diet and exercising    -Obtain A1c level     Cardiac dysrhythmia    -Noted with brief run of HORACE  Tach    -We will continue to monitor daily electrolytes including magnesium and potassium    -Replete as needed     Probably obstructive sleep apnea    -Patient advised to get sleep study done outpatient          Plan:  Continue supportive care  Encourage ambulation  Anticipate discharge to home in 24 to 48 hours if clinically improved      Care Plan discussed with: Patient/Family    Total time spent with patient: 30 minutes.     Shona Sheppard MD

## 2023-04-25 NOTE — PROGRESS NOTES
Hospitalist Progress Note         Coral Griffiths MD          Daily Progress Note: 4/25/2023      Subjective: The patient is seen for follow  up. Problem List:  Problem List as of 4/25/2023 Date Reviewed: 5/5/2021            Codes Class Noted - Resolved    COPD with acute exacerbation ([facility] [place] [address] ICD-10-CM: J44.1  ICD-9-CM: 491.21  4/24/2023 - Present        Upper airway cough syndrome ICD-10-CM: R05.8  ICD-9-CM: 786.2  4/23/2023 - Present        Thrombocytosis ICD-10-CM: B99.357  ICD-9-CM: 238.71  4/23/2023 - Present        RESOLVED: TIA (transient ischemic attack) ICD-10-CM: G45.9  ICD-9-CM: 435.9  3/13/2020 - 3/14/2020           Medications reviewed  Current Facility-Administered Medications   Medication Dose Route Frequency    [START ON 4/26/2023] phenytoin ER (DILANTIN ER) ER capsule 300 mg  300 mg Oral ACB    rosuvastatin (CRESTOR) tablet 10 mg  10 mg Oral QHS    sodium chloride (NS) flush 5-40 mL  5-40 mL IntraVENous Q8H    sodium chloride (NS) flush 5-40 mL  5-40 mL IntraVENous PRN    polyethylene glycol (MIRALAX) packet 17 g  17 g Oral DAILY PRN    benzonatate (TESSALON) capsule 100 mg  100 mg Oral TID    guaiFENesin-codeine (ROBITUSSIN AC) 100-10 mg/5 mL solution 5 mL  5 mL Oral Q4H PRN    phenytoin ER (DILANTIN ER) ER capsule 30 mg (Patient Supplied)  30 mg Oral ACB    methylPREDNISolone (PF) (SOLU-MEDROL) injection 40 mg  40 mg IntraVENous Q6H    doxycycline (MONODOX) capsule 100 mg  100 mg Oral Q12H    albuterol-ipratropium (DUO-NEB) 2.5 MG-0.5 MG/3 ML  3 mL Nebulization Q6H RT    [START ON 4/26/2023] hydroxyurea (HYDREA) chemo cap 1,500 mg  1,500 mg Oral EVERY OTHER DAY    acetaminophen (TYLENOL) tablet 650 mg  650 mg Oral Q6H PRN    ondansetron (ZOFRAN ODT) tablet 4 mg  4 mg Oral Q8H PRN       Review of Systems:   A comprehensive review of systems was negative except for that written in the HPI.     Objective:   Physical Exam:     Visit Vitals  BP (!) 112/51   Pulse 75 Temp 97.2 °F (36.2 °C)   Resp 22   Ht 5' 7.99\" (1.727 m)   Wt 145.7 kg (321 lb 1.6 oz)   SpO2 93%   BMI 48.83 kg/m²      O2 Device: None (Room air)    Temp (24hrs), Av °F (36.7 °C), Min:97.2 °F (36.2 °C), Max:98.4 °F (36.9 °C)    No intake/output data recorded.  1901 -  0700  In: 5 [P.O.:400; I.V.:20]  Out: -     General:  Alert, cooperative, no distress, appears stated age. Lungs:   Bilateral expiratory wheezing   Chest wall:  No tenderness or deformity. Heart:  Regular rate and rhythm, S1, S2 normal, no murmur, click, rub or gallop. Abdomen:   Soft, non-tender. Bowel sounds normal. No masses,  No organomegaly. Extremities: Extremities normal, atraumatic, no cyanosis or edema. Pulses: 2+ and symmetric all extremities. Skin: Skin color, texture, turgor normal. No rashes or lesions   Neurologic: CNII-XII intact. No gross sensory or motor deficits     Data Review:       Recent Days:  Recent Labs     23  0520 23  1710   WBC 2.4* 2.3*   HGB 10.9* 12.0   HCT 33.2* 35.9    351     Recent Labs     23  0520 23  1710    140   K 3.7 3.6    104   CO2 30 28   * 109*   BUN 11 14   CREA 0.69 0.80   CA 8.2* 8.6   ALB 3.7 3.8   TBILI 0.4 0.3   ALT 24 27   INR  --  1.2*     No results for input(s): PH, PCO2, PO2, HCO3, FIO2 in the last 72 hours. 24 Hour Results:  No results found for this or any previous visit (from the past 24 hour(s)). Assessment/     Acute COPD exacerbation    -Continue IV Solu-Medrol 40 mg every 6 hours    -DuoNeb treatments Q6    -Doxycycline 100 mg oral twice daily    -Robitussin 5 mL every 4 hours as needed for cough     History of seizure disorder    -Resume home Dilantin    -Seizure precautions     Marked morbid obesity    -BMI of greater than 48    -Counseled on diet and exercising    -Obtain A1c level     Cardiac dysrhythmia    -Noted with brief run of HORACE  Tach    -We will continue to monitor daily electrolytes including magnesium and potassium    -Replete as needed     Probably obstructive sleep apnea    -Patient advised to get sleep study done outpatient          Plan:  Continue supportive care  Encourage ambulation  Anticipate discharge to home in 24 to 48 hours if clinically improved      Care Plan discussed with: Patient/Family    Total time spent with patient: 30 minutes.     Leticia Brennan MD

## 2023-04-25 NOTE — ROUTINE PROCESS
Report was received from the offgoing nurse [facility] RN. Care was resumed via the incoming nurse [facility] JOSEF PARSONS. The report consist of using kardex information.

## 2023-04-25 NOTE — PROGRESS NOTES
Problem: Falls - Risk of  Goal: *Absence of Falls  Description: Document [facility] Fall Risk and appropriate interventions in the flowsheet.   Outcome: Progressing Towards Goal  Note: Fall Risk Interventions:            Medication Interventions: Teach patient to arise slowly

## 2023-04-25 NOTE — ROUTINE PROCESS
The pt have had a restful night. There have been no c/o of increased SOB. She continue to have coughing episodes at times. She have audible wheezing at times. Will continue to monitor. She is tolerating being up to the bathroom.

## 2023-04-26 VITALS
WEIGHT: 293 LBS | RESPIRATION RATE: 20 BRPM | HEIGHT: 68 IN | HEART RATE: 65 BPM | SYSTOLIC BLOOD PRESSURE: 152 MMHG | BODY MASS INDEX: 44.41 KG/M2 | TEMPERATURE: 96 F | OXYGEN SATURATION: 95 % | DIASTOLIC BLOOD PRESSURE: 85 MMHG

## 2023-04-26 LAB
BACTERIA SPEC CULT: ABNORMAL
BACTERIA SPEC CULT: ABNORMAL
SPECIAL REQUESTS,SREQ: ABNORMAL

## 2023-04-26 PROCEDURE — 94640 AIRWAY INHALATION TREATMENT: CPT

## 2023-04-26 PROCEDURE — 74011250637 HC RX REV CODE- 250/637: Performed by: HOSPITALIST

## 2023-04-26 PROCEDURE — 74011000250 HC RX REV CODE- 250: Performed by: HOSPITALIST

## 2023-04-26 PROCEDURE — 74011000250 HC RX REV CODE- 250: Performed by: INTERNAL MEDICINE

## 2023-04-26 PROCEDURE — 74011250636 HC RX REV CODE- 250/636: Performed by: INTERNAL MEDICINE

## 2023-04-26 PROCEDURE — G0378 HOSPITAL OBSERVATION PER HR: HCPCS

## 2023-04-26 PROCEDURE — 74011250637 HC RX REV CODE- 250/637: Performed by: INTERNAL MEDICINE

## 2023-04-26 RX ORDER — AMLODIPINE BESYLATE 10 MG/1
10 TABLET ORAL DAILY
Qty: 30 TABLET | Refills: 2 | Status: SHIPPED | OUTPATIENT
Start: 2023-04-26 | End: 2023-05-26

## 2023-04-26 RX ORDER — PREDNISONE 20 MG/1
20 TABLET ORAL 2 TIMES DAILY
Qty: 14 TABLET | Refills: 0 | Status: SHIPPED | OUTPATIENT
Start: 2023-04-26 | End: 2023-05-03

## 2023-04-26 RX ORDER — ALBUTEROL SULFATE 90 UG/1
2 AEROSOL, METERED RESPIRATORY (INHALATION)
Qty: 18 G | Refills: 2 | Status: SHIPPED | OUTPATIENT
Start: 2023-04-26 | End: 2023-05-26

## 2023-04-26 RX ORDER — CODEINE PHOSPHATE AND GUAIFENESIN 10; 100 MG/5ML; MG/5ML
5 SOLUTION ORAL
Qty: 118 ML | Refills: 0 | Status: SHIPPED | OUTPATIENT
Start: 2023-04-26 | End: 2023-04-29

## 2023-04-26 RX ORDER — DOXYCYCLINE 100 MG/1
100 CAPSULE ORAL EVERY 12 HOURS
Qty: 14 CAPSULE | Refills: 0 | Status: SHIPPED | OUTPATIENT
Start: 2023-04-26 | End: 2023-05-03

## 2023-04-26 RX ORDER — BUDESONIDE AND FORMOTEROL FUMARATE DIHYDRATE 160; 4.5 UG/1; UG/1
2 AEROSOL RESPIRATORY (INHALATION) 2 TIMES DAILY
Qty: 10.2 G | Refills: 2 | Status: SHIPPED | OUTPATIENT
Start: 2023-04-26 | End: 2023-05-26

## 2023-04-26 RX ORDER — AMLODIPINE BESYLATE 10 MG/1
10 TABLET ORAL DAILY
Status: DISCONTINUED | OUTPATIENT
Start: 2023-04-26 | End: 2023-04-26 | Stop reason: HOSPADM

## 2023-04-26 RX ADMIN — DOXYCYCLINE 100 MG: 100 CAPSULE ORAL at 08:00

## 2023-04-26 RX ADMIN — IPRATROPIUM BROMIDE AND ALBUTEROL SULFATE 3 ML: .5; 3 SOLUTION RESPIRATORY (INHALATION) at 01:03

## 2023-04-26 RX ADMIN — SODIUM CHLORIDE, PRESERVATIVE FREE 10 ML: 5 INJECTION INTRAVENOUS at 06:04

## 2023-04-26 RX ADMIN — GUAIFENESIN AND CODEINE PHOSPHATE 5 ML: 100; 10 SOLUTION ORAL at 08:06

## 2023-04-26 RX ADMIN — IPRATROPIUM BROMIDE AND ALBUTEROL SULFATE 3 ML: .5; 3 SOLUTION RESPIRATORY (INHALATION) at 07:03

## 2023-04-26 RX ADMIN — METHYLPREDNISOLONE SODIUM SUCCINATE 40 MG: 40 INJECTION, POWDER, FOR SOLUTION INTRAMUSCULAR; INTRAVENOUS at 00:04

## 2023-04-26 RX ADMIN — AMLODIPINE BESYLATE 10 MG: 10 TABLET ORAL at 08:00

## 2023-04-26 RX ADMIN — HYDROXYUREA 1500 MG: 500 CAPSULE ORAL at 08:00

## 2023-04-26 RX ADMIN — METHYLPREDNISOLONE SODIUM SUCCINATE 40 MG: 40 INJECTION, POWDER, FOR SOLUTION INTRAMUSCULAR; INTRAVENOUS at 06:04

## 2023-04-26 NOTE — ROUTINE PROCESS
Discharge information and discharge medications discussed with patient and all questions answered. Patient escorted to vehicle via w/chair.

## 2023-04-26 NOTE — DISCHARGE SUMMARY
Physician Discharge Summary     Patient ID:    Meron Alexis  [id number]  59 y.o.  1965    Admit date: 4/23/2023    Discharge date : 4/26/2023    Chronic Diagnoses:    Problem List as of 4/26/2023 Date Reviewed: 5/5/2021            Codes Class Noted - Resolved    COPD with acute exacerbation ([facility] ICD-10-CM: J44.1  ICD-9-CM: 491.21  4/24/2023 - Present        Upper airway cough syndrome ICD-10-CM: R05.8  ICD-9-CM: 786.2  4/23/2023 - Present        Thrombocytosis ICD-10-CM: X66.397  ICD-9-CM: 238.71  4/23/2023 - Present        RESOLVED: TIA (transient ischemic attack) ICD-10-CM: G45.9  ICD-9-CM: 435.9  3/13/2020 - 3/14/2020       22    Final Diagnoses:   Asthma exacerbation, non-allergic, moderate persistent [J45.41]  COPD with acute exacerbation ([facility] [J44.1]    Reason for Hospitalization:  Shortness of breath and wheezing      Hospital Course:    59 y.o. female who presents with reports of nonproductive cough and shortness of breath for the last 3 weeks. Symptoms appear to be worsening over the last few days therefore she decided to come for evaluation. Chest x-ray negative for obvious consolidation. She was noted with bilateral expiratory wheezing which did not improve with DuoNeb treatments. Admitted for further DuoNeb treatments and IV steroids. CT of the neck did not reveal any acute pathology. She was admitted to medical telemetry floor and treated with IV steroids and DuoNeb treatments with some improvement. Still has some mild expiratory wheezing. She would benefit from pulmonary function testing outpatient. She has been referred to pulmonologist.  We will switch to oral prednisone on oral antibiotics to complete at least a week course. Overall stable for discharge to home with outpatient follow-up    Symbicort added to medication regimen.           Discharge Medications:   Current Discharge Medication List        START taking these medications    Details   amLODIPine (NORVASC) 10 mg tablet Take 1 Tablet by mouth daily for 30 days. Qty: 30 Tablet, Refills: 2  Start date: 4/26/2023, End date: 5/26/2023      doxycycline (MONODOX) 100 mg capsule Take 1 Capsule by mouth every twelve (12) hours for 7 days. Qty: 14 Capsule, Refills: 0  Start date: 4/26/2023, End date: 5/3/2023      guaiFENesin-codeine (ROBITUSSIN AC) 100-10 mg/5 mL solution Take 5 mL by mouth every four (4) hours as needed for Cough for up to 3 days. Max Daily Amount: 30 mL. Qty: 118 mL, Refills: 0  Start date: 4/26/2023, End date: 4/29/2023    Associated Diagnoses: COPD with acute exacerbation (HCC)      predniSONE (DELTASONE) 20 mg tablet Take 1 Tablet by mouth two (2) times a day for 7 days. Qty: 14 Tablet, Refills: 0  Start date: 4/26/2023, End date: 5/3/2023      albuterol (ProAir HFA) 90 mcg/actuation inhaler Take 2 Puffs by inhalation every four (4) hours as needed for Wheezing for up to 30 days. Qty: 18 g, Refills: 2  Start date: 4/26/2023, End date: 5/26/2023           CONTINUE these medications which have NOT CHANGED    Details   hydroxyurea (HYDREA) 500 mg capsule THREE CAPSULES BY MOUTH EVERY OTHER DAY AT BEDTIME - LEUKEMIA  Qty: 45 Capsule, Refills: 5    Associated Diagnoses: Thrombocytosis      rosuvastatin (CRESTOR) 10 mg tablet Take 1 Tablet by mouth nightly. LORazepam (ATIVAN) 0.5 mg tablet Take 1 Tablet by mouth daily as needed. aspirin (ASPIRIN) 325 mg tablet Take 1 Tab by mouth daily. Qty: 30 Tab, Refills: 0      !! phenytoin ER (DILANTIN ER) 100 mg ER capsule Take 3 Capsules by mouth Daily (before breakfast). Take with Dilantin 30 mg capsule for total daily dose of 330 mg every morning      ! ! phenytoin ER (DILANTIN ER) 30 mg ER capsule Take 1 Capsule by mouth Daily (before breakfast). Take with Dilantin 300 mg capsule for total daily dose of 330 mg every morning       !! - Potential duplicate medications found. Please discuss with provider. Follow up Care:    1.  Mickie Dash, JUMA Wyman in 1-2 weeks. Please call to set up an appointment shortly after discharge. Diet:  Cardiac Diet    Disposition:  Home. Advanced Directive:   FULL    DNR      Discharge Exam:  Visit Vitals  BP (!) 152/85 (BP 1 Location: Right upper arm)   Pulse 65   Temp (!) 96 °F (35.6 °C)   Resp 20   Ht 5' 7.99\" (1.727 m)   Wt 145.7 kg (321 lb 1.6 oz)   SpO2 95%   BMI 48.83 kg/m²      O2 Device: None (Room air)    Temp (24hrs), Av.4 °F (36.3 °C), Min:96 °F (35.6 °C), Max:98.6 °F (37 °C)    No intake/output data recorded. No intake/output data recorded. General:  Alert, cooperative, no distress, appears stated age. Lungs:   Clear to auscultation bilaterally. Chest wall:  No tenderness or deformity. Heart:  Regular rate and rhythm, S1, S2 normal, no murmur, click, rub or gallop. Abdomen:   Soft, non-tender. Bowel sounds normal. No masses,  No organomegaly. Extremities: Extremities normal, atraumatic, no cyanosis or edema. Pulses: 2+ and symmetric all extremities. Skin: Skin color, texture, turgor normal. No rashes or lesions   Neurologic: CNII-XII intact. No gross sensory or motor deficits         CONSULTATIONS: None    Significant Diagnostic Studies:   2023: BUN 14 mg/dL (Ref range: 6 - 20 mg/dL); Calcium 8.6 mg/dL (Ref range: 8.5 - 10.1 mg/dL); CO2 28 mmol/L (Ref range: 21 - 32 mmol/L); Creatinine 0.80 mg/dL (Ref range: 0.55 - 1.02 mg/dL); Glucose 109 mg/dL (H; Ref range: 65 - 100 mg/dL); HCT 35.9 % (Ref range: 35.0 - 47.0 %); HGB 12.0 g/dL (Ref range: 11.5 - 16.0 g/dL); Potassium 3.6 mmol/L (Ref range: 3.5 - 5.1 mmol/L); Sodium 140 mmol/L (Ref range: 136 - 145 mmol/L)  2023: BUN 11 mg/dL (Ref range: 6 - 20 mg/dL); Calcium 8.2 mg/dL (L; Ref range: 8.5 - 10.1 mg/dL); CO2 30 mmol/L (Ref range: 21 - 32 mmol/L); Creatinine 0.69 mg/dL (Ref range: 0.55 - 1.02 mg/dL); Glucose 110 mg/dL (H; Ref range: 65 - 100 mg/dL); HCT 33.2 % (L; Ref range: 35.0 - 47.0 %);  HGB 10.9 g/dL (L; Ref range: 11.5 - 16.0 g/dL); Potassium 3.7 mmol/L (Ref range: 3.5 - 5.1 mmol/L); Sodium 141 mmol/L (Ref range: 136 - 145 mmol/L)  Recent Labs     04/24/23  0520 04/23/23 1710   WBC 2.4* 2.3*   HGB 10.9* 12.0   HCT 33.2* 35.9    351     Recent Labs     04/24/23  0520 04/23/23 1710    140   K 3.7 3.6    104   CO2 30 28   BUN 11 14   CREA 0.69 0.80   * 109*   CA 8.2* 8.6     Recent Labs     04/24/23  0520 04/23/23 1710   ALT 24 27   AP 79 86   TBILI 0.4 0.3   TP 6.7 7.0   ALB 3.7 3.8   GLOB 3.0 3.2     Recent Labs     04/23/23 1710   INR 1.2*   PTP 14.8*      No results for input(s): FE, TIBC, PSAT, FERR in the last 72 hours. No results for input(s): PH, PCO2, PO2 in the last 72 hours. No results for input(s): CPK, CKMB in the last 72 hours.     No lab exists for component: TROPONINI  Lab Results   Component Value Date/Time    Glucose (POC) 97 03/14/2020 07:58 AM    Glucose (POC) 90 03/13/2020 09:12 PM    Glucose (POC) 125 (H) 09/21/2019 11:16 AM    Glucose (POC) 117 (H) 09/19/2019 10:35 PM    Glucose (POC) 93 09/18/2019 02:22 PM       Total Time: 35 minutes    Signed:  J Luis Ma MD  4/26/2023  8:09 AM

## 2023-04-26 NOTE — ROUTINE PROCESS
Bedside shift change report given to [address] (oncoming nurse) by Taye Modi (offgoing nurse). Report included the following information Kardex.

## 2023-04-26 NOTE — PROGRESS NOTES
Problem: Chronic Obstructive Pulmonary Disease (COPD)  Goal: *Oxygen saturation during activity within specified parameters  Outcome: Progressing Towards Goal  Note: Assess pulse ox reading and resp. Status and document  Administer o2 therapy as ordered  Administer rep.  Neb treatments as ordered  Administer meds as ordered  Assist with ADL's

## 2023-04-26 NOTE — PROGRESS NOTES
Problem: Falls - Risk of  Goal: *Absence of Falls  Description: Document Samara Denney Fall Risk and appropriate interventions in the flowsheet. Outcome: Resolved/Met  Note: Fall Risk Interventions:            Medication Interventions: Teach patient to arise slowly                   Problem: Patient Education: Go to Patient Education Activity  Goal: Patient/Family Education  Outcome: Resolved/Met     Problem: Risk for Spread of Infection  Goal: Prevent transmission of infectious organism to others  Description: Prevent the transmission of infectious organisms to other patients, staff members, and visitors. Outcome: Resolved/Met     Problem: Patient Education:  Go to Education Activity  Goal: Patient/Family Education  Outcome: Resolved/Met     Problem: General Medical Care Plan  Goal: *Vital signs within specified parameters  Outcome: Resolved/Met  Goal: *Labs within defined limits  Outcome: Resolved/Met  Goal: *Absence of infection signs and symptoms  Outcome: Resolved/Met  Goal: *Optimal pain control at patient's stated goal  Outcome: Resolved/Met  Goal: *Skin integrity maintained  Outcome: Resolved/Met  Goal: *Fluid volume balance  Outcome: Resolved/Met  Goal: *Optimize nutritional status  Outcome: Resolved/Met  Goal: *Anxiety reduced or absent  Outcome: Resolved/Met  Goal: *Progressive mobility and function (eg: ADL's)  Outcome: Resolved/Met     Problem: Patient Education: Go to Patient Education Activity  Goal: Patient/Family Education  Outcome: Resolved/Met     Problem: Chronic Obstructive Pulmonary Disease (COPD)  Goal: *Oxygen saturation during activity within specified parameters  4/26/2023 0839 by Anshul Leger RN  Outcome: Resolved/Met  4/26/2023 0745 by Anshul Leger RN  Outcome: Progressing Towards Goal  Note: Assess pulse ox reading and resp. Status and document  Administer o2 therapy as ordered  Administer rep.  Neb treatments as ordered  Administer meds as ordered  Assist with ADL's  Goal: *Able to remain out of bed as prescribed  Outcome: Resolved/Met  Goal: *Absence of hypoxia  Outcome: Resolved/Met  Goal: *Optimize nutritional status  Outcome: Resolved/Met     Problem: Patient Education: Go to Patient Education Activity  Goal: Patient/Family Education  Outcome: Resolved/Met

## 2023-04-26 NOTE — ROUTINE PROCESS
The pt have rested well during the night with lesser coughing noted. There have been no change in her respiratory status. She have tolerated liqs well. Will continue to monitor.

## 2023-04-26 NOTE — PROGRESS NOTES
Problem: Falls - Risk of  Goal: *Absence of Falls  Description: Document Mitul Navas Fall Risk and appropriate interventions in the flowsheet. Outcome: Resolved/Met  Note: Fall Risk Interventions:            Medication Interventions: Teach patient to arise slowly                   Problem: Patient Education: Go to Patient Education Activity  Goal: Patient/Family Education  Outcome: Resolved/Met     Problem: Risk for Spread of Infection  Goal: Prevent transmission of infectious organism to others  Description: Prevent the transmission of infectious organisms to other patients, staff members, and visitors. Outcome: Resolved/Met     Problem: Patient Education:  Go to Education Activity  Goal: Patient/Family Education  Outcome: Resolved/Met     Problem: General Medical Care Plan  Goal: *Vital signs within specified parameters  Outcome: Resolved/Met  Goal: *Labs within defined limits  Outcome: Resolved/Met  Goal: *Absence of infection signs and symptoms  Outcome: Resolved/Met  Goal: *Optimal pain control at patient's stated goal  Outcome: Resolved/Met  Goal: *Skin integrity maintained  Outcome: Resolved/Met  Goal: *Fluid volume balance  Outcome: Resolved/Met  Goal: *Optimize nutritional status  Outcome: Resolved/Met  Goal: *Anxiety reduced or absent  Outcome: Resolved/Met  Goal: *Progressive mobility and function (eg: ADL's)  Outcome: Resolved/Met     Problem: Patient Education: Go to Patient Education Activity  Goal: Patient/Family Education  Outcome: Resolved/Met     Problem: Chronic Obstructive Pulmonary Disease (COPD)  Goal: *Oxygen saturation during activity within specified parameters  4/26/2023 0839 by Jameel Jimenez RN  Outcome: Resolved/Met  4/26/2023 0745 by Jameel Jimenez RN  Outcome: Progressing Towards Goal  Note: Assess pulse ox reading and resp. Status and document  Administer o2 therapy as ordered  Administer rep.  Neb treatments as ordered  Administer meds as ordered  Assist with ADL's  Goal: *Able to remain out of bed as prescribed  Outcome: Resolved/Met  Goal: *Absence of hypoxia  Outcome: Resolved/Met  Goal: *Optimize nutritional status  Outcome: Resolved/Met     Problem: Patient Education: Go to Patient Education Activity  Goal: Patient/Family Education  Outcome: Resolved/Met

## 2023-04-26 NOTE — ROUTINE PROCESS
The pt have been resting awake in bed watching tv & talking on the phone. She is showing no signs of increased respiratory distress. She continue to have coughing episodes at times. No c/o of pain is being voiced. She is tolerating being up to the bathroom. The present Iv site remains patent.

## 2023-04-26 NOTE — PROGRESS NOTES
Discharge plan of care/case management plan validated with provider discharge order. Care Management Interventions  PCP Verified by CM: Yes Tita Ledesma)  Last Visit to PCP: 04/17/23  Mode of Transport at Discharge: Other (see comment) ()  Transition of Care Consult (CM Consult): Discharge Planning  Support Systems: Spouse/Significant Other  Confirm Follow Up Transport: Self  The Plan for Transition of Care is Related to the Following Treatment Goals : Patient lives in her own home with her . She is independent with her own care. No discharge needs identified.   Discharge Location  Patient Expects to be Discharged to[de-identified] Home

## 2023-04-26 NOTE — PROGRESS NOTES
Discharge plan of care/case management plan validated with provider discharge order. Care Management Interventions  PCP Verified by CM: Yes Amalia Lagos)  Last Visit to PCP: 04/17/23  Mode of Transport at Discharge: Other (see comment) ()  Transition of Care Consult (CM Consult): Discharge Planning  Support Systems: Spouse/Significant Other  Confirm Follow Up Transport: Self  The Plan for Transition of Care is Related to the Following Treatment Goals : Patient lives in her own home with her . She is independent with her own care. No discharge needs identified.   Discharge Location  Patient Expects to be Discharged to[de-identified] Home

## 2023-04-26 NOTE — DISCHARGE SUMMARY
Physician Discharge Summary     Patient ID:    Malena Rodríguez  [id number]  59 y.o.  1965    Admit date: 4/23/2023    Discharge date : 4/26/2023    Chronic Diagnoses:    Problem List as of 4/26/2023 Date Reviewed: 5/5/2021            Codes Class Noted - Resolved    COPD with acute exacerbation ([facility] ICD-10-CM: J44.1  ICD-9-CM: 491.21  4/24/2023 - Present        Upper airway cough syndrome ICD-10-CM: R05.8  ICD-9-CM: 786.2  4/23/2023 - Present        Thrombocytosis ICD-10-CM: J32.488  ICD-9-CM: 238.71  4/23/2023 - Present        RESOLVED: TIA (transient ischemic attack) ICD-10-CM: G45.9  ICD-9-CM: 435.9  3/13/2020 - 3/14/2020       22    Final Diagnoses:   Asthma exacerbation, non-allergic, moderate persistent [J45.41]  COPD with acute exacerbation ([facility] [J44.1]    Reason for Hospitalization:  Shortness of breath and wheezing      Hospital Course:    59 y.o. female who presents with reports of nonproductive cough and shortness of breath for the last 3 weeks. Symptoms appear to be worsening over the last few days therefore she decided to come for evaluation. Chest x-ray negative for obvious consolidation. She was noted with bilateral expiratory wheezing which did not improve with DuoNeb treatments. Admitted for further DuoNeb treatments and IV steroids. CT of the neck did not reveal any acute pathology. She was admitted to medical telemetry floor and treated with IV steroids and DuoNeb treatments with some improvement. Still has some mild expiratory wheezing. She would benefit from pulmonary function testing outpatient. She has been referred to pulmonologist.  We will switch to oral prednisone on oral antibiotics to complete at least a week course. Overall stable for discharge to home with outpatient follow-up    Symbicort added to medication regimen.           Discharge Medications:   Current Discharge Medication List        START taking these medications    Details   amLODIPine (NORVASC) 10 mg tablet Take 1 Tablet by mouth daily for 30 days. Qty: 30 Tablet, Refills: 2  Start date: 4/26/2023, End date: 5/26/2023      doxycycline (MONODOX) 100 mg capsule Take 1 Capsule by mouth every twelve (12) hours for 7 days. Qty: 14 Capsule, Refills: 0  Start date: 4/26/2023, End date: 5/3/2023      guaiFENesin-codeine (ROBITUSSIN AC) 100-10 mg/5 mL solution Take 5 mL by mouth every four (4) hours as needed for Cough for up to 3 days. Max Daily Amount: 30 mL. Qty: 118 mL, Refills: 0  Start date: 4/26/2023, End date: 4/29/2023    Associated Diagnoses: COPD with acute exacerbation (HCC)      predniSONE (DELTASONE) 20 mg tablet Take 1 Tablet by mouth two (2) times a day for 7 days. Qty: 14 Tablet, Refills: 0  Start date: 4/26/2023, End date: 5/3/2023      albuterol (ProAir HFA) 90 mcg/actuation inhaler Take 2 Puffs by inhalation every four (4) hours as needed for Wheezing for up to 30 days. Qty: 18 g, Refills: 2  Start date: 4/26/2023, End date: 5/26/2023           CONTINUE these medications which have NOT CHANGED    Details   hydroxyurea (HYDREA) 500 mg capsule THREE CAPSULES BY MOUTH EVERY OTHER DAY AT BEDTIME - LEUKEMIA  Qty: 45 Capsule, Refills: 5    Associated Diagnoses: Thrombocytosis      rosuvastatin (CRESTOR) 10 mg tablet Take 1 Tablet by mouth nightly. LORazepam (ATIVAN) 0.5 mg tablet Take 1 Tablet by mouth daily as needed. aspirin (ASPIRIN) 325 mg tablet Take 1 Tab by mouth daily. Qty: 30 Tab, Refills: 0      !! phenytoin ER (DILANTIN ER) 100 mg ER capsule Take 3 Capsules by mouth Daily (before breakfast). Take with Dilantin 30 mg capsule for total daily dose of 330 mg every morning      ! ! phenytoin ER (DILANTIN ER) 30 mg ER capsule Take 1 Capsule by mouth Daily (before breakfast). Take with Dilantin 300 mg capsule for total daily dose of 330 mg every morning       !! - Potential duplicate medications found. Please discuss with provider. Follow up Care:    1.  Tracy Silva, JUMA Wyman in 1-2 weeks. Please call to set up an appointment shortly after discharge. Diet:  Cardiac Diet    Disposition:  Home. Advanced Directive:   FULL    DNR      Discharge Exam:  Visit Vitals  BP (!) 152/85 (BP 1 Location: Right upper arm)   Pulse 65   Temp (!) 96 °F (35.6 °C)   Resp 20   Ht 5' 7.99\" (1.727 m)   Wt 145.7 kg (321 lb 1.6 oz)   SpO2 95%   BMI 48.83 kg/m²      O2 Device: None (Room air)    Temp (24hrs), Av.4 °F (36.3 °C), Min:96 °F (35.6 °C), Max:98.6 °F (37 °C)    No intake/output data recorded. No intake/output data recorded. General:  Alert, cooperative, no distress, appears stated age. Lungs:   Clear to auscultation bilaterally. Chest wall:  No tenderness or deformity. Heart:  Regular rate and rhythm, S1, S2 normal, no murmur, click, rub or gallop. Abdomen:   Soft, non-tender. Bowel sounds normal. No masses,  No organomegaly. Extremities: Extremities normal, atraumatic, no cyanosis or edema. Pulses: 2+ and symmetric all extremities. Skin: Skin color, texture, turgor normal. No rashes or lesions   Neurologic: CNII-XII intact. No gross sensory or motor deficits         CONSULTATIONS: None    Significant Diagnostic Studies:   2023: BUN 14 mg/dL (Ref range: 6 - 20 mg/dL); Calcium 8.6 mg/dL (Ref range: 8.5 - 10.1 mg/dL); CO2 28 mmol/L (Ref range: 21 - 32 mmol/L); Creatinine 0.80 mg/dL (Ref range: 0.55 - 1.02 mg/dL); Glucose 109 mg/dL (H; Ref range: 65 - 100 mg/dL); HCT 35.9 % (Ref range: 35.0 - 47.0 %); HGB 12.0 g/dL (Ref range: 11.5 - 16.0 g/dL); Potassium 3.6 mmol/L (Ref range: 3.5 - 5.1 mmol/L); Sodium 140 mmol/L (Ref range: 136 - 145 mmol/L)  2023: BUN 11 mg/dL (Ref range: 6 - 20 mg/dL); Calcium 8.2 mg/dL (L; Ref range: 8.5 - 10.1 mg/dL); CO2 30 mmol/L (Ref range: 21 - 32 mmol/L); Creatinine 0.69 mg/dL (Ref range: 0.55 - 1.02 mg/dL); Glucose 110 mg/dL (H; Ref range: 65 - 100 mg/dL); HCT 33.2 % (L; Ref range: 35.0 - 47.0 %);  HGB 10.9 g/dL (L; Ref range: 11.5 - 16.0 g/dL); Potassium 3.7 mmol/L (Ref range: 3.5 - 5.1 mmol/L); Sodium 141 mmol/L (Ref range: 136 - 145 mmol/L)  Recent Labs     04/24/23  0520 04/23/23 1710   WBC 2.4* 2.3*   HGB 10.9* 12.0   HCT 33.2* 35.9    351     Recent Labs     04/24/23  0520 04/23/23 1710    140   K 3.7 3.6    104   CO2 30 28   BUN 11 14   CREA 0.69 0.80   * 109*   CA 8.2* 8.6     Recent Labs     04/24/23  0520 04/23/23 1710   ALT 24 27   AP 79 86   TBILI 0.4 0.3   TP 6.7 7.0   ALB 3.7 3.8   GLOB 3.0 3.2     Recent Labs     04/23/23 1710   INR 1.2*   PTP 14.8*      No results for input(s): FE, TIBC, PSAT, FERR in the last 72 hours. No results for input(s): PH, PCO2, PO2 in the last 72 hours. No results for input(s): CPK, CKMB in the last 72 hours.     No lab exists for component: TROPONINI  Lab Results   Component Value Date/Time    Glucose (POC) 97 03/14/2020 07:58 AM    Glucose (POC) 90 03/13/2020 09:12 PM    Glucose (POC) 125 (H) 09/21/2019 11:16 AM    Glucose (POC) 117 (H) 09/19/2019 10:35 PM    Glucose (POC) 93 09/18/2019 02:22 PM       Total Time: 35 minutes    Signed:  Zaid Avery MD  4/26/2023  8:09 AM

## 2023-04-26 NOTE — ROUTINE PROCESS
Bedside shift change report given to [address] (oncoming nurse) by Jorje Perkins (offgoing nurse). Report included the following information Kardex.

## 2023-05-05 ENCOUNTER — TELEPHONE (OUTPATIENT)
Dept: INTERNAL MEDICINE | Age: 58
End: 2023-05-05

## 2023-05-18 RX ORDER — PHENYTOIN SODIUM 100 MG/1
300 CAPSULE, EXTENDED RELEASE ORAL
COMMUNITY

## 2023-05-18 RX ORDER — LORAZEPAM 0.5 MG/1
0.5 TABLET ORAL DAILY PRN
COMMUNITY

## 2023-05-18 RX ORDER — HYDROXYUREA 500 MG/1
CAPSULE ORAL
COMMUNITY
Start: 2023-04-18

## 2023-05-18 RX ORDER — ASPIRIN 325 MG
325 TABLET ORAL DAILY
COMMUNITY
Start: 2020-03-15

## 2023-05-18 RX ORDER — ALBUTEROL SULFATE 90 UG/1
2 AEROSOL, METERED RESPIRATORY (INHALATION) EVERY 4 HOURS PRN
COMMUNITY
Start: 2023-04-26 | End: 2023-06-08

## 2023-05-18 RX ORDER — BUDESONIDE AND FORMOTEROL FUMARATE DIHYDRATE 160; 4.5 UG/1; UG/1
2 AEROSOL RESPIRATORY (INHALATION) 2 TIMES DAILY
Qty: 2 EACH | Refills: 0 | COMMUNITY
Start: 2023-04-26 | End: 2024-05-14

## 2023-05-18 RX ORDER — ROSUVASTATIN CALCIUM 10 MG/1
1 TABLET, COATED ORAL NIGHTLY
COMMUNITY

## 2023-05-18 RX ORDER — AMLODIPINE BESYLATE 10 MG/1
10 TABLET ORAL DAILY
Qty: 30 TABLET | Refills: 0 | COMMUNITY
Start: 2023-04-26 | End: 2024-05-07

## 2023-05-23 ENCOUNTER — TELEPHONE (OUTPATIENT)
Age: 58
End: 2023-05-23

## 2023-06-08 ENCOUNTER — TELEMEDICINE (OUTPATIENT)
Age: 58
End: 2023-06-08
Payer: MEDICARE

## 2023-06-08 DIAGNOSIS — D47.3 ESSENTIAL THROMBOCYTOSIS (HCC): Primary | ICD-10-CM

## 2023-06-08 DIAGNOSIS — D75.839 THROMBOCYTOSIS, UNSPECIFIED: ICD-10-CM

## 2023-06-08 DIAGNOSIS — Z51.11 ENCOUNTER FOR CHEMOTHERAPY MANAGEMENT: ICD-10-CM

## 2023-06-08 PROCEDURE — 99214 OFFICE O/P EST MOD 30 MIN: CPT | Performed by: INTERNAL MEDICINE

## 2023-06-08 RX ORDER — HYDROXYUREA 500 MG/1
CAPSULE ORAL
Qty: 225 CAPSULE | Refills: 1 | Status: SHIPPED | OUTPATIENT
Start: 2023-06-08

## 2023-06-08 NOTE — PROGRESS NOTES
60 y/o cauc female meeting via [employer] for virtual visit for ET. Pt still taking hydrea 1000mg  4 days alernating with 1500mg 3 days. 1. Have you been to the ER, urgent care clinic since your last visit? Hospitalized since your last visit? Yes ED [facility] ASTHMA/COPD  TIA. 4/23/23-4/26/23)    2. Have you seen or consulted any other health care providers outside of the [address] [place] [address] since your last visit? Include any pap smears or colon screening.  PCP

## 2023-06-08 NOTE — PROGRESS NOTES
[facility] [place]   at [address], [address] [facility], [address] [place] [address], [address] [facility]   [phone]             Progress Note          Patient: Amina Fischer  MRN: [id number]   SSN: [id number]    YOB: 1965                Reason for Visit:   Amina Fischer is a 60 y.o.  female who is seen by synchronous (real-time) audio-video technology for follow up of essential thrombocytosis. Subjective:         Amina Fischer is a 60 y.o.  female who I am seeing in follow up for thrombocytosis. She is admitted with  subacute difficulties with visual processing, cognitive impairment, slurred speech, dizziness, headaches x 2-3 weeks. MRI Brain this admission reveals restricted diffusion and enhancement of the R parieto-occipital and R frontal territories with rather  confluent T2 hyperintensity in a similar distribution on FLAIR. CTA H/N did not reveal any significant stenosis of LVO. She is taking Hydroxyurea and denies any side effects.           Review of Systems:      Constitutional: negative   Eyes: negative   Ears, Nose, Mouth, Throat, and Face: negative   Respiratory: negative   Cardiovascular: negative   Gastrointestinal: negative   Genitourinary:negative   Integument/Breast: negative   Hematologic/Lymphatic: negative   Musculoskeletal:negative   Neurological: negative          Past Medical History:   Diagnosis Date    Asthma     History of stroke without residual deficits     Morbid obesity with BMI of 45.0-49.9, adult ([address] 2016    MRSA carrier 2019    Osteoarthritis of multiple joints     Primary localized osteoarthritis of right hip 2017    Seizure disorder ([address]     last seizure 2006    Thrombocytosis        Past Surgical History:   Procedure Laterality Date    BREAST BIOPSY Left     NEG     SECTION      x 2    HEENT      LASIK    KNEE ARTHROSCOPY Bilateral     ORTHOPEDIC SURGERY Left     TKR

## 2023-09-26 ENCOUNTER — HOSPITAL ENCOUNTER (EMERGENCY)
Facility: HOSPITAL | Age: 58
Discharge: HOME | End: 2023-09-26
Attending: STUDENT IN AN ORGANIZED HEALTH CARE EDUCATION/TRAINING PROGRAM
Payer: MEDICARE

## 2023-09-26 ENCOUNTER — APPOINTMENT (OUTPATIENT)
Facility: HOSPITAL | Age: 58
End: 2023-09-26
Attending: STUDENT IN AN ORGANIZED HEALTH CARE EDUCATION/TRAINING PROGRAM
Payer: MEDICARE

## 2023-09-26 VITALS
DIASTOLIC BLOOD PRESSURE: 74 MMHG | SYSTOLIC BLOOD PRESSURE: 127 MMHG | RESPIRATION RATE: 18 BRPM | HEART RATE: 72 BPM | OXYGEN SATURATION: 99 % | TEMPERATURE: 98.7 F

## 2023-09-26 DIAGNOSIS — D64.9 LOW HEMOGLOBIN: Primary | ICD-10-CM

## 2023-09-26 DIAGNOSIS — J45.909 ACTIVE ASTHMA: ICD-10-CM

## 2023-09-26 DIAGNOSIS — R53.1 GENERALIZED WEAKNESS: ICD-10-CM

## 2023-09-26 LAB
ALBUMIN SERPL-MCNC: 4.2 G/DL (ref 3.5–5)
ALBUMIN/GLOB SERPL: 1.3 (ref 1.1–2.2)
ALP SERPL-CCNC: 106 U/L (ref 45–117)
ALT SERPL-CCNC: 20 U/L (ref 12–78)
ANION GAP SERPL CALC-SCNC: 8 MMOL/L (ref 5–15)
AST SERPL W P-5'-P-CCNC: 15 U/L (ref 15–37)
BASOPHILS # BLD: 0 K/UL (ref 0–0.1)
BASOPHILS NFR BLD: 0 % (ref 0–1)
BILIRUB SERPL-MCNC: 0.5 MG/DL (ref 0.2–1)
BUN SERPL-MCNC: 19 MG/DL (ref 6–20)
BUN/CREAT SERPL: 23 (ref 12–20)
CA-I BLD-MCNC: 9.1 MG/DL (ref 8.5–10.1)
CHLORIDE SERPL-SCNC: 104 MMOL/L (ref 97–108)
CO2 SERPL-SCNC: 30 MMOL/L (ref 21–32)
CREAT SERPL-MCNC: 0.81 MG/DL (ref 0.55–1.02)
DIFFERENTIAL METHOD BLD: ABNORMAL
EOSINOPHIL # BLD: 0.1 K/UL (ref 0–0.4)
EOSINOPHIL NFR BLD: 2 % (ref 0–7)
ERYTHROCYTE [DISTWIDTH] IN BLOOD BY AUTOMATED COUNT: 19.2 % (ref 11.5–14.5)
FLUAV RNA SPEC QL NAA+PROBE: NOT DETECTED
FLUBV RNA SPEC QL NAA+PROBE: NOT DETECTED
GLOBULIN SER CALC-MCNC: 3.2 G/DL (ref 2–4)
GLUCOSE SERPL-MCNC: 113 MG/DL (ref 65–100)
HCT VFR BLD AUTO: 27.2 % (ref 35–47)
HGB BLD-MCNC: 8.9 G/DL (ref 11.5–16)
IMM GRANULOCYTES # BLD AUTO: 0 K/UL
IMM GRANULOCYTES NFR BLD AUTO: 0 %
LYMPHOCYTES # BLD: 1.4 K/UL (ref 0.8–3.5)
LYMPHOCYTES NFR BLD: 50 % (ref 12–49)
MCH RBC QN AUTO: 34.8 PG (ref 26–34)
MCHC RBC AUTO-ENTMCNC: 32.7 G/DL (ref 30–36.5)
MCV RBC AUTO: 106.3 FL (ref 80–99)
MONOCYTES # BLD: 0.2 K/UL (ref 0–1)
MONOCYTES NFR BLD: 6 % (ref 5–13)
NEUTS BAND NFR BLD MANUAL: 4 % (ref 0–6)
NEUTS SEG # BLD: 1.3 K/UL (ref 1.8–8)
NEUTS SEG NFR BLD: 38 % (ref 32–75)
NRBC # BLD: 0.07 K/UL (ref 0–0.01)
NRBC BLD-RTO: 2.3 PER 100 WBC
PLATELET # BLD AUTO: 140 K/UL (ref 150–400)
PMV BLD AUTO: 11.2 FL (ref 8.9–12.9)
POTASSIUM SERPL-SCNC: 3.7 MMOL/L (ref 3.5–5.1)
PROT SERPL-MCNC: 7.4 G/DL (ref 6.4–8.2)
RBC # BLD AUTO: 2.56 M/UL (ref 3.8–5.2)
RBC MORPH BLD: ABNORMAL
SARS-COV-2 RNA RESP QL NAA+PROBE: NOT DETECTED
SODIUM SERPL-SCNC: 142 MMOL/L (ref 136–145)
TROPONIN I SERPL HS-MCNC: 6 NG/L (ref 0–51)
WBC # BLD AUTO: 3 K/UL (ref 3.6–11)

## 2023-09-26 PROCEDURE — 80053 COMPREHEN METABOLIC PANEL: CPT

## 2023-09-26 PROCEDURE — 2580000003 HC RX 258: Performed by: STUDENT IN AN ORGANIZED HEALTH CARE EDUCATION/TRAINING PROGRAM

## 2023-09-26 PROCEDURE — 99285 EMERGENCY DEPT VISIT HI MDM: CPT

## 2023-09-26 PROCEDURE — 71046 X-RAY EXAM CHEST 2 VIEWS: CPT

## 2023-09-26 PROCEDURE — 84484 ASSAY OF TROPONIN QUANT: CPT

## 2023-09-26 PROCEDURE — 93005 ELECTROCARDIOGRAM TRACING: CPT | Performed by: STUDENT IN AN ORGANIZED HEALTH CARE EDUCATION/TRAINING PROGRAM

## 2023-09-26 PROCEDURE — 94640 AIRWAY INHALATION TREATMENT: CPT

## 2023-09-26 PROCEDURE — 6370000000 HC RX 637 (ALT 250 FOR IP): Performed by: STUDENT IN AN ORGANIZED HEALTH CARE EDUCATION/TRAINING PROGRAM

## 2023-09-26 PROCEDURE — 85025 COMPLETE CBC W/AUTO DIFF WBC: CPT

## 2023-09-26 PROCEDURE — 6360000002 HC RX W HCPCS: Performed by: STUDENT IN AN ORGANIZED HEALTH CARE EDUCATION/TRAINING PROGRAM

## 2023-09-26 PROCEDURE — 96374 THER/PROPH/DIAG INJ IV PUSH: CPT

## 2023-09-26 PROCEDURE — 87636 SARSCOV2 & INF A&B AMP PRB: CPT

## 2023-09-26 RX ORDER — IPRATROPIUM BROMIDE AND ALBUTEROL SULFATE 2.5; .5 MG/3ML; MG/3ML
1 SOLUTION RESPIRATORY (INHALATION)
Status: COMPLETED | OUTPATIENT
Start: 2023-09-26 | End: 2023-09-26

## 2023-09-26 RX ADMIN — IPRATROPIUM BROMIDE AND ALBUTEROL SULFATE 1 DOSE: .5; 3 SOLUTION RESPIRATORY (INHALATION) at 13:09

## 2023-09-26 RX ADMIN — WATER 125 MG: 1 INJECTION INTRAMUSCULAR; INTRAVENOUS; SUBCUTANEOUS at 12:37

## 2023-09-26 ASSESSMENT — LIFESTYLE VARIABLES
HOW OFTEN DO YOU HAVE A DRINK CONTAINING ALCOHOL: MONTHLY OR LESS
HOW MANY STANDARD DRINKS CONTAINING ALCOHOL DO YOU HAVE ON A TYPICAL DAY: 1 OR 2

## 2023-09-26 ASSESSMENT — PAIN - FUNCTIONAL ASSESSMENT: PAIN_FUNCTIONAL_ASSESSMENT: NONE - DENIES PAIN

## 2023-09-26 NOTE — DISCHARGE INSTRUCTIONS
Thank you! Thank you for allowing me to care for you in the emergency department. It is my goal to provide you with excellent care. If you have not received excellent quality care, please ask to speak to the nurse manager. Please fill out the survey that will come to you by mail or email since we listen to your feedback! Below you will find a list of your tests from today's visit. Should you have any questions, please do not hesitate to call the emergency department.     Labs  Recent Results (from the past 12 hour(s))   EKG 12 Lead    Collection Time: 09/26/23 11:42 AM   Result Value Ref Range    Ventricular Rate 70 BPM    Atrial Rate 71 BPM    P-R Interval 175 ms    QRS Duration 96 ms    Q-T Interval 391 ms    QTc Calculation (Bazett) 422 ms    P Axis 53 degrees    R Axis 31 degrees    T Axis -2 degrees    Diagnosis Sinus rhythm  Inferior infarct, old      CBC with Auto Differential    Collection Time: 09/26/23 12:05 PM   Result Value Ref Range    WBC 3.0 (L) 3.6 - 11.0 K/uL    RBC 2.56 (L) 3.80 - 5.20 M/uL    Hemoglobin 8.9 (L) 11.5 - 16.0 g/dL    Hematocrit 27.2 (L) 35.0 - 47.0 %    .3 (H) 80.0 - 99.0 FL    MCH 34.8 (H) 26.0 - 34.0 PG    MCHC 32.7 30.0 - 36.5 g/dL    RDW 19.2 (H) 11.5 - 14.5 %    Platelets 498 (L) 839 - 400 K/uL    MPV 11.2 8.9 - 12.9 FL    Nucleated RBCs 2.3 (H) 0.0  WBC    nRBC 0.07 (H) 0.00 - 0.01 K/uL    Neutrophils % 38 32 - 75 %    Band Neutrophils 4 0 - 6 %    Lymphocytes % 50 (H) 12 - 49 %    Monocytes % 6 5 - 13 %    Eosinophils % 2 0 - 7 %    Basophils % 0 0 - 1 %    Immature Granulocytes 0 %    Neutrophils Absolute 1.3 (L) 1.8 - 8.0 K/UL    Lymphocytes Absolute 1.4 0.8 - 3.5 K/UL    Monocytes Absolute 0.2 0.0 - 1.0 K/UL    Eosinophils Absolute 0.1 0.0 - 0.4 K/UL    Basophils Absolute 0.0 0.0 - 0.1 K/UL    Absolute Immature Granulocyte 0.0 K/UL    Differential Type Smear Scanned      RBC Comment Normocytic, Normochromic     Comprehensive Metabolic Panel

## 2023-09-26 NOTE — ED PROVIDER NOTES
CRITICAL CARE TIME   Patient does not meet Critical Care Time, 0 minutes    FINAL IMPRESSION   No diagnosis found. DISPOSITION/PLAN   DISPOSITION      Discharge Note: The patient is stable for discharge home. The signs, symptoms, diagnosis, and discharge instructions have been discussed, understanding conveyed, and agreed upon. The patient is to follow up as recommended or return to ER should their symptoms worsen. PATIENT REFERRED TO:  No follow-up provider specified. DISCHARGE MEDICATIONS:     Medication List        ASK your doctor about these medications      albuterol sulfate  (90 Base) MCG/ACT inhaler  Commonly known as: PROVENTIL;VENTOLIN;PROAIR     amLODIPine 10 MG tablet  Commonly known as: NORVASC     aspirin 325 MG tablet     budesonide-formoterol 160-4.5 MCG/ACT Aero  Commonly known as: SYMBICORT     * hydroxyurea 500 MG chemo capsule  Commonly known as: HYDREA     * hydroxyurea 500 MG chemo capsule  Commonly known as: Hydrea  Take 2 caps by mouth on M,W,FRI,SUN and 3 caps on T,TH,SAT     LORazepam 0.5 MG tablet  Commonly known as: ATIVAN     * phenytoin 100 MG ER capsule  Commonly known as: DILANTIN     * phenytoin 30 MG ER capsule  Commonly known as: DILANTIN     rosuvastatin 10 MG tablet  Commonly known as: CRESTOR           * This list has 4 medication(s) that are the same as other medications prescribed for you. Read the directions carefully, and ask your doctor or other care provider to review them with you. DISCONTINUED MEDICATIONS:  Current Discharge Medication List          I am the Primary Clinician of Record: Alberta Bruce MD (electronically signed)    (Please note that parts of this dictation were completed with voice recognition software. Quite often unanticipated grammatical, syntax, homophones, and other interpretive errors are inadvertently transcribed by the computer software. Please disregards these errors.  Please excuse any errors that

## 2023-09-26 NOTE — ED TRIAGE NOTES
C/o dyspnea for 2 days  Internittent wheezing- has been using inhaler- had a nebulizer last night.   Recently traveled to [place]

## 2023-09-27 ENCOUNTER — TRANSCRIBE ORDERS (OUTPATIENT)
Facility: HOSPITAL | Age: 58
End: 2023-09-27

## 2023-09-27 DIAGNOSIS — Z12.31 VISIT FOR SCREENING MAMMOGRAM: Primary | ICD-10-CM

## 2023-09-28 LAB
EKG ATRIAL RATE: 71 BPM
EKG DIAGNOSIS: NORMAL
EKG P AXIS: 53 DEGREES
EKG P-R INTERVAL: 175 MS
EKG Q-T INTERVAL: 391 MS
EKG QRS DURATION: 96 MS
EKG QTC CALCULATION (BAZETT): 422 MS
EKG R AXIS: 31 DEGREES
EKG T AXIS: -2 DEGREES
EKG VENTRICULAR RATE: 70 BPM

## 2023-10-03 ENCOUNTER — HOSPITAL ENCOUNTER (OUTPATIENT)
Facility: HOSPITAL | Age: 58
Discharge: HOME | End: 2023-10-06
Payer: MEDICARE

## 2023-10-03 VITALS — HEIGHT: 68 IN | BODY MASS INDEX: 44.41 KG/M2 | WEIGHT: 293 LBS

## 2023-10-03 DIAGNOSIS — Z12.31 VISIT FOR SCREENING MAMMOGRAM: ICD-10-CM

## 2023-10-03 PROCEDURE — 77063 BREAST TOMOSYNTHESIS BI: CPT

## 2023-10-09 ENCOUNTER — TELEPHONE (OUTPATIENT)
Age: 58
End: 2023-10-09

## 2023-10-09 NOTE — TELEPHONE ENCOUNTER
Pt called in stating shes was in the ER recently for an asthma attack. While there they noticed with the labs they were up down, up down, up down.  Pt wanted to know if these levels were normal as she has no energy and is concerned for another stroke    CB# [phone]

## 2023-10-10 DIAGNOSIS — D75.839 THROMBOCYTOSIS, UNSPECIFIED: Primary | ICD-10-CM

## 2023-10-10 NOTE — PROGRESS NOTES
Cole Aguilar at [facility]  [phone]    10/10/23 12:10 PM EDT -  Please call patient to let her know that Dr. Desmond Verduzco and I have reviewed her labs. Her current hydrea regimen is causing her low blood levels. Dr. Desmond Verduzco would like her to start taking 1000mg daily of Hydrea. This will likely help elevate some of her blood levels and help increase her energy level. Please make sure she understands new hydrea dosages along with continuing daily Aspirin. We will recheck her CBC in 2 months (approx 2-3 days prior to her MD visit with Dr. Desmond Verduzco on 12/8). I will enter so that she can get the lab obtained at [facility] lab.    Thanks,   ZORAN De Jesus

## 2023-10-11 ENCOUNTER — TELEPHONE (OUTPATIENT)
Age: 58
End: 2023-10-11

## 2023-10-11 NOTE — PROGRESS NOTES
Pt returned call. Veda Fears HIPAA verified by two patient identifiers. Relayed this information to her. She is wondering if  knows about her history. She started taking naproxen in summer time due to muscle aches/pains and this is when things started to happen. She said she stopped taking the naproxen about 4 weeks ago. When she went to her PCP 2 weeks ago she was told to only take tylenol arthritis. She is still taking aspirin 325mg. She isn't sure what to do and is so worried about having a stroke. Pt was thinking PCP was going to contact   She also had her stool tested by PCPand was positive for blood so changed her diet etc..pt has no energy.

## 2023-10-11 NOTE — TELEPHONE ENCOUNTER
Patient is returning Tiffanie's call. Message left on our vm 10/09/2023 @04:14pm regarding her levels.

## 2023-10-12 NOTE — PROGRESS NOTES
Since pt has several concerns,  Please ask pt to come in next wed at 2pm for office visit.  We prefer her to come in vs a virtual.

## 2023-10-17 ENCOUNTER — TELEPHONE (OUTPATIENT)
Age: 58
End: 2023-10-17

## 2023-10-17 ENCOUNTER — HOSPITAL ENCOUNTER (OUTPATIENT)
Facility: HOSPITAL | Age: 58
Discharge: HOME | End: 2023-10-20
Payer: MEDICARE

## 2023-10-17 DIAGNOSIS — R06.02 SHORTNESS OF BREATH: ICD-10-CM

## 2023-10-17 DIAGNOSIS — R53.83 OTHER FATIGUE: ICD-10-CM

## 2023-10-17 DIAGNOSIS — D64.9 ANEMIA, UNSPECIFIED TYPE: ICD-10-CM

## 2023-10-17 LAB
BASOPHILS # BLD: 0 K/UL (ref 0–0.1)
BASOPHILS NFR BLD: 1 % (ref 0–1)
DIFFERENTIAL METHOD BLD: ABNORMAL
EOSINOPHIL # BLD: 0.1 K/UL (ref 0–0.4)
EOSINOPHIL NFR BLD: 2 % (ref 0–7)
ERYTHROCYTE [DISTWIDTH] IN BLOOD BY AUTOMATED COUNT: 20.6 % (ref 11.5–14.5)
HCT VFR BLD AUTO: 19.6 % (ref 35–47)
HGB BLD-MCNC: 6.5 G/DL (ref 11.5–16)
IMM GRANULOCYTES # BLD AUTO: 0.2 K/UL (ref 0–0.04)
IMM GRANULOCYTES NFR BLD AUTO: 6 % (ref 0–0.5)
LYMPHOCYTES # BLD: 0.7 K/UL (ref 0.8–3.5)
LYMPHOCYTES NFR BLD: 17 % (ref 12–49)
MCH RBC QN AUTO: 33.9 PG (ref 26–34)
MCHC RBC AUTO-ENTMCNC: 33.2 G/DL (ref 30–36.5)
MCV RBC AUTO: 102.1 FL (ref 80–99)
MONOCYTES # BLD: 0.9 K/UL (ref 0–1)
MONOCYTES NFR BLD: 24 % (ref 5–13)
NEUTS SEG # BLD: 2 K/UL (ref 1.8–8)
NEUTS SEG NFR BLD: 50 % (ref 32–75)
NRBC # BLD: 0.43 K/UL (ref 0–0.01)
NRBC BLD-RTO: 10.9 PER 100 WBC
PLATELET # BLD AUTO: 54 K/UL (ref 150–400)
PMV BLD AUTO: 10.5 FL (ref 8.9–12.9)
RBC # BLD AUTO: 1.92 M/UL (ref 3.8–5.2)
RBC MORPH BLD: ABNORMAL
WBC # BLD AUTO: 3.9 K/UL (ref 3.6–11)

## 2023-10-17 PROCEDURE — 85025 COMPLETE CBC W/AUTO DIFF WBC: CPT

## 2023-10-17 PROCEDURE — 86901 BLOOD TYPING SEROLOGIC RH(D): CPT

## 2023-10-17 PROCEDURE — 86923 COMPATIBILITY TEST ELECTRIC: CPT

## 2023-10-17 PROCEDURE — 36415 COLL VENOUS BLD VENIPUNCTURE: CPT

## 2023-10-17 PROCEDURE — 86850 RBC ANTIBODY SCREEN: CPT

## 2023-10-17 PROCEDURE — 86900 BLOOD TYPING SEROLOGIC ABO: CPT

## 2023-10-17 NOTE — TELEPHONE ENCOUNTER
Please call Svetlana dempsey/the patient's pcp office is wanting to speak with you regarding this patient. She wants to give you some background on her. She is canceling tomorrow's appt.

## 2023-10-17 NOTE — PROGRESS NOTES
Milka Pardo is a 60 y.o. female here for follow up for thrombocytosis. She is on Hydrea. She wants to discuss recent lab work and urinalysis. Stool sample was negative. Did Cologuard and was fine. She has no energy and  is hard to function. She has received blood infusions lately. 2 units yesterday. 1. Have you been to the ER, urgent care clinic since your last visit? Hospitalized since your last visit? 9/26/23  Asthma attack    2. Have you seen or consulted any other health care providers outside of the [address] [place] [address] Avenue since your last visit? Include any pap smears or colon screening.   no

## 2023-10-18 ENCOUNTER — HOSPITAL ENCOUNTER (OUTPATIENT)
Facility: HOSPITAL | Age: 58
Setting detail: INFUSION SERIES
End: 2023-10-18
Payer: MEDICARE

## 2023-10-18 VITALS
TEMPERATURE: 98.8 F | OXYGEN SATURATION: 99 % | HEART RATE: 83 BPM | DIASTOLIC BLOOD PRESSURE: 73 MMHG | RESPIRATION RATE: 20 BRPM | SYSTOLIC BLOOD PRESSURE: 127 MMHG

## 2023-10-18 LAB
ERYTHROCYTE [DISTWIDTH] IN BLOOD BY AUTOMATED COUNT: 23.1 % (ref 11.5–14.5)
HCT VFR BLD AUTO: 19.9 % (ref 36–46)
HCT VFR BLD AUTO: 20.3 % (ref 36–46)
HGB BLD-MCNC: 6.9 G/DL (ref 13.5–17.5)
HGB BLD-MCNC: 6.9 G/DL (ref 13.5–17.5)
MCH RBC QN AUTO: 33.6 PG (ref 31–34)
MCHC RBC AUTO-ENTMCNC: 34.6 G/DL (ref 31–36)
MCV RBC AUTO: 97 FL (ref 80–100)
NRBC # BLD: 0.1 K/UL
NRBC BLD-RTO: 2.5 PER 100 WBC
PLATELET # BLD AUTO: 60 K/UL (ref 150–400)
PMV BLD AUTO: 8.4 FL (ref 6.5–11.5)
RBC # BLD AUTO: 2.05 M/UL (ref 4.5–5.9)
WBC # BLD AUTO: 3.9 K/UL (ref 4.4–11.3)

## 2023-10-18 PROCEDURE — 85018 HEMOGLOBIN: CPT

## 2023-10-18 PROCEDURE — 85014 HEMATOCRIT: CPT

## 2023-10-18 PROCEDURE — 85027 COMPLETE CBC AUTOMATED: CPT

## 2023-10-18 PROCEDURE — 36430 TRANSFUSION BLD/BLD COMPNT: CPT

## 2023-10-18 PROCEDURE — P9016 RBC LEUKOCYTES REDUCED: HCPCS

## 2023-10-18 PROCEDURE — 2580000003 HC RX 258: Performed by: PHYSICIAN ASSISTANT

## 2023-10-18 PROCEDURE — 36415 COLL VENOUS BLD VENIPUNCTURE: CPT

## 2023-10-18 RX ORDER — SODIUM CHLORIDE 9 MG/ML
INJECTION, SOLUTION INTRAVENOUS PRN
Status: DISCONTINUED | OUTPATIENT
Start: 2023-10-18 | End: 2023-10-26 | Stop reason: HOSPADM

## 2023-10-18 RX ORDER — SODIUM CHLORIDE 9 MG/ML
INJECTION, SOLUTION INTRAVENOUS PRN
Status: COMPLETED | OUTPATIENT
Start: 2023-10-18 | End: 2023-10-18

## 2023-10-18 RX ADMIN — SODIUM CHLORIDE: 9 INJECTION, SOLUTION INTRAVENOUS at 09:01

## 2023-10-18 ASSESSMENT — PAIN SCALES - GENERAL: PAINLEVEL_OUTOF10: 0

## 2023-10-18 NOTE — PROGRESS NOTES
2nd unit of Packed RBC's started to infuse as ordered for hemoglobin 6.9 Assisted up to restroom to void, tolerated well. Dyspnea on exertion noted, denies pain, reports fatigue.  No reactions noted to transfusion

## 2023-10-18 NOTE — PROGRESS NOTES
Received to Outpatient # 6 for Blood transfusion as ordered. Verbalizes understanding of need for transfusion secondary to weakness, fatigue and dyspnea with exertion.

## 2023-10-18 NOTE — PROGRESS NOTES
Consent form signed for blood transfusion, Made comfortable on stretcher, call light in reach. Snacks offered, tolerating pepsi and crackers well.

## 2023-10-18 NOTE — PROGRESS NOTES
Discharged home via wheelchair with  and all belongings taken, no transfusion reactions noted, no distress, States \" feel much better now than when I came in\" Verbalizes understanding of all discharge instructions and follow-up as ordered.

## 2023-10-18 NOTE — PROGRESS NOTES
1st unit of MS BC's completed without reactions noted. No distress noted, no complaints noted. Lab notified of 1 hour post Hemoglobin and hematocrit due at 1245 today. Normal saline infusing at 20 ml/hr to flush line.

## 2023-10-18 NOTE — PROGRESS NOTES
2nd unit of P RBC's completed without reactions noted, Normal saline started to flush Iv line. Respirations even and unlabored, no distress, no reactions noted, vital signs stable. See flow sheets, call bell in reach, resting quietly on stretcher.

## 2023-10-18 NOTE — PROGRESS NOTES
Up to restroom to void, states \" I don't feel as weak and fatigued as I did earlier\". Dyspnea on exert has decreased from previously also.  Respirations even and unlabored

## 2023-10-18 NOTE — PROGRESS NOTES
Shagufta WEBB notified of 1 hour post transfusion Hemoglobin 6.9 and Hematocrit 20.3 orders to transfuse 2nd unit Packed Red Blood cells.

## 2023-10-18 NOTE — PROGRESS NOTES
Blood continues to infuse without reactions, respirations even and unlabored, no distress noted, tolerating po intake well.

## 2023-10-18 NOTE — PROGRESS NOTES
Verbalizes understanding of discharge instructions and follow-up care. Previously Scheduled to see hematologist tomorrow. No transfusion reactions noted, no distress noted. Instructed to call or return to hospital if any adverse reactions noted.

## 2023-10-18 NOTE — PROGRESS NOTES
Packed Red Blood cells started through IV line right antecubital, no redness, edema or signs or symptoms of any transfusion reactions noted.

## 2023-10-19 ENCOUNTER — OFFICE VISIT (OUTPATIENT)
Age: 58
End: 2023-10-19
Payer: MEDICARE

## 2023-10-19 ENCOUNTER — TELEPHONE (OUTPATIENT)
Age: 58
End: 2023-10-19

## 2023-10-19 VITALS
HEART RATE: 81 BPM | DIASTOLIC BLOOD PRESSURE: 72 MMHG | WEIGHT: 293 LBS | OXYGEN SATURATION: 98 % | TEMPERATURE: 98.2 F | HEIGHT: 68 IN | BODY MASS INDEX: 44.41 KG/M2 | SYSTOLIC BLOOD PRESSURE: 114 MMHG

## 2023-10-19 DIAGNOSIS — D47.3 ESSENTIAL THROMBOCYTOSIS (HCC): ICD-10-CM

## 2023-10-19 DIAGNOSIS — D61.818 PANCYTOPENIA (HCC): Primary | ICD-10-CM

## 2023-10-19 LAB
ABO + RH BLD: NORMAL
BLD PROD TYP BPU: NORMAL
BLD PROD TYP BPU: NORMAL
BLOOD BANK DISPENSE STATUS: NORMAL
BLOOD BANK DISPENSE STATUS: NORMAL
BLOOD GROUP ANTIBODIES SERPL: NEGATIVE
BPU ID: NORMAL
BPU ID: NORMAL
CROSSMATCH RESULT: NORMAL
CROSSMATCH RESULT: NORMAL
SPECIMEN EXP DATE BLD: NORMAL
TRANSFUSION STATUS PATIENT QL: NORMAL
TRANSFUSION STATUS PATIENT QL: NORMAL
UNIT DIVISION: 0
UNIT DIVISION: 0

## 2023-10-19 PROCEDURE — 99214 OFFICE O/P EST MOD 30 MIN: CPT | Performed by: INTERNAL MEDICINE

## 2023-10-19 NOTE — PROGRESS NOTES
[facility]   at [address] [place], [address], [facility], [address] [place] [address] Ave   [phone]             Progress Note          Patient: [facility]  MRN: [id number]   SSN: [id number]    YOB: 1965             Subjective:        [facility] is a 60 y.o.  female who I am seeing in follow up for thrombocytosis. She is admitted with  subacute difficulties with visual processing, cognitive impairment, slurred speech, dizziness, headaches x 2-3 weeks. MRI Brain this admission reveals restricted diffusion and enhancement of the R parieto-occipital and R frontal territories with rather  confluent T2 hyperintensity in a similar distribution on FLAIR. CTA H/N did not reveal any significant stenosis of LVO. She has been taking Hydroxyurea. She developed pancytopenia from Hydrea. She has been feeling poorly with no energy. She got RBC transfusion yesterday.           Review of Systems:      Constitutional: excessive fatigue   Eyes: negative   Ears, Nose, Mouth, Throat, and Face: negative   Respiratory: negative   Cardiovascular: negative   Gastrointestinal: negative   Genitourinary:negative   Integument/Breast: negative   Hematologic/Lymphatic: negative   Musculoskeletal:negative   Neurological: negative          Past Medical History:   Diagnosis Date    Asthma     Cerebral artery occlusion with cerebral infarction ([address])     History of stroke without residual deficits     Morbid obesity with BMI of 45.0-49.9, adult ([address]) 2016    MRSA carrier 2019    Osteoarthritis of multiple joints     Primary localized osteoarthritis of right hip 2017    Seizure disorder ([address])     last seizure 2006    Thrombocytosis        Past Surgical History:   Procedure Laterality Date    BREAST BIOPSY Left     NEG     SECTION      x 2    HEENT      LASIK    JOINT REPLACEMENT      KNEE ARTHROSCOPY Bilateral     ORTHOPEDIC SURGERY Left

## 2023-10-19 NOTE — TELEPHONE ENCOUNTER
Pt forgot to ask while at office. Pts PCP told her to stop taking aspirin.  Pt wanted to confirm that's its ok    # [phone]

## 2023-10-20 ENCOUNTER — HOSPITAL ENCOUNTER (EMERGENCY)
Facility: HOSPITAL | Age: 58
Discharge: HOME | End: 2023-10-20
Attending: EMERGENCY MEDICINE
Payer: MEDICARE

## 2023-10-20 ENCOUNTER — APPOINTMENT (OUTPATIENT)
Facility: HOSPITAL | Age: 58
End: 2023-10-20
Payer: MEDICARE

## 2023-10-20 ENCOUNTER — CLINICAL DOCUMENTATION (OUTPATIENT)
Age: 58
End: 2023-10-20

## 2023-10-20 ENCOUNTER — TELEPHONE (OUTPATIENT)
Age: 58
End: 2023-10-20

## 2023-10-20 VITALS
TEMPERATURE: 98.1 F | DIASTOLIC BLOOD PRESSURE: 81 MMHG | RESPIRATION RATE: 18 BRPM | SYSTOLIC BLOOD PRESSURE: 118 MMHG | WEIGHT: 293 LBS | HEIGHT: 68 IN | HEART RATE: 90 BPM | OXYGEN SATURATION: 98 % | BODY MASS INDEX: 44.41 KG/M2

## 2023-10-20 DIAGNOSIS — R16.1 PAIN DUE TO SPLENOMEGALY: ICD-10-CM

## 2023-10-20 DIAGNOSIS — E86.0 DEHYDRATION: ICD-10-CM

## 2023-10-20 DIAGNOSIS — R10.12 LEFT UPPER QUADRANT ABDOMINAL PAIN: Primary | ICD-10-CM

## 2023-10-20 DIAGNOSIS — R10.30 LOWER ABDOMINAL PAIN: Primary | ICD-10-CM

## 2023-10-20 LAB
ALBUMIN SERPL-MCNC: 3.4 G/DL (ref 3.5–5)
ALBUMIN/GLOB SERPL: 0.9 (ref 1.1–2.2)
ALP SERPL-CCNC: 122 U/L (ref 45–117)
ALT SERPL-CCNC: 61 U/L (ref 12–78)
ANION GAP SERPL CALC-SCNC: 9 MMOL/L (ref 5–15)
AST SERPL W P-5'-P-CCNC: 62 U/L (ref 15–37)
BASOPHILS # BLD: 0 K/UL (ref 0–0.1)
BASOPHILS NFR BLD: 0 % (ref 0–1)
BILIRUB SERPL-MCNC: 0.9 MG/DL (ref 0.2–1)
BUN SERPL-MCNC: 21 MG/DL (ref 6–20)
BUN/CREAT SERPL: 24 (ref 12–20)
CA-I BLD-MCNC: 9.1 MG/DL (ref 8.5–10.1)
CHLORIDE SERPL-SCNC: 101 MMOL/L (ref 97–108)
CO2 SERPL-SCNC: 27 MMOL/L (ref 21–32)
CREAT SERPL-MCNC: 0.89 MG/DL (ref 0.55–1.02)
DIFFERENTIAL METHOD BLD: ABNORMAL
EOSINOPHIL # BLD: 0.1 K/UL (ref 0–0.4)
EOSINOPHIL NFR BLD: 2 % (ref 0–7)
ERYTHROCYTE [DISTWIDTH] IN BLOOD BY AUTOMATED COUNT: 20.6 % (ref 11.5–14.5)
GLOBULIN SER CALC-MCNC: 3.7 G/DL (ref 2–4)
GLUCOSE SERPL-MCNC: 122 MG/DL (ref 65–100)
HCT VFR BLD AUTO: 24 % (ref 35–47)
HGB BLD-MCNC: 8.2 G/DL (ref 11.5–16)
IMM GRANULOCYTES # BLD AUTO: 0.4 K/UL (ref 0–0.04)
IMM GRANULOCYTES NFR BLD AUTO: 9 % (ref 0–0.5)
LIPASE SERPL-CCNC: 84 U/L (ref 13–75)
LYMPHOCYTES # BLD: 0.9 K/UL (ref 0.8–3.5)
LYMPHOCYTES NFR BLD: 22 % (ref 12–49)
MCH RBC QN AUTO: 33.1 PG (ref 26–34)
MCHC RBC AUTO-ENTMCNC: 34.2 G/DL (ref 30–36.5)
MCV RBC AUTO: 96.8 FL (ref 80–99)
MONOCYTES # BLD: 0.9 K/UL (ref 0–1)
MONOCYTES NFR BLD: 23 % (ref 5–13)
NEUTS SEG # BLD: 1.9 K/UL (ref 1.8–8)
NEUTS SEG NFR BLD: 43 % (ref 32–75)
NRBC # BLD: 0.37 K/UL (ref 0–0.01)
NRBC BLD-RTO: 8.5 PER 100 WBC
PLATELET # BLD AUTO: 56 K/UL (ref 150–400)
PMV BLD AUTO: ABNORMAL FL (ref 8.9–12.9)
POTASSIUM SERPL-SCNC: 4.1 MMOL/L (ref 3.5–5.1)
PROT SERPL-MCNC: 7.1 G/DL (ref 6.4–8.2)
RBC # BLD AUTO: 2.48 M/UL (ref 3.8–5.2)
RBC MORPH BLD: ABNORMAL
SODIUM SERPL-SCNC: 137 MMOL/L (ref 136–145)
TROPONIN I SERPL HS-MCNC: 7 NG/L (ref 0–51)
WBC # BLD AUTO: 4.3 K/UL (ref 3.6–11)

## 2023-10-20 PROCEDURE — 96375 TX/PRO/DX INJ NEW DRUG ADDON: CPT

## 2023-10-20 PROCEDURE — 85025 COMPLETE CBC W/AUTO DIFF WBC: CPT

## 2023-10-20 PROCEDURE — 2580000003 HC RX 258: Performed by: EMERGENCY MEDICINE

## 2023-10-20 PROCEDURE — 6360000004 HC RX CONTRAST MEDICATION: Performed by: EMERGENCY MEDICINE

## 2023-10-20 PROCEDURE — 74177 CT ABD & PELVIS W/CONTRAST: CPT

## 2023-10-20 PROCEDURE — 84484 ASSAY OF TROPONIN QUANT: CPT

## 2023-10-20 PROCEDURE — 6360000002 HC RX W HCPCS: Performed by: EMERGENCY MEDICINE

## 2023-10-20 PROCEDURE — 96374 THER/PROPH/DIAG INJ IV PUSH: CPT

## 2023-10-20 PROCEDURE — 99285 EMERGENCY DEPT VISIT HI MDM: CPT

## 2023-10-20 PROCEDURE — 71045 X-RAY EXAM CHEST 1 VIEW: CPT

## 2023-10-20 PROCEDURE — 80053 COMPREHEN METABOLIC PANEL: CPT

## 2023-10-20 PROCEDURE — 83690 ASSAY OF LIPASE: CPT

## 2023-10-20 PROCEDURE — 36415 COLL VENOUS BLD VENIPUNCTURE: CPT

## 2023-10-20 RX ORDER — 0.9 % SODIUM CHLORIDE 0.9 %
1000 INTRAVENOUS SOLUTION INTRAVENOUS
Status: COMPLETED | OUTPATIENT
Start: 2023-10-20 | End: 2023-10-20

## 2023-10-20 RX ORDER — ONDANSETRON 2 MG/ML
4 INJECTION INTRAMUSCULAR; INTRAVENOUS
Status: COMPLETED | OUTPATIENT
Start: 2023-10-20 | End: 2023-10-20

## 2023-10-20 RX ORDER — FENTANYL CITRATE 50 UG/ML
50 INJECTION, SOLUTION INTRAMUSCULAR; INTRAVENOUS
Status: COMPLETED | OUTPATIENT
Start: 2023-10-20 | End: 2023-10-20

## 2023-10-20 RX ORDER — OXYCODONE HYDROCHLORIDE 5 MG/1
5 TABLET ORAL EVERY 6 HOURS PRN
Qty: 120 TABLET | Refills: 0 | Status: SHIPPED | OUTPATIENT
Start: 2023-10-20 | End: 2023-11-19

## 2023-10-20 RX ADMIN — FENTANYL CITRATE 50 MCG: 50 INJECTION, SOLUTION INTRAMUSCULAR; INTRAVENOUS at 03:22

## 2023-10-20 RX ADMIN — ONDANSETRON 4 MG: 2 INJECTION INTRAMUSCULAR; INTRAVENOUS at 03:22

## 2023-10-20 RX ADMIN — SODIUM CHLORIDE 1000 ML: 9 INJECTION, SOLUTION INTRAVENOUS at 03:22

## 2023-10-20 RX ADMIN — IOPAMIDOL 100 ML: 755 INJECTION, SOLUTION INTRAVENOUS at 03:46

## 2023-10-20 ASSESSMENT — PATIENT HEALTH QUESTIONNAIRE - PHQ9
SUM OF ALL RESPONSES TO PHQ9 QUESTIONS 1 & 2: 0
2. FEELING DOWN, DEPRESSED OR HOPELESS: 0
SUM OF ALL RESPONSES TO PHQ QUESTIONS 1-9: 0
1. LITTLE INTEREST OR PLEASURE IN DOING THINGS: 0

## 2023-10-20 ASSESSMENT — ENCOUNTER SYMPTOMS
EYES NEGATIVE: 1
ALLERGIC/IMMUNOLOGIC NEGATIVE: 1
ABDOMINAL PAIN: 1
RESPIRATORY NEGATIVE: 1

## 2023-10-20 ASSESSMENT — PAIN DESCRIPTION - LOCATION
LOCATION: ABDOMEN
LOCATION: ABDOMEN

## 2023-10-20 ASSESSMENT — PAIN SCALES - GENERAL
PAINLEVEL_OUTOF10: 10
PAINLEVEL_OUTOF10: 0
PAINLEVEL_OUTOF10: 10

## 2023-10-20 ASSESSMENT — PAIN - FUNCTIONAL ASSESSMENT
PAIN_FUNCTIONAL_ASSESSMENT: 0-10
PAIN_FUNCTIONAL_ASSESSMENT: 0-10

## 2023-10-20 ASSESSMENT — LIFESTYLE VARIABLES
HOW MANY STANDARD DRINKS CONTAINING ALCOHOL DO YOU HAVE ON A TYPICAL DAY: PATIENT DOES NOT DRINK
HOW OFTEN DO YOU HAVE A DRINK CONTAINING ALCOHOL: NEVER

## 2023-10-20 ASSESSMENT — PAIN DESCRIPTION - ORIENTATION: ORIENTATION: LEFT;UPPER

## 2023-10-20 NOTE — TELEPHONE ENCOUNTER
Patient is asking for Dr BAKER to please call her , States the ER suggested her not to leave. She is concerned she did because she thought she would of spoken with Dr Jose Luis Landrum. She is asking also what to do in case she has another painful episode. Should she take tylenol. She is asking for someone to please all her today.

## 2023-10-20 NOTE — TELEPHONE ENCOUNTER
Patient called was in the ER last night. Notes in epic.  Asking for a follow with Dr. Tremayne Simon. Current appt is for Jan.

## 2023-10-20 NOTE — DISCHARGE INSTRUCTIONS
[place] fluids. Tylenol as directed for pain. Follow-up with Dr. Jay Jay Catherine(followed hematologist)her in AM or return to ED immediately.

## 2023-10-20 NOTE — ED TRIAGE NOTES
Patient presents with complaint of LUQ abdominal pain for several hours. Patient refused urinalysis in triage, stating she just had it done and currently has a UTI. On Cipro 500 mg PO BID which she started this afternoon. PMH of anemia and recently received blood transfusions.

## 2023-10-20 NOTE — ED NOTES
Discharged home to self. Ambulatory out of ED. VS WDL.  0 s/s acute distress. Respirations even and unlabored. Discharge instructions and follow up care reviewed. Patient receptive and demonstrated knowledge of instruction via teach-back method.         Elias Lacey RN  10/20/23 1056

## 2023-10-20 NOTE — TELEPHONE ENCOUNTER
Spoke to the patient. Splenomegaly suggests a possible Dx of P. Vera or Myelofibrosis. I plan to obtain a BM Bx in the coming weeks and also I prescribed her Oxycodone PRN.

## 2023-10-20 NOTE — ED PROVIDER NOTES
either signs or Co-signs this chart in the absence of a cardiologist.        RADIOLOGY:   Non-plain film images such as CT, Ultrasound and MRI are read by the radiologist. Plain radiographic images are visualized and preliminarily interpreted by the emergency physician with the below findings:        Interpretation per the Radiologist below, if available at the time of this note:    No orders to display         ED BEDSIDE ULTRASOUND:   Performed by ED Physician - none    LABS:  Labs Reviewed   CBC WITH AUTO DIFFERENTIAL   COMPREHENSIVE METABOLIC PANEL   LIPASE   URINALYSIS       All other labs were within normal range or not returned as of this dictation. EMERGENCY DEPARTMENT COURSE and DIFFERENTIAL DIAGNOSIS/MDM:   Vitals: There were no vitals filed for this visit. Medical Decision Making  Amount and/or Complexity of Data Reviewed  Labs: ordered. Radiology: ordered. Risk  Prescription drug management. DDX: UTI, pneumonia, non-stemi, stemi,  CHF, trauma, splenomegaly,electrolyte imbalance  69-year-old female who presented to the ED with left upper quadrant pain patient had a blood transfusion 2 units on 18 October. For a low hemoglobin of 6.4. CT scan was ordered and it revealed a marked spleenmegaly and ovarian cysts measuring 3.7 cm simple that ultrasound follow-up is recommended in 3 to 6 months. I attempted to call the patient's hematologist Dr. Krissy Myers however she refused stating that she will call him this morning she had been in the ED too long and was tired. 5:36 AM    I informed the pt that she needed  for adequate evaluation for her  left upper quadant pain. She is awake, alert, and she understands her condition and the risks involved in leaving. She could die or have splenic rupture is untreated.         She is clinically aware of her surroundings and able to ask appropriate questions, the patients  and the nurse present confirms she is not clinically intoxicated and able to

## 2023-10-20 NOTE — TELEPHONE ENCOUNTER
called pt at this time. He will send over oxycodone for pain PRN. For enlarged spleen. Pt will get labs as planned next week etc..    We will plan on doing a bone marrow bx in the near future.

## 2023-10-20 NOTE — PROGRESS NOTES
[employer]  Oncology Social Work  Encounter     Patient Name:  David Mejia     Medical History:  dx heme    Advance Directives:     Narrative: RN was concerned about pt anxiety - SW met with pt and pt stated she was just concerned about how to take medication. SW provided card if needed in future.      Barriers to Care:      Plan:      Referral/Handouts:   Complementary therapies referral  I

## 2023-10-20 NOTE — TELEPHONE ENCOUNTER
PT called in again from this morning.  Wanted to talk to team about enlarged/inflamed spleen    CB# [phone]

## 2023-10-26 ENCOUNTER — TELEPHONE (OUTPATIENT)
Age: 58
End: 2023-10-26

## 2023-10-26 DIAGNOSIS — D61.818 PANCYTOPENIA (HCC): Primary | ICD-10-CM

## 2023-10-27 ENCOUNTER — TELEPHONE (OUTPATIENT)
Age: 58
End: 2023-10-27

## 2023-10-30 ENCOUNTER — TELEPHONE (OUTPATIENT)
Age: 58
End: 2023-10-30

## 2023-10-30 NOTE — TELEPHONE ENCOUNTER
Patient calling states she if feeling terrible. Said she called to s/d her bx she was told it had to be authorized. She doesn't know what to do because she feels horrible

## 2023-10-31 ENCOUNTER — HOSPITAL ENCOUNTER (EMERGENCY)
Facility: HOSPITAL | Age: 58
Discharge: ANOTHER HOSPITAL | End: 2023-10-31
Attending: EMERGENCY MEDICINE
Payer: MEDICARE

## 2023-10-31 ENCOUNTER — APPOINTMENT (OUTPATIENT)
Facility: HOSPITAL | Age: 58
End: 2023-10-31
Attending: EMERGENCY MEDICINE
Payer: MEDICARE

## 2023-10-31 ENCOUNTER — HOSPITAL ENCOUNTER (INPATIENT)
Facility: HOSPITAL | Age: 58
LOS: 3 days | Discharge: HOME | DRG: 812 | End: 2023-11-03
Attending: STUDENT IN AN ORGANIZED HEALTH CARE EDUCATION/TRAINING PROGRAM | Admitting: INTERNAL MEDICINE
Payer: MEDICARE

## 2023-10-31 VITALS
HEART RATE: 79 BPM | DIASTOLIC BLOOD PRESSURE: 63 MMHG | TEMPERATURE: 98 F | SYSTOLIC BLOOD PRESSURE: 105 MMHG | RESPIRATION RATE: 16 BRPM | OXYGEN SATURATION: 100 %

## 2023-10-31 DIAGNOSIS — D64.9 SYMPTOMATIC ANEMIA: Primary | ICD-10-CM

## 2023-10-31 DIAGNOSIS — I47.20 VT (VENTRICULAR TACHYCARDIA) (HCC): Primary | ICD-10-CM

## 2023-10-31 LAB
ABO + RH BLD: NORMAL
ABO + RH BLD: NORMAL
ALBUMIN SERPL-MCNC: 3.4 G/DL (ref 3.5–5)
ALBUMIN/GLOB SERPL: 0.8 (ref 1.1–2.2)
ALP SERPL-CCNC: 121 U/L (ref 45–117)
ALT SERPL-CCNC: 37 U/L (ref 12–78)
ANION GAP SERPL CALC-SCNC: 10 MMOL/L (ref 5–15)
AST SERPL W P-5'-P-CCNC: 33 U/L (ref 15–37)
BASOPHILS # BLD: 0 K/UL (ref 0–0.1)
BASOPHILS NFR BLD: 0 % (ref 0–1)
BILIRUB SERPL-MCNC: 0.8 MG/DL (ref 0.2–1)
BLD PROD TYP BPU: NORMAL
BLOOD BANK DISPENSE STATUS: NORMAL
BLOOD GROUP ANTIBODIES SERPL: NEGATIVE
BLOOD GROUP ANTIBODIES SERPL: POSITIVE
BNP SERPL-MCNC: 1084 PG/ML
BPU ID: NORMAL
BUN SERPL-MCNC: 15 MG/DL (ref 6–20)
BUN/CREAT SERPL: 15 (ref 12–20)
CA-I BLD-MCNC: 8.9 MG/DL (ref 8.5–10.1)
CHLORIDE SERPL-SCNC: 100 MMOL/L (ref 97–108)
CO2 SERPL-SCNC: 26 MMOL/L (ref 21–32)
CREAT SERPL-MCNC: 1.03 MG/DL (ref 0.55–1.02)
CROSSMATCH RESULT: NORMAL
DIFFERENTIAL METHOD BLD: ABNORMAL
EOSINOPHIL # BLD: 0.1 K/UL (ref 0–0.4)
EOSINOPHIL NFR BLD: 2 % (ref 0–7)
ERYTHROCYTE [DISTWIDTH] IN BLOOD BY AUTOMATED COUNT: 19.9 % (ref 11.5–14.5)
GLOBULIN SER CALC-MCNC: 4.2 G/DL (ref 2–4)
GLUCOSE SERPL-MCNC: 128 MG/DL (ref 65–100)
HCT VFR BLD AUTO: 19.5 % (ref 35–47)
HCT VFR BLD AUTO: 20.2 % (ref 35–47)
HGB BLD-MCNC: 6.5 G/DL (ref 11.5–16)
HGB BLD-MCNC: 6.7 G/DL (ref 11.5–16)
IMM GRANULOCYTES # BLD AUTO: 0.3 K/UL
IMM GRANULOCYTES NFR BLD AUTO: 6 %
INR PPP: 1.3 (ref 0.9–1.1)
LYMPHOCYTES # BLD: 1.1 K/UL (ref 0.8–3.5)
LYMPHOCYTES NFR BLD: 21 % (ref 12–49)
MAGNESIUM SERPL-MCNC: 2 MG/DL (ref 1.6–2.4)
MCH RBC QN AUTO: 32.5 PG (ref 26–34)
MCHC RBC AUTO-ENTMCNC: 33.2 G/DL (ref 30–36.5)
MCV RBC AUTO: 98.1 FL (ref 80–99)
MONOCYTES # BLD: 1.1 K/UL (ref 0–1)
MONOCYTES NFR BLD: 21 % (ref 5–13)
NEUTS SEG # BLD: 2.8 K/UL (ref 1.8–8)
NEUTS SEG NFR BLD: 50 % (ref 32–75)
NRBC # BLD: 0.17 K/UL (ref 0–0.01)
NRBC BLD-RTO: 3.2 PER 100 WBC
PLATELET # BLD AUTO: 76 K/UL (ref 150–400)
PMV BLD AUTO: 12.1 FL (ref 8.9–12.9)
POTASSIUM SERPL-SCNC: 3.8 MMOL/L (ref 3.5–5.1)
PROT SERPL-MCNC: 7.6 G/DL (ref 6.4–8.2)
PROTHROMBIN TIME: 16 SEC (ref 11.9–14.6)
RBC # BLD AUTO: 2.06 M/UL (ref 3.8–5.2)
RBC MORPH BLD: ABNORMAL
SODIUM SERPL-SCNC: 136 MMOL/L (ref 136–145)
TRANSFUSION STATUS PATIENT QL: NORMAL
TROPONIN I SERPL HS-MCNC: 6 NG/L (ref 0–51)
UNIT DIVISION: 0
WBC # BLD AUTO: 5.4 K/UL (ref 3.6–11)

## 2023-10-31 PROCEDURE — 71275 CT ANGIOGRAPHY CHEST: CPT

## 2023-10-31 PROCEDURE — 83735 ASSAY OF MAGNESIUM: CPT

## 2023-10-31 PROCEDURE — 83880 ASSAY OF NATRIURETIC PEPTIDE: CPT

## 2023-10-31 PROCEDURE — 86850 RBC ANTIBODY SCREEN: CPT

## 2023-10-31 PROCEDURE — 86870 RBC ANTIBODY IDENTIFICATION: CPT

## 2023-10-31 PROCEDURE — 84484 ASSAY OF TROPONIN QUANT: CPT

## 2023-10-31 PROCEDURE — 85018 HEMOGLOBIN: CPT

## 2023-10-31 PROCEDURE — 86078 PHYS BLOOD BANK SERV REACTJ: CPT

## 2023-10-31 PROCEDURE — 86900 BLOOD TYPING SEROLOGIC ABO: CPT

## 2023-10-31 PROCEDURE — 86920 COMPATIBILITY TEST SPIN: CPT

## 2023-10-31 PROCEDURE — 86901 BLOOD TYPING SEROLOGIC RH(D): CPT

## 2023-10-31 PROCEDURE — 2580000003 HC RX 258: Performed by: INTERNAL MEDICINE

## 2023-10-31 PROCEDURE — 2060000000 HC ICU INTERMEDIATE R&B

## 2023-10-31 PROCEDURE — 36415 COLL VENOUS BLD VENIPUNCTURE: CPT

## 2023-10-31 PROCEDURE — 6360000002 HC RX W HCPCS: Performed by: EMERGENCY MEDICINE

## 2023-10-31 PROCEDURE — 71045 X-RAY EXAM CHEST 1 VIEW: CPT

## 2023-10-31 PROCEDURE — 80053 COMPREHEN METABOLIC PANEL: CPT

## 2023-10-31 PROCEDURE — 93005 ELECTROCARDIOGRAM TRACING: CPT | Performed by: EMERGENCY MEDICINE

## 2023-10-31 PROCEDURE — 6360000004 HC RX CONTRAST MEDICATION: Performed by: EMERGENCY MEDICINE

## 2023-10-31 PROCEDURE — 6360000002 HC RX W HCPCS

## 2023-10-31 PROCEDURE — 86921 COMPATIBILITY TEST INCUBATE: CPT

## 2023-10-31 PROCEDURE — 85014 HEMATOCRIT: CPT

## 2023-10-31 PROCEDURE — 86922 COMPATIBILITY TEST ANTIGLOB: CPT

## 2023-10-31 PROCEDURE — 99285 EMERGENCY DEPT VISIT HI MDM: CPT

## 2023-10-31 PROCEDURE — 85025 COMPLETE CBC W/AUTO DIFF WBC: CPT

## 2023-10-31 PROCEDURE — 1100000000 HC RM PRIVATE

## 2023-10-31 PROCEDURE — 85610 PROTHROMBIN TIME: CPT

## 2023-10-31 PROCEDURE — 86860 RBC ANTIBODY ELUTION: CPT

## 2023-10-31 PROCEDURE — 96374 THER/PROPH/DIAG INJ IV PUSH: CPT

## 2023-10-31 PROCEDURE — 96375 TX/PRO/DX INJ NEW DRUG ADDON: CPT

## 2023-10-31 RX ORDER — ACETAMINOPHEN 325 MG/1
650 TABLET ORAL EVERY 6 HOURS PRN
Status: DISCONTINUED | OUTPATIENT
Start: 2023-10-31 | End: 2023-11-03 | Stop reason: HOSPADM

## 2023-10-31 RX ORDER — KETOROLAC TROMETHAMINE 30 MG/ML
30 INJECTION, SOLUTION INTRAMUSCULAR; INTRAVENOUS
Status: COMPLETED | OUTPATIENT
Start: 2023-10-31 | End: 2023-10-31

## 2023-10-31 RX ORDER — SODIUM CHLORIDE 9 MG/ML
INJECTION, SOLUTION INTRAVENOUS PRN
Status: DISCONTINUED | OUTPATIENT
Start: 2023-10-31 | End: 2023-11-03 | Stop reason: HOSPADM

## 2023-10-31 RX ORDER — ONDANSETRON 4 MG/1
4 TABLET, ORALLY DISINTEGRATING ORAL EVERY 8 HOURS PRN
Status: DISCONTINUED | OUTPATIENT
Start: 2023-10-31 | End: 2023-11-03 | Stop reason: HOSPADM

## 2023-10-31 RX ORDER — SODIUM CHLORIDE 0.9 % (FLUSH) 0.9 %
5-40 SYRINGE (ML) INJECTION PRN
Status: DISCONTINUED | OUTPATIENT
Start: 2023-10-31 | End: 2023-11-03 | Stop reason: HOSPADM

## 2023-10-31 RX ORDER — ACETAMINOPHEN 650 MG/1
650 SUPPOSITORY RECTAL EVERY 6 HOURS PRN
Status: DISCONTINUED | OUTPATIENT
Start: 2023-10-31 | End: 2023-11-03 | Stop reason: HOSPADM

## 2023-10-31 RX ORDER — ONDANSETRON 2 MG/ML
INJECTION INTRAMUSCULAR; INTRAVENOUS
Status: COMPLETED
Start: 2023-10-31 | End: 2023-10-31

## 2023-10-31 RX ORDER — POLYETHYLENE GLYCOL 3350 17 G/17G
17 POWDER, FOR SOLUTION ORAL DAILY PRN
Status: DISCONTINUED | OUTPATIENT
Start: 2023-10-31 | End: 2023-11-03 | Stop reason: HOSPADM

## 2023-10-31 RX ORDER — SODIUM CHLORIDE 9 MG/ML
INJECTION, SOLUTION INTRAVENOUS PRN
Status: DISCONTINUED | OUTPATIENT
Start: 2023-10-31 | End: 2023-10-31 | Stop reason: HOSPADM

## 2023-10-31 RX ORDER — SODIUM CHLORIDE 0.9 % (FLUSH) 0.9 %
5-40 SYRINGE (ML) INJECTION EVERY 12 HOURS SCHEDULED
Status: DISCONTINUED | OUTPATIENT
Start: 2023-10-31 | End: 2023-11-03 | Stop reason: HOSPADM

## 2023-10-31 RX ORDER — ONDANSETRON 2 MG/ML
4 INJECTION INTRAMUSCULAR; INTRAVENOUS EVERY 6 HOURS PRN
Status: DISCONTINUED | OUTPATIENT
Start: 2023-10-31 | End: 2023-11-03 | Stop reason: HOSPADM

## 2023-10-31 RX ADMIN — IOPAMIDOL 100 ML: 755 INJECTION, SOLUTION INTRAVENOUS at 10:29

## 2023-10-31 RX ADMIN — ONDANSETRON 4 MG: 2 INJECTION INTRAMUSCULAR; INTRAVENOUS at 15:15

## 2023-10-31 RX ADMIN — MORPHINE SULFATE 8 MG: 10 INJECTION, SOLUTION INTRAMUSCULAR; INTRAVENOUS at 15:06

## 2023-10-31 RX ADMIN — SODIUM CHLORIDE, PRESERVATIVE FREE 10 ML: 5 INJECTION INTRAVENOUS at 21:00

## 2023-10-31 RX ADMIN — KETOROLAC TROMETHAMINE 30 MG: 30 INJECTION, SOLUTION INTRAMUSCULAR at 09:41

## 2023-10-31 ASSESSMENT — PAIN SCALES - GENERAL
PAINLEVEL_OUTOF10: 1
PAINLEVEL_OUTOF10: 8
PAINLEVEL_OUTOF10: 0
PAINLEVEL_OUTOF10: 1
PAINLEVEL_OUTOF10: 0

## 2023-10-31 ASSESSMENT — PAIN DESCRIPTION - ONSET: ONSET: GRADUAL

## 2023-10-31 ASSESSMENT — PAIN DESCRIPTION - DESCRIPTORS: DESCRIPTORS: STABBING

## 2023-10-31 ASSESSMENT — PAIN - FUNCTIONAL ASSESSMENT
PAIN_FUNCTIONAL_ASSESSMENT: 0-10
PAIN_FUNCTIONAL_ASSESSMENT: ACTIVITIES ARE NOT PREVENTED

## 2023-10-31 ASSESSMENT — PAIN DESCRIPTION - ORIENTATION: ORIENTATION: LEFT

## 2023-10-31 ASSESSMENT — PAIN DESCRIPTION - LOCATION: LOCATION: ABDOMEN

## 2023-10-31 ASSESSMENT — PAIN DESCRIPTION - PAIN TYPE: TYPE: CHRONIC PAIN

## 2023-10-31 ASSESSMENT — PAIN DESCRIPTION - FREQUENCY: FREQUENCY: CONTINUOUS

## 2023-10-31 ASSESSMENT — PAIN SCALES - WONG BAKER: WONGBAKER_NUMERICALRESPONSE: 2

## 2023-10-31 NOTE — PROGRESS NOTES
4 Eyes Skin Assessment     NAME:  Shari Edwards  YOB: 1965  MEDICAL RECORD NUMBER:  [id number]    The patient is being assessed for  Admission    I agree that at least one RN has performed a thorough Head to Toe Skin Assessment on the patient. ALL assessment sites listed below have been assessed. Areas assessed by both nurses:    Head, Face, Ears, Shoulders, Back, Chest, Arms, Elbows, Hands, Sacrum. Buttock, Coccyx, Ischium, and Legs. Feet and Heels        Does the Patient have a Wound?  No noted wound(s)       Navneet Prevention initiated by RN: Yes  Wound Care Orders initiated by RN: No    Pressure Injury (Stage 3,4, Unstageable, DTI, NWPT, and Complex wounds) if present, place Wound referral order by RN under : No    New Ostomies, if present place, Ostomy referral order under : No     Nurse 1 eSignature: Electronically signed by Olivia Valdivia RN on 10/31/23 at 6:15 PM EDT    **SHARE this note so that the co-signing nurse can place an eSignature**    Nurse 2 eSignature: {Esignature:[id number]}Neela Cobb

## 2023-10-31 NOTE — ED NOTES
Patient given update with plan of care to consult with hematology for potential transfer or to treat here     Remberto Gasca RN  10/31/23 [id number]

## 2023-10-31 NOTE — ED NOTES
Patient provided with ice chips per request at this time.  Patient resting comfortably in bed with HOB elevated to high fowlers  at bedside     Gretchen Montez  10/31/23 7900

## 2023-10-31 NOTE — ED TRIAGE NOTES
PT reports left flank and shoulder pain as well as SOB that started yesterday. PT reports seen this month for anemia and an irritated spleen. PT reports she believes her Hmg may be low causing her SOB. Pt reports she is due for a biopsy upcoming.

## 2023-10-31 NOTE — ED NOTES
Called transfer center to get update on bed was told about 30 min ago they were cleaning the bed and could not give me info till done. Spoke to transfer center now and bed is still not ready they are still cleaning it.  I requested that we could get the information and set up transport as we are a good hour from [address] [place] [address]  10/31/23 [id number]

## 2023-10-31 NOTE — ED PROVIDER NOTES
Transportation Needs: Not on file   Physical Activity: Not on file   Stress: Not on file   Social Connections: Not on file   Intimate Partner Violence: Not on file   Depression: Not at risk (10/20/2023)    PHQ-2     PHQ-2 Score: 0   Housing Stability: Not on file       PHYSICAL EXAM   Physical Exam  Vitals and nursing note reviewed. Constitutional:       General: She is not in acute distress. Appearance: Normal appearance. She is morbidly obese. She is not ill-appearing, toxic-appearing or diaphoretic. HENT:      Head: Normocephalic and atraumatic. Nose: Nose normal.      Mouth/Throat:      Mouth: Mucous membranes are moist.      Pharynx: No oropharyngeal exudate. Eyes:      Extraocular Movements: Extraocular movements intact. Conjunctiva/sclera: Conjunctivae normal.      Pupils: Pupils are equal, round, and reactive to light. Cardiovascular:      Rate and Rhythm: Normal rate and regular rhythm. Pulses: Normal pulses. Heart sounds: Normal heart sounds. Pulmonary:      Effort: Pulmonary effort is normal.      Breath sounds: Normal breath sounds. Chest:      Chest wall: No deformity or tenderness. Abdominal:      General: Bowel sounds are normal.      Palpations: Abdomen is soft. Tenderness: There is no abdominal tenderness. Musculoskeletal:         General: Normal range of motion. Cervical back: Normal range of motion and neck supple. Skin:     General: Skin is warm and dry. Capillary Refill: Capillary refill takes less than 2 seconds. Neurological:      General: No focal deficit present. Mental Status: She is alert and oriented to person, place, and time. Psychiatric:         Mood and Affect: Mood normal.         Behavior: Behavior normal.           SCREENINGS              LAB, EKG AND DIAGNOSTIC RESULTS   Labs:  No results found for this or any previous visit (from the past 12 hour(s)). EKG: EKG interpreted by me.  Shows Normal Sinus Rhythm with

## 2023-10-31 NOTE — ED NOTES
Patient resting comfortably in bed with HOB elevated.  Family at bedside     Gretchen Nieto  10/31/23 [phone]

## 2023-10-31 NOTE — PROGRESS NOTES
End of Shift Note    Bedside shift change report given to [facility] (oncoming nurse) by Odilon Epstein RN (offgoing nurse). Report included the following information SBAR    Shift worked:  days     Shift summary and any significant changes:     New admit     Concerns for physician to address:       Zone phone for oncoming shift:          Activity:     Number times ambulated in hallways past shift: 0  Number of times OOB to chair past shift: 0    Cardiac:   Cardiac Monitoring: Yes           Access:  Current line(s): PIV     Genitourinary:   Urinary status: voiding    Respiratory:      Chronic home O2 use?: NO  Incentive spirometer at bedside: NO       GI:     Current diet:  ADULT DIET;  Regular  Diet NPO  Passing flatus: YES  Tolerating current diet: YES       Pain Management:   Patient states pain is manageable on current regimen: YES    Skin:     Interventions: float heels and increase time out of bed    Patient Safety:  Fall Score:    Interventions: gripper socks       Length of Stay:  Expected LOS: 3  Actual LOS: 0      Odilon Epstein RN

## 2023-10-31 NOTE — ED NOTES
Reports given to [facility] with [facility] for transport to [facility] at this time     Salma Mitchell RN  10/31/23 9131

## 2023-10-31 NOTE — CONSENT
Informed Consent for Blood Component Transfusion Note    I have discussed with the patient the rationale for blood component transfusion; its benefits in treating or preventing fatigue, organ damage, or death; and its risk which includes mild transfusion reactions, rare risk of blood borne infection, or more serious but rare reactions. I have discussed the alternatives to transfusion, including the risk and consequences of not receiving transfusion. The patient had an opportunity to ask questions and had agreed to proceed with transfusion of blood components.     Electronically signed by Tammie Corona MD on 10/31/23 at 11:36 AM EDT

## 2023-10-31 NOTE — ED NOTES
Patient back from CT resting comfortably in bed with [facility] elevated patient reports pain is better but still increases with movement or if she coughs     Estefania Sabillon RN  10/31/23 [id number]

## 2023-11-01 ENCOUNTER — APPOINTMENT (OUTPATIENT)
Facility: HOSPITAL | Age: 58
DRG: 812 | End: 2023-11-01
Attending: STUDENT IN AN ORGANIZED HEALTH CARE EDUCATION/TRAINING PROGRAM
Payer: MEDICARE

## 2023-11-01 ENCOUNTER — TELEPHONE (OUTPATIENT)
Age: 58
End: 2023-11-01

## 2023-11-01 PROBLEM — D47.1 CHRONIC MYELOPROLIFERATIVE DISEASE (HCC): Status: ACTIVE | Noted: 2023-11-01

## 2023-11-01 PROBLEM — R16.1 SPLENOMEGALY: Status: ACTIVE | Noted: 2023-11-01

## 2023-11-01 LAB
ABO + RH BLD: NORMAL
ABO + RH BLD: NORMAL
ALBUMIN SERPL-MCNC: 3.2 G/DL (ref 3.5–5)
ALBUMIN/GLOB SERPL: 0.9 (ref 1.1–2.2)
ALP SERPL-CCNC: 109 U/L (ref 45–117)
ALT SERPL-CCNC: 30 U/L (ref 12–78)
ANION GAP SERPL CALC-SCNC: 8 MMOL/L (ref 5–15)
AST SERPL-CCNC: 23 U/L (ref 15–37)
BASOPHILS # BLD: 0 K/UL (ref 0–0.1)
BASOPHILS # BLD: 0.1 K/UL (ref 0–0.1)
BASOPHILS NFR BLD: 0 % (ref 0–1)
BASOPHILS NFR BLD: 2 % (ref 0–1)
BILIRUB SERPL-MCNC: 0.7 MG/DL (ref 0.2–1)
BLD PROD TYP BPU: NORMAL
BLOOD BAG HEMOLYSIS: NORMAL
BLOOD GROUP ANTIBODIES SERPL: NORMAL
BLOOD GROUP ANTIBODIES SERPL: POSITIVE
BUN SERPL-MCNC: 20 MG/DL (ref 6–20)
BUN/CREAT SERPL: 22 (ref 12–20)
CALCIUM SERPL-MCNC: 8.8 MG/DL (ref 8.5–10.1)
CHLORIDE SERPL-SCNC: 104 MMOL/L (ref 97–108)
CHOLEST SERPL-MCNC: 110 MG/DL
CLERICAL ERRORS: NORMAL
CO2 SERPL-SCNC: 26 MMOL/L (ref 21–32)
CREAT SERPL-MCNC: 0.9 MG/DL (ref 0.55–1.02)
DAT P TRANSF RBC QL: NEGATIVE
DAT RBC QL: NEGATIVE
DIFFERENTIAL METHOD BLD: ABNORMAL
DIFFERENTIAL METHOD BLD: ABNORMAL
EOSINOPHIL # BLD: 0 K/UL (ref 0–0.4)
EOSINOPHIL # BLD: 0 K/UL (ref 0–0.4)
EOSINOPHIL NFR BLD: 0 % (ref 0–7)
EOSINOPHIL NFR BLD: 0 % (ref 0–7)
ERYTHROCYTE [DISTWIDTH] IN BLOOD BY AUTOMATED COUNT: 20.1 % (ref 11.5–14.5)
ERYTHROCYTE [DISTWIDTH] IN BLOOD BY AUTOMATED COUNT: 20.2 % (ref 11.5–14.5)
FERRITIN SERPL-MCNC: 497 NG/ML (ref 26–388)
GLOBULIN SER CALC-MCNC: 3.7 G/DL (ref 2–4)
GLUCOSE SERPL-MCNC: 120 MG/DL (ref 65–100)
HCT VFR BLD AUTO: 17 % (ref 35–47)
HCT VFR BLD AUTO: 18.2 % (ref 35–47)
HCT VFR BLD AUTO: 20.2 % (ref 35–47)
HDLC SERPL-MCNC: 12 MG/DL
HDLC SERPL: 9.2 (ref 0–5)
HEMOLYSIS, POST TRANSFUSION: NORMAL
HEMOLYSIS, PRE TRANSFUSION: NORMAL
HGB BLD-MCNC: 5.6 G/DL (ref 11.5–16)
HGB BLD-MCNC: 5.8 G/DL (ref 11.5–16)
HGB BLD-MCNC: 6.6 G/DL (ref 11.5–16)
HISTORY CHECK: NORMAL
IMM GRANULOCYTES # BLD AUTO: 0 K/UL (ref 0–0.04)
IMM GRANULOCYTES # BLD AUTO: 0 K/UL (ref 0–0.04)
IMM GRANULOCYTES NFR BLD AUTO: 0 % (ref 0–0.5)
IMM GRANULOCYTES NFR BLD AUTO: 0 % (ref 0–0.5)
IRON SERPL-MCNC: 79 UG/DL (ref 35–150)
LDLC SERPL CALC-MCNC: 54.4 MG/DL (ref 0–100)
LYMPHOCYTES # BLD: 1 K/UL (ref 0.8–3.5)
LYMPHOCYTES # BLD: 1.6 K/UL (ref 0.8–3.5)
LYMPHOCYTES NFR BLD: 24 % (ref 12–49)
LYMPHOCYTES NFR BLD: 33 % (ref 12–49)
MCH RBC QN AUTO: 31.7 PG (ref 26–34)
MCH RBC QN AUTO: 32 PG (ref 26–34)
MCHC RBC AUTO-ENTMCNC: 31.9 G/DL (ref 30–36.5)
MCHC RBC AUTO-ENTMCNC: 32.9 G/DL (ref 30–36.5)
MCV RBC AUTO: 97.1 FL (ref 80–99)
MCV RBC AUTO: 99.5 FL (ref 80–99)
MD INTERPRETATION: NORMAL
METAMYELOCYTES NFR BLD MANUAL: 1 %
MONOCYTES # BLD: 0.2 K/UL (ref 0–1)
MONOCYTES # BLD: 0.8 K/UL (ref 0–1)
MONOCYTES NFR BLD: 20 % (ref 5–13)
MONOCYTES NFR BLD: 5 % (ref 5–13)
NEUTS BAND NFR BLD MANUAL: 1 %
NEUTS SEG # BLD: 2.4 K/UL (ref 1.8–8)
NEUTS SEG # BLD: 2.5 K/UL (ref 1.8–8)
NEUTS SEG NFR BLD: 52 % (ref 32–75)
NEUTS SEG NFR BLD: 56 % (ref 32–75)
NRBC # BLD: 0.14 K/UL (ref 0–0.01)
NRBC # BLD: 0.14 K/UL (ref 0–0.01)
NRBC BLD-RTO: 2.9 PER 100 WBC
NRBC BLD-RTO: 3.3 PER 100 WBC
NT PRO BNP: 861 PG/ML
OTHER CELLS NFR BLD MANUAL: 6
PATH REV BLD -IMP: NORMAL
PATH REV BLD -IMP: NORMAL
PLATELET # BLD AUTO: 68 K/UL (ref 150–400)
PLATELET # BLD AUTO: 74 K/UL (ref 150–400)
PLATELET COMMENT: ABNORMAL
PMV BLD AUTO: 11.3 FL (ref 8.9–12.9)
PMV BLD AUTO: 12.4 FL (ref 8.9–12.9)
POTASSIUM SERPL-SCNC: 3.9 MMOL/L (ref 3.5–5.1)
PROT SERPL-MCNC: 6.9 G/DL (ref 6.4–8.2)
RBC # BLD AUTO: 1.75 M/UL (ref 3.8–5.2)
RBC # BLD AUTO: 1.83 M/UL (ref 3.8–5.2)
RBC MORPH BLD: ABNORMAL
RETICS # AUTO: 0.02 M/UL (ref 0.02–0.08)
RETICS/RBC NFR AUTO: 1.3 % (ref 0.7–2.1)
SODIUM SERPL-SCNC: 138 MMOL/L (ref 136–145)
SPECIMEN EXP DATE BLD: NORMAL
TRIGL SERPL-MCNC: 218 MG/DL
TROPONIN I SERPL HS-MCNC: 4 NG/L (ref 0–51)
TRXN WORKUP COMMENT,TRCOM: NORMAL
TSH SERPL DL<=0.05 MIU/L-ACNC: 3.13 UIU/ML (ref 0.36–3.74)
UNIT NUMBER: NORMAL
VLDLC SERPL CALC-MCNC: 43.6 MG/DL
WBC # BLD AUTO: 4.2 K/UL (ref 3.6–11)
WBC # BLD AUTO: 4.8 K/UL (ref 3.6–11)

## 2023-11-01 PROCEDURE — 1100000000 HC RM PRIVATE

## 2023-11-01 PROCEDURE — 2500000003 HC RX 250 WO HCPCS: Performed by: NURSE PRACTITIONER

## 2023-11-01 PROCEDURE — A4216 STERILE WATER/SALINE, 10 ML: HCPCS | Performed by: NURSE PRACTITIONER

## 2023-11-01 PROCEDURE — 2580000003 HC RX 258: Performed by: INTERNAL MEDICINE

## 2023-11-01 PROCEDURE — 88313 SPECIAL STAINS GROUP 2: CPT

## 2023-11-01 PROCEDURE — 88342 IMHCHEM/IMCYTCHM 1ST ANTB: CPT

## 2023-11-01 PROCEDURE — 82728 ASSAY OF FERRITIN: CPT

## 2023-11-01 PROCEDURE — 83036 HEMOGLOBIN GLYCOSYLATED A1C: CPT

## 2023-11-01 PROCEDURE — P9016 RBC LEUKOCYTES REDUCED: HCPCS

## 2023-11-01 PROCEDURE — 6370000000 HC RX 637 (ALT 250 FOR IP): Performed by: NURSE PRACTITIONER

## 2023-11-01 PROCEDURE — 83880 ASSAY OF NATRIURETIC PEPTIDE: CPT

## 2023-11-01 PROCEDURE — 36415 COLL VENOUS BLD VENIPUNCTURE: CPT

## 2023-11-01 PROCEDURE — 36430 TRANSFUSION BLD/BLD COMPNT: CPT

## 2023-11-01 PROCEDURE — 80061 LIPID PANEL: CPT

## 2023-11-01 PROCEDURE — 85025 COMPLETE CBC W/AUTO DIFF WBC: CPT

## 2023-11-01 PROCEDURE — 84484 ASSAY OF TROPONIN QUANT: CPT

## 2023-11-01 PROCEDURE — 88305 TISSUE EXAM BY PATHOLOGIST: CPT

## 2023-11-01 PROCEDURE — 6370000000 HC RX 637 (ALT 250 FOR IP): Performed by: INTERNAL MEDICINE

## 2023-11-01 PROCEDURE — 6360000002 HC RX W HCPCS: Performed by: STUDENT IN AN ORGANIZED HEALTH CARE EDUCATION/TRAINING PROGRAM

## 2023-11-01 PROCEDURE — 83540 ASSAY OF IRON: CPT

## 2023-11-01 PROCEDURE — 99156 MOD SED OTH PHYS/QHP 5/>YRS: CPT

## 2023-11-01 PROCEDURE — 6360000002 HC RX W HCPCS: Performed by: NURSE PRACTITIONER

## 2023-11-01 PROCEDURE — 85018 HEMOGLOBIN: CPT

## 2023-11-01 PROCEDURE — 99223 1ST HOSP IP/OBS HIGH 75: CPT | Performed by: INTERNAL MEDICINE

## 2023-11-01 PROCEDURE — 88341 IMHCHEM/IMCYTCHM EA ADD ANTB: CPT

## 2023-11-01 PROCEDURE — 85014 HEMATOCRIT: CPT

## 2023-11-01 PROCEDURE — 80053 COMPREHEN METABOLIC PANEL: CPT

## 2023-11-01 PROCEDURE — 84443 ASSAY THYROID STIM HORMONE: CPT

## 2023-11-01 PROCEDURE — 2580000003 HC RX 258: Performed by: NURSE PRACTITIONER

## 2023-11-01 PROCEDURE — C9113 INJ PANTOPRAZOLE SODIUM, VIA: HCPCS | Performed by: NURSE PRACTITIONER

## 2023-11-01 PROCEDURE — 85045 AUTOMATED RETICULOCYTE COUNT: CPT

## 2023-11-01 RX ORDER — SODIUM CHLORIDE 9 MG/ML
INJECTION, SOLUTION INTRAVENOUS PRN
Status: DISCONTINUED | OUTPATIENT
Start: 2023-11-01 | End: 2023-11-03 | Stop reason: HOSPADM

## 2023-11-01 RX ORDER — MIDAZOLAM HYDROCHLORIDE 1 MG/ML
10 INJECTION INTRAMUSCULAR; INTRAVENOUS
Status: DISCONTINUED | OUTPATIENT
Start: 2023-11-01 | End: 2023-11-01

## 2023-11-01 RX ORDER — VITAMIN B COMPLEX
1000 TABLET ORAL DAILY
Status: DISCONTINUED | OUTPATIENT
Start: 2023-11-01 | End: 2023-11-03 | Stop reason: HOSPADM

## 2023-11-01 RX ORDER — LORAZEPAM 0.5 MG/1
0.5 TABLET ORAL DAILY PRN
Status: DISCONTINUED | OUTPATIENT
Start: 2023-11-01 | End: 2023-11-03 | Stop reason: HOSPADM

## 2023-11-01 RX ORDER — ROSUVASTATIN CALCIUM 10 MG/1
10 TABLET, COATED ORAL NIGHTLY
Status: DISCONTINUED | OUTPATIENT
Start: 2023-11-01 | End: 2023-11-03 | Stop reason: HOSPADM

## 2023-11-01 RX ORDER — FENTANYL CITRATE 50 UG/ML
100 INJECTION, SOLUTION INTRAMUSCULAR; INTRAVENOUS
Status: DISCONTINUED | OUTPATIENT
Start: 2023-11-01 | End: 2023-11-01

## 2023-11-01 RX ORDER — OXYCODONE HYDROCHLORIDE 5 MG/1
10 TABLET ORAL EVERY 4 HOURS PRN
Status: DISCONTINUED | OUTPATIENT
Start: 2023-11-01 | End: 2023-11-03 | Stop reason: HOSPADM

## 2023-11-01 RX ORDER — SODIUM CHLORIDE 9 MG/ML
INJECTION, SOLUTION INTRAVENOUS CONTINUOUS
Status: DISCONTINUED | OUTPATIENT
Start: 2023-11-01 | End: 2023-11-01

## 2023-11-01 RX ORDER — AMLODIPINE BESYLATE 5 MG/1
10 TABLET ORAL DAILY
Status: DISCONTINUED | OUTPATIENT
Start: 2023-11-01 | End: 2023-11-03 | Stop reason: HOSPADM

## 2023-11-01 RX ORDER — PHENYTOIN SODIUM 100 MG/1
300 CAPSULE, EXTENDED RELEASE ORAL
Status: DISCONTINUED | OUTPATIENT
Start: 2023-11-01 | End: 2023-11-03 | Stop reason: HOSPADM

## 2023-11-01 RX ORDER — ALBUTEROL SULFATE 2.5 MG/3ML
2.5 SOLUTION RESPIRATORY (INHALATION) EVERY 4 HOURS PRN
Status: DISCONTINUED | OUTPATIENT
Start: 2023-11-01 | End: 2023-11-03 | Stop reason: HOSPADM

## 2023-11-01 RX ORDER — HYDROMORPHONE HYDROCHLORIDE 1 MG/ML
0.5 INJECTION, SOLUTION INTRAMUSCULAR; INTRAVENOUS; SUBCUTANEOUS EVERY 4 HOURS PRN
Status: COMPLETED | OUTPATIENT
Start: 2023-11-01 | End: 2023-11-02

## 2023-11-01 RX ADMIN — ROSUVASTATIN CALCIUM 10 MG: 10 TABLET, COATED ORAL at 21:18

## 2023-11-01 RX ADMIN — MIDAZOLAM 2 MG: 1 INJECTION INTRAMUSCULAR; INTRAVENOUS at 13:20

## 2023-11-01 RX ADMIN — HYDROMORPHONE HYDROCHLORIDE 0.5 MG: 1 INJECTION, SOLUTION INTRAMUSCULAR; INTRAVENOUS; SUBCUTANEOUS at 02:16

## 2023-11-01 RX ADMIN — OXYCODONE 10 MG: 5 TABLET ORAL at 19:12

## 2023-11-01 RX ADMIN — FENTANYL CITRATE 50 MCG: 50 INJECTION INTRAMUSCULAR; INTRAVENOUS at 13:27

## 2023-11-01 RX ADMIN — SODIUM CHLORIDE, PRESERVATIVE FREE 10 ML: 5 INJECTION INTRAVENOUS at 21:19

## 2023-11-01 RX ADMIN — MIDAZOLAM 2 MG: 1 INJECTION INTRAMUSCULAR; INTRAVENOUS at 13:25

## 2023-11-01 RX ADMIN — SODIUM CHLORIDE 40 MG: 9 INJECTION INTRAMUSCULAR; INTRAVENOUS; SUBCUTANEOUS at 09:33

## 2023-11-01 RX ADMIN — EXTENDED PHENYTOIN SODIUM 30 MG: 30 CAPSULE ORAL at 13:57

## 2023-11-01 RX ADMIN — AMLODIPINE BESYLATE 10 MG: 5 TABLET ORAL at 07:58

## 2023-11-01 RX ADMIN — MIDAZOLAM 1 MG: 1 INJECTION INTRAMUSCULAR; INTRAVENOUS at 13:30

## 2023-11-01 RX ADMIN — Medication 1000 UNITS: at 07:58

## 2023-11-01 RX ADMIN — SODIUM CHLORIDE: 9 INJECTION, SOLUTION INTRAVENOUS at 02:04

## 2023-11-01 RX ADMIN — PHENYTOIN SODIUM 300 MG: 100 CAPSULE ORAL at 07:57

## 2023-11-01 RX ADMIN — FENTANYL CITRATE 50 MCG: 50 INJECTION INTRAMUSCULAR; INTRAVENOUS at 13:22

## 2023-11-01 ASSESSMENT — PAIN DESCRIPTION - DESCRIPTORS
DESCRIPTORS: BURNING
DESCRIPTORS: ACHING;THROBBING;PRESSURE

## 2023-11-01 ASSESSMENT — PAIN SCALES - GENERAL
PAINLEVEL_OUTOF10: 5
PAINLEVEL_OUTOF10: 0
PAINLEVEL_OUTOF10: 6
PAINLEVEL_OUTOF10: 0
PAINLEVEL_OUTOF10: 6
PAINLEVEL_OUTOF10: 3

## 2023-11-01 ASSESSMENT — ENCOUNTER SYMPTOMS
ALLERGIC/IMMUNOLOGIC NEGATIVE: 1
SHORTNESS OF BREATH: 1
EYES NEGATIVE: 1
GASTROINTESTINAL NEGATIVE: 1

## 2023-11-01 ASSESSMENT — PAIN - FUNCTIONAL ASSESSMENT
PAIN_FUNCTIONAL_ASSESSMENT: NONE - DENIES PAIN
PAIN_FUNCTIONAL_ASSESSMENT: ACTIVITIES ARE NOT PREVENTED

## 2023-11-01 ASSESSMENT — PAIN DESCRIPTION - LOCATION
LOCATION: FLANK
LOCATION: BACK

## 2023-11-01 ASSESSMENT — PAIN DESCRIPTION - ORIENTATION
ORIENTATION: LEFT
ORIENTATION: LEFT

## 2023-11-01 NOTE — PROGRESS NOTES
Arrived into the xray recovery area A/O x 4 w/o complaint. Here today for an image guided bone marrow biopsy.

## 2023-11-01 NOTE — H&P
Hospitalist Admission Note    NAME: Shawna Drew   :  1965   MRN:  [id number]     Date/Time:  2023 7:03 AM    Patient PCP: Riley Aldana PA-C    ______________________________________________________________________  Given the patient's current clinical presentation in regards to the patient's multiple medical problems, complex decision making was performed, which includes reviewing the patient's available past medical records, laboratory results, and diagnostic studies. My assessment of this patient's clinical condition and my plan of care is as follows. Assessment / Plan:    Acute on Chronic Anemia  Patient with dyspnea with no associated chest pain for the past 2 days; on room air  AM Hemoglobin 5.8, down from 6.7; platelets 74  Elevated Pro-BNP level 1,084 at OSH; troponin 6  CTA chest on 10/31/2023 showed splenomegaly. No pulmonary embolism demonstrated. 3 mm right middle lobe nodule. CT abdomen/pelvis on 10/20/2023 revealed marked splenomegaly. 3.7 cm simple right ovarian cyst.  CXR 10/31/2023 showed no acute process  Patient is being seen by Dr. Derrick Metz, Hematology Oncologist for anemia and splenomegaly  Patient is planned for bone marrow biopsy by Dr. Derrick Metz; per heme-onc no blood transfusion at this time.   Per notes, patient has antibodies making blood availability prolonged while going through crossmatching process  IR consultation appreciated  Hematology consultation   NS @ 75 ml/hr  Daily CBC, BMP    Cerebrovascular Accident  Initially occurred in ; last known CVA in ; no residual deficits  Continue amlodipine, rosuvastatin  Lipid profile, Hgb A1c, TSH, BMP, CBC    Seizure Disorder  Diagnosed with seizures as a child (1years old ); per patient, last known seizure activity in ; per patient, last EEG was at 10years old; Emilia Sales PA-C is patient's PCP who manages her seizure medications  Continue phenytoin      Medical Decision Making:   I Range    Sodium 138 136 - 145 mmol/L    Potassium 3.9 3.5 - 5.1 mmol/L    Chloride 104 97 - 108 mmol/L    CO2 26 21 - 32 mmol/L    Anion Gap 8 5 - 15 mmol/L    Glucose 120 (H) 65 - 100 mg/dL    BUN 20 6 - 20 MG/DL    Creatinine 0.90 0.55 - 1.02 MG/DL    Bun/Cre Ratio 22 (H) 12 - 20      Est, Glom Filt Rate >60 >60 ml/min/1.73m2    Calcium 8.8 8.5 - 10.1 MG/DL    Total Bilirubin 0.7 0.2 - 1.0 MG/DL    ALT 30 12 - 78 U/L    AST 23 15 - 37 U/L    Alk Phosphatase 109 45 - 117 U/L    Total Protein 6.9 6.4 - 8.2 g/dL    Albumin 3.2 (L) 3.5 - 5.0 g/dL    Globulin 3.7 2.0 - 4.0 g/dL    Albumin/Globulin Ratio 0.9 (L) 1.1 - 2.2     CBC with Auto Differential    Collection Time: 11/01/23  2:28 AM   Result Value Ref Range    WBC 4.8 3.6 - 11.0 K/uL    RBC 1.83 (L) 3.80 - 5.20 M/uL    Hemoglobin 5.8 (LL) 11.5 - 16.0 g/dL    Hematocrit 18.2 (L) 35.0 - 47.0 %    MCV 99.5 (H) 80.0 - 99.0 FL    MCH 31.7 26.0 - 34.0 PG    MCHC 31.9 30.0 - 36.5 g/dL    RDW 20.2 (H) 11.5 - 14.5 %    Platelets 74 (L) 893 - 400 K/uL    MPV 12.4 8.9 - 12.9 FL    Nucleated RBCs 2.9 (H) 0  WBC    nRBC 0.14 (H) 0.00 - 0.01 K/uL    Neutrophils % PENDING %    Lymphocytes % PENDING %    Monocytes % PENDING %    Eosinophils % PENDING %    Basophils % PENDING %    Immature Granulocytes PENDING %    Neutrophils Absolute PENDING K/UL    Lymphocytes Absolute PENDING K/UL    Monocytes Absolute PENDING K/UL    Eosinophils Absolute PENDING K/UL    Basophils Absolute PENDING K/UL    Absolute Immature Granulocyte PENDING K/UL    Differential Type PENDING    Reticulocytes    Collection Time: 11/01/23  2:28 AM   Result Value Ref Range    Reticulocyte Count,Automated 1.3 0.7 - 2.1 %    Absolute Retic # 0.0247 0.0164 - 0.0776 M/ul         Imaging Results (most recent):  CTA CHEST W WO CONTRAST PE Eval    Result Date: 10/31/2023  EXAM:  CTA CHEST W WO CONTRAST INDICATION: Pulmonary embolism COMPARISON: Earlier chest x-ray TECHNIQUE: Helical thin section chest CT

## 2023-11-01 NOTE — PROGRESS NOTES
2024 RN notified NP Randi Davis via perfect serve pt's Hgb results. 0227 RN notified ZORAN Davis via perfect serve of pt's Hgb results. No new orders at this time.

## 2023-11-01 NOTE — PLAN OF CARE
Problem: Chronic Conditions and Co-morbidities  Goal: Patient's chronic conditions and co-morbidity symptoms are monitored and maintained or improved  Outcome: Progressing  Flowsheets (Taken 10/31/2023 1700 by Austin Burleson RN)  Care Plan - Patient's Chronic Conditions and Co-Morbidity Symptoms are Monitored and Maintained or Improved: Monitor and assess patient's chronic conditions and comorbid symptoms for stability, deterioration, or improvement     Problem: Discharge Planning  Goal: Discharge to home or other facility with appropriate resources  Outcome: Progressing  Flowsheets  Taken 10/31/2023 1739 by Austin Burleson RN  Discharge to home or other facility with appropriate resources: Identify barriers to discharge with patient and caregiver  Taken 10/31/2023 1700 by Austin Burleson RN  Discharge to home or other facility with appropriate resources: Identify barriers to discharge with patient and caregiver     Problem: Pain  Goal: Verbalizes/displays adequate comfort level or baseline comfort level  Outcome: Progressing     Problem: Safety - Adult  Goal: Free from fall injury  Outcome: Progressing

## 2023-11-01 NOTE — CONSULTS
[facility]   at [address] [place], [address] [place], [place] [address], [facility], [address] [place] [address] Ave   [phone]             Progress Note          Patient: Isamar Jimenez  MRN: [id number]   SSN: [id number]    YOB: 1965             Subjective:        Isamar Jimenez is a 60 y.o.  female who I am seeing in follow up for thrombocytosis. She is admitted with  subacute difficulties with visual processing, cognitive impairment, slurred speech, dizziness, headaches x 2-3 weeks. MRI Brain this admission reveals restricted diffusion and enhancement of the R parieto-occipital and R frontal territories with rather  confluent T2 hyperintensity in a similar distribution on FLAIR. CTA H/N did not reveal any significant stenosis of LVO. She has been taking Hydroxyurea. Which has stopped due to severe anemia. She has been feeling poorly with no energy. She will need transfusion today. She underwent a bone marrow bx today. She has ongoing pain in the abdomen.               Review of Systems:      Constitutional: excessive fatigue   Eyes: negative   Ears, Nose, Mouth, Throat, and Face: negative   Respiratory: negative   Cardiovascular: negative   Gastrointestinal: negative   Genitourinary:negative   Integument/Breast: negative   Hematologic/Lymphatic: negative   Musculoskeletal:negative   Neurological: negative          Past Medical History:   Diagnosis Date    Asthma     Cerebral artery occlusion with cerebral infarction ([address])     History of stroke without residual deficits     Morbid obesity with BMI of 45.0-49.9, adult ([address]) 2016    MRSA carrier 2019    Osteoarthritis of multiple joints     Primary localized osteoarthritis of right hip 2017    Seizure disorder ([address])     last seizure 2006    Thrombocytosis        Past Surgical History:   Procedure Laterality Date    BREAST BIOPSY Left     NEG     SECTION      x 2    HEENT      LASIK

## 2023-11-01 NOTE — TELEPHONE ENCOUNTER
Consult:    Name: Roldan Harris    : 1965    Provider: Bibi Lynne NP    Reason:  [address]    Room: [facility]    Phone: [phone]    Nurse: Karen Anders

## 2023-11-01 NOTE — CARE COORDINATION
Care Management Initial Assessment       RUR: 16%  Readmission? No  1st IM letter given? Yes - 10/31/23  1st  letter given: No       11/01/23 0825   Service Assessment   Patient Orientation Alert and Oriented   Cognition Alert   History Provided By Patient   Primary Caregiver Self   Support Systems Spouse/Significant Other;Children   Patient's Healthcare Decision Maker is: Patient Declined (Legal Next of Kin Remains as Decision Maker)  (Pt's spouse Yovanny Cook)   PCP Verified by CM Yes   Last Visit to PCP Within last 3 months   Prior Functional Level Independent in ADLs/IADLs   Current Functional Level Independent in ADLs/IADLs   Can patient return to prior living arrangement Yes   Family able to assist with home care needs: No   Would you like for me to discuss the discharge plan with any other family members/significant others, and if so, who? No   Financial Resources Medicare; Other (Comment)  ([facility])   Community Resources None   Social/Functional History   Lives With Spouse   Type of Home [employer], rolling;Crutches  (Built in shower bench)   Active  Yes   Discharge Planning   Type of [address] Prior To Admission None   Patient expects to be discharged to: [place]     CM introduce self, explain role and confirmed demographics with pt. Pt lives with her spouse in a two story home with four steps to enter with railing. Pt has  a hx of home health but was unable to recall the agency's name. No hx of inpatient rehab or SNF. Pt uses [facility]. At the time of d/c pt's family will transport. CM will follow and assist with d/c planning.     Debbie Gaffney

## 2023-11-01 NOTE — DISCHARGE INSTRUCTIONS
[facility]  Radiology Department  [phone]    Radiologist: Binta Thompson    Date: 11/01/2023       Bone Marrow Biopsy Discharge Instructions      Go home and rest  and restrict your activity the next 24 hours. You have been given sedating medications, so do not drive or make important decisions today. Resume your previous diet and prescribed medications. You may shower in 24 hours. Do not soak or swim until the biopsy site has healed completely to minimize any risk of infection. Watch site for redness, drainage, pus, foul odor, increasing pain and fevers. Should this occur call you doctor immediatly. You may take Tylenol if allowed, as directed on the label, for pain or discomfort. Avoid Ibuprofen (Advil, Motrin etc.) and Aspirin today as they may increase your risk of bleeding. Be sure to follow up with your physician, and let him know how you are progressing and to receive your results. If you have any questions about your procedure today please call and ask to speak to a radiology nurse.        I   Check CBC next week and update hematologist about  the result

## 2023-11-01 NOTE — H&P
INTERVENTIONAL RADIOLOGY  Preoperative History and Physical      Patient:  Brian Keller  :  1965  Age:  59 y.o. MRN:  [id number]  Today's Date:  2023      CC / HPI   Brian Keller is a 59 y.o. female with a history of thrombocytosis who presents for CT guided bone marrow biopsy.     PAST MEDICAL HISTORY  Past Medical History:   Diagnosis Date    Asthma     Cerebral artery occlusion with cerebral infarction [facility])     History of stroke without residual deficits     Morbid obesity with BMI of 45.0-49.9, adult ([address]) 2016    MRSA carrier 2019    Osteoarthritis of multiple joints     Primary localized osteoarthritis of right hip 2017    Seizure disorder ([address] [facility])     last seizure 2006    Thrombocytosis        PAST SURGICAL HISTORY  Past Surgical History:   Procedure Laterality Date    BREAST BIOPSY Left     NEG     SECTION      x 2    HEENT      LASIK    JOINT REPLACEMENT      KNEE ARTHROSCOPY Bilateral     ORTHOPEDIC SURGERY Left     TKR    ORTHOPEDIC SURGERY Left 2016    [facility]    ORTHOPEDIC SURGERY Right     RTH    OTHER SURGICAL HISTORY      BIOPSY LEFT LOWER LEG(AGE 23)    TONSILLECTOMY      TOTAL KNEE ARTHROPLASTY Right 2018    TUBAL LIGATION         SOCIAL HISTORY  Social History     Socioeconomic History    Marital status:      Spouse name: Not on file    Number of children: Not on file    Years of education: Not on file    Highest education level: Not on file   Occupational History    Not on file   Tobacco Use    Smoking status: Never     Passive exposure: Never    Smokeless tobacco: Never   Vaping Use    Vaping Use: Never used   Substance and Sexual Activity    Alcohol use: Not Currently     Comment: social/seldomly    Drug use: No    Sexual activity: Yes     Partners: Male   Other Topics Concern    Not on file   Social History Narrative    Not on file     Social Determinants of Health     Financial Resource Strain: Not on file   Food Insecurity: No Food Insecurity 02:28 AM    CO2 26 11/01/2023 02:28 AM    BUN 20 11/01/2023 02:28 AM    GFRAA >60 03/13/2020 12:00 PM     Lab Results   Component Value Date/Time    INR 1.3 10/31/2023 09:24 AM       PHYSICAL EXAM     Vitals:    11/01/23 1325   BP: 121/77   Pulse: 88   Resp: 17   Temp:    SpO2: 98%        General:  NAD  Heart:  RRR  Lungs:  NWOB  Neurological:  AAOX3    PLAN   Procedure to be performed:  CT guided bone marrow biopsy    Plan for sedation:  moderate  Mallampati Airway Assessment:  III (soft palate, base of uvula visible)  ASA Classification:  ASA 3 - Patient with moderate systemic disease with functional limitations  Post procedure plan:  observation per protocol  Informed consent:  indication, risks and alternatives of the planned procedure and sedation methods reviewed with the patient / family who agree to proceed  Code status:  Full      Ashutosh Silva Radiology, [facility].

## 2023-11-01 NOTE — PROGRESS NOTES
Nonbillable note  Patient admitted this morning  Agree with admit plan  Patient waiting for IR biopsy  Transfusion to be hold as per heme-onc recommendation  Vitals:    11/01/23 1330   BP: 125/68   Pulse: 90   Resp: 18   Temp:    SpO2: 99%

## 2023-11-01 NOTE — PROGRESS NOTES
End of Shift Note    Bedside shift change report given to Jennifer (oncoming nurse) by Terrance Wilson RN (offgoing nurse). Report included the following information SBAR, MAR, and Recent Results    Shift worked:  days     Shift summary and any significant changes:     Blood transfusion, bone bx     Concerns for physician to address:  Pt is confused about dx and would like more information she can understand     Zone phone for oncoming shift:          Activity:     Number times ambulated in hallways past shift: 0  Number of times OOB to chair past shift: 4    Cardiac:   Cardiac Monitoring: Yes           Access:  Current line(s): PIV     Genitourinary:   Urinary status: voiding    Respiratory:      Chronic home O2 use?: NO  Incentive spirometer at bedside: NO       GI:     Current diet:  ADULT DIET;  Regular  Passing flatus: YES  Tolerating current diet: YES       Pain Management:   Patient states pain is manageable on current regimen: YES    Skin:     Interventions: increase time out of bed    Patient Safety:  Fall Score:    Interventions: stay with me (per policy)       Length of Stay:  Expected LOS: 3  Actual LOS: 1      Terrance Wilson RN

## 2023-11-01 NOTE — PLAN OF CARE
Problem: Chronic Conditions and Co-morbidities  Goal: Patient's chronic conditions and co-morbidity symptoms are monitored and maintained or improved  11/1/2023 1006 by Tyrell Gonsales RN  Outcome: Progressing  Flowsheets (Taken 11/1/2023 0745)  Care Plan - Patient's Chronic Conditions and Co-Morbidity Symptoms are Monitored and Maintained or Improved: Monitor and assess patient's chronic conditions and comorbid symptoms for stability, deterioration, or improvement  11/1/2023 0505 by Ariella Young RN  Outcome: Progressing  Flowsheets (Taken 10/31/2023 1700 by Chevy Ivory RN)  Care Plan - Patient's Chronic Conditions and Co-Morbidity Symptoms are Monitored and Maintained or Improved: Monitor and assess patient's chronic conditions and comorbid symptoms for stability, deterioration, or improvement     Problem: Discharge Planning  Goal: Discharge to home or other facility with appropriate resources  11/1/2023 1006 by Tyrell Gonsales RN  Outcome: Progressing  Flowsheets (Taken 11/1/2023 0745)  Discharge to home or other facility with appropriate resources: Identify barriers to discharge with patient and caregiver  11/1/2023 0505 by Ariella Young RN  Outcome: Progressing  Flowsheets  Taken 10/31/2023 1739 by Chevy Ivory RN  Discharge to home or other facility with appropriate resources: Identify barriers to discharge with patient and caregiver  Taken 10/31/2023 1700 by Chevy Ivory RN  Discharge to home or other facility with appropriate resources: Identify barriers to discharge with patient and caregiver     Problem: Pain  Goal: Verbalizes/displays adequate comfort level or baseline comfort level  11/1/2023 1006 by Chevy Ivory RN  Outcome: Progressing  11/1/2023 0505 by Ariella Young RN  Outcome: Progressing     Problem: Safety - Adult  Goal: Free from fall injury  11/1/2023 1006 by Chevy Ivory RN  Outcome: Progressing  Flowsheets (Taken 11/1/2023

## 2023-11-01 NOTE — PROGRESS NOTES
Name of procedure: Bone Marrow Biopsy    Sedation medications given: Yes    Versed: 5 mg    Fentanyl:  100 mcg    Reversal Agent Used: No    Sedation tolerated: Well    Vital Signs: Stable    Samples sent to lab: Yes    Any complications related to procedure: None    Post Procedure Care Needed/order sets placed in [facility] care. Yes    Patient is at increased fall risk due to medication given. Report given to Primary RN at extension [phone] for continuation of care. SBAR items covered. In NAD and A/O x 4 at time of d/c back to room 2301. Dressing  clean and dry.

## 2023-11-02 ENCOUNTER — APPOINTMENT (OUTPATIENT)
Facility: HOSPITAL | Age: 58
DRG: 812 | End: 2023-11-02
Attending: INTERNAL MEDICINE
Payer: MEDICARE

## 2023-11-02 LAB
ANION GAP SERPL CALC-SCNC: 7 MMOL/L (ref 5–15)
BUN SERPL-MCNC: 18 MG/DL (ref 6–20)
BUN/CREAT SERPL: 22 (ref 12–20)
CALCIUM SERPL-MCNC: 8.6 MG/DL (ref 8.5–10.1)
CHLORIDE SERPL-SCNC: 105 MMOL/L (ref 97–108)
CO2 SERPL-SCNC: 27 MMOL/L (ref 21–32)
COMMENT:: NORMAL
CREAT SERPL-MCNC: 0.83 MG/DL (ref 0.55–1.02)
EKG ATRIAL RATE: 90 BPM
EKG DIAGNOSIS: NORMAL
EKG P AXIS: 73 DEGREES
EKG P-R INTERVAL: 162 MS
EKG Q-T INTERVAL: 365 MS
EKG QRS DURATION: 92 MS
EKG QTC CALCULATION (BAZETT): 447 MS
EKG R AXIS: 69 DEGREES
EKG T AXIS: -12 DEGREES
EKG VENTRICULAR RATE: 90 BPM
ERYTHROCYTE [DISTWIDTH] IN BLOOD BY AUTOMATED COUNT: 19 % (ref 11.5–14.5)
EST. AVERAGE GLUCOSE BLD GHB EST-MCNC: ABNORMAL MG/DL
GLUCOSE SERPL-MCNC: 116 MG/DL (ref 65–100)
HBA1C MFR BLD: <3.8 % (ref 4–5.6)
HCT VFR BLD AUTO: 20.8 % (ref 35–47)
HGB BLD-MCNC: 6.9 G/DL (ref 11.5–16)
HISTORY CHECK: NORMAL
MAGNESIUM SERPL-MCNC: 2.3 MG/DL (ref 1.6–2.4)
MAGNESIUM SERPL-MCNC: 2.5 MG/DL (ref 1.6–2.4)
MCH RBC QN AUTO: 31.9 PG (ref 26–34)
MCHC RBC AUTO-ENTMCNC: 33.2 G/DL (ref 30–36.5)
MCV RBC AUTO: 96.3 FL (ref 80–99)
NRBC # BLD: 0.14 K/UL (ref 0–0.01)
NRBC BLD-RTO: 2.5 PER 100 WBC
PLATELET # BLD AUTO: 69 K/UL (ref 150–400)
PMV BLD AUTO: 11.4 FL (ref 8.9–12.9)
POTASSIUM SERPL-SCNC: 3.9 MMOL/L (ref 3.5–5.1)
RBC # BLD AUTO: 2.16 M/UL (ref 3.8–5.2)
SODIUM SERPL-SCNC: 139 MMOL/L (ref 136–145)
SPECIMEN HOLD: NORMAL
WBC # BLD AUTO: 5.7 K/UL (ref 3.6–11)

## 2023-11-02 PROCEDURE — 85027 COMPLETE CBC AUTOMATED: CPT

## 2023-11-02 PROCEDURE — 6370000000 HC RX 637 (ALT 250 FOR IP): Performed by: NURSE PRACTITIONER

## 2023-11-02 PROCEDURE — 079T3ZX DRAINAGE OF BONE MARROW, PERCUTANEOUS APPROACH, DIAGNOSTIC: ICD-10-PCS | Performed by: STUDENT IN AN ORGANIZED HEALTH CARE EDUCATION/TRAINING PROGRAM

## 2023-11-02 PROCEDURE — 83735 ASSAY OF MAGNESIUM: CPT

## 2023-11-02 PROCEDURE — 36415 COLL VENOUS BLD VENIPUNCTURE: CPT

## 2023-11-02 PROCEDURE — C9113 INJ PANTOPRAZOLE SODIUM, VIA: HCPCS | Performed by: NURSE PRACTITIONER

## 2023-11-02 PROCEDURE — A4216 STERILE WATER/SALINE, 10 ML: HCPCS | Performed by: NURSE PRACTITIONER

## 2023-11-02 PROCEDURE — 2580000003 HC RX 258: Performed by: NURSE PRACTITIONER

## 2023-11-02 PROCEDURE — 86921 COMPATIBILITY TEST INCUBATE: CPT

## 2023-11-02 PROCEDURE — 2500000003 HC RX 250 WO HCPCS: Performed by: NURSE PRACTITIONER

## 2023-11-02 PROCEDURE — 2580000003 HC RX 258: Performed by: INTERNAL MEDICINE

## 2023-11-02 PROCEDURE — 30233N1 TRANSFUSION OF NONAUTOLOGOUS RED BLOOD CELLS INTO PERIPHERAL VEIN, PERCUTANEOUS APPROACH: ICD-10-PCS | Performed by: STUDENT IN AN ORGANIZED HEALTH CARE EDUCATION/TRAINING PROGRAM

## 2023-11-02 PROCEDURE — 07DR3ZX EXTRACTION OF ILIAC BONE MARROW, PERCUTANEOUS APPROACH, DIAGNOSTIC: ICD-10-PCS | Performed by: STUDENT IN AN ORGANIZED HEALTH CARE EDUCATION/TRAINING PROGRAM

## 2023-11-02 PROCEDURE — 6360000002 HC RX W HCPCS: Performed by: NURSE PRACTITIONER

## 2023-11-02 PROCEDURE — 36430 TRANSFUSION BLD/BLD COMPNT: CPT

## 2023-11-02 PROCEDURE — 80048 BASIC METABOLIC PNL TOTAL CA: CPT

## 2023-11-02 PROCEDURE — P9016 RBC LEUKOCYTES REDUCED: HCPCS

## 2023-11-02 PROCEDURE — 86922 COMPATIBILITY TEST ANTIGLOB: CPT

## 2023-11-02 PROCEDURE — 6370000000 HC RX 637 (ALT 250 FOR IP): Performed by: INTERNAL MEDICINE

## 2023-11-02 PROCEDURE — 1100000000 HC RM PRIVATE

## 2023-11-02 RX ORDER — SODIUM CHLORIDE 9 MG/ML
INJECTION, SOLUTION INTRAVENOUS PRN
Status: DISCONTINUED | OUTPATIENT
Start: 2023-11-02 | End: 2023-11-03 | Stop reason: HOSPADM

## 2023-11-02 RX ORDER — METOPROLOL SUCCINATE 25 MG/1
25 TABLET, EXTENDED RELEASE ORAL DAILY
Status: DISCONTINUED | OUTPATIENT
Start: 2023-11-02 | End: 2023-11-03 | Stop reason: HOSPADM

## 2023-11-02 RX ADMIN — SODIUM CHLORIDE, PRESERVATIVE FREE 10 ML: 5 INJECTION INTRAVENOUS at 22:59

## 2023-11-02 RX ADMIN — SODIUM CHLORIDE 40 MG: 9 INJECTION INTRAMUSCULAR; INTRAVENOUS; SUBCUTANEOUS at 10:07

## 2023-11-02 RX ADMIN — ROSUVASTATIN CALCIUM 10 MG: 10 TABLET, COATED ORAL at 22:58

## 2023-11-02 RX ADMIN — EXTENDED PHENYTOIN SODIUM 30 MG: 30 CAPSULE ORAL at 07:49

## 2023-11-02 RX ADMIN — SODIUM CHLORIDE, PRESERVATIVE FREE 10 ML: 5 INJECTION INTRAVENOUS at 10:08

## 2023-11-02 RX ADMIN — Medication 1000 UNITS: at 10:07

## 2023-11-02 RX ADMIN — OXYCODONE 10 MG: 5 TABLET ORAL at 15:29

## 2023-11-02 RX ADMIN — PHENYTOIN SODIUM 300 MG: 100 CAPSULE ORAL at 07:43

## 2023-11-02 RX ADMIN — METOPROLOL SUCCINATE 25 MG: 25 TABLET, EXTENDED RELEASE ORAL at 16:02

## 2023-11-02 RX ADMIN — HYDROMORPHONE HYDROCHLORIDE 0.5 MG: 1 INJECTION, SOLUTION INTRAMUSCULAR; INTRAVENOUS; SUBCUTANEOUS at 10:47

## 2023-11-02 ASSESSMENT — PAIN DESCRIPTION - LOCATION
LOCATION: OTHER (COMMENT)
LOCATION: OTHER (COMMENT)

## 2023-11-02 ASSESSMENT — ENCOUNTER SYMPTOMS
CHEST TIGHTNESS: 0
SHORTNESS OF BREATH: 0
STRIDOR: 0
COUGH: 0
WHEEZING: 0

## 2023-11-02 ASSESSMENT — PAIN SCALES - GENERAL
PAINLEVEL_OUTOF10: 6
PAINLEVEL_OUTOF10: 8

## 2023-11-02 ASSESSMENT — PAIN DESCRIPTION - ORIENTATION
ORIENTATION: LEFT
ORIENTATION: LEFT

## 2023-11-02 ASSESSMENT — PAIN DESCRIPTION - DESCRIPTORS
DESCRIPTORS: SORE;SHARP
DESCRIPTORS: STABBING

## 2023-11-02 NOTE — PROGRESS NOTES
Bedside shift change report given to [facility], RN (oncoming nurse) by Vani Connolly, NOELLE (offgoing nurse). Report included the following information Nurse Handoff Report, Recent Results, Cardiac Rhythm NSR, and Quality Measures. 0833--Shift assessment complete--see flow sheets for details. 1100--Amanda Patel at bedside. Plan is for patient to get 2 units of packed RBCs today. This nurses informed MD about patient having short runs of V-tach per night nurse. Dr. Constance Patel plans to order cardiology consult and echo. 1250--Called blood bank to get update on blood coming from [facility]. Blood bank still waiting for blood. 1438--1st blood transfusion started. End of Shift Note    Bedside shift change report given to Kari [place] (oncoming nurse) by Merlyn Mcpherson RN (offgoing nurse). Report included the following information SBAR, Kardex, Recent Results, Cardiac Rhythm Sinus , and Quality Measures    Shift worked:  Day shift     Shift summary and any significant changes:     2 blood transfusions ordered by MD--1 given and 2nd one started before end of shift. Patient tolerated well. PRN oxycodone given x 1 and PRN Dilaudid given x 1 for left side pain (MD aware). Echo pending completion. Biopsy results pending. Concerns for physician to address:       Zone phone for oncoming shift:   [phone]       Activity:     Number times ambulated in hallways past shift: 0  Number of times OOB to chair past shift: 1    Cardiac:   Cardiac Monitoring: Yes           Access:  Current line(s): PIV     Genitourinary:   Urinary status: voiding    Respiratory:      Chronic home O2 use?: NO  Incentive spirometer at bedside: NO       GI:     Current diet:  ADULT DIET;  Regular  DIET ONE TIME MESSAGE;  Passing flatus: YES  Tolerating current diet: YES       Pain Management:   Patient states pain is manageable on current regimen: YES    Skin:     Interventions: float heels, increase time out of bed, and nutritional support    Patient Safety:  Fall Score:    Interventions: bed/chair alarm, gripper socks, and pt to call before getting OOB       Length of Stay:  Expected LOS: 3  Actual LOS: 2      Bianca Inman RN

## 2023-11-02 NOTE — CONSULTS
[facility]  [address] [place] [address], [address] [place] [address]  [phone]  [email or web address]       CARDIOLOGY CONSULTATION       Date of  Admission: 10/31/2023  5:30 PM     Admission type:Elective   Primary Care Physician:Emilia Rogers PA-C     Attending Provider: Anette Silver MD  Cardiology Provider:     PLEASE NOTE THAT WE CONFIRMED WITH THE REFERRING PHYSICIAN PRIOR TO SEEING THE PATIENT THAT THE PATIENT IS BEING REFERRED [address] EVALUATION AND FOR LONG-TERM ONGOING CARDIAC CARE    CC/REASON FOR CONSULT: VT     Subjective:     Brandy Pichardo is a 59 y.o. female admitted for Anemia [D64.9]. Patient is a 78-year-old female with a past medical history of chronic Micrell proliferative disease, severe anemia, splenomegaly, COPD and additional past medical history as reported below. Cardiology was consulted due to 2 episodes of NSVT that occurred this morning. Telemetry strips reviewed at 0339 and 0610 hrs. Electrolytes normal this morning, troponin negative.  [address]: Mother hx CHF, ?  Arrhythmia hx            Father : Hx atrial fibrillation   Patient Active Problem List    Diagnosis Date Noted    Chronic myeloproliferative disease ([address]) 11/01/2023    Splenomegaly 11/01/2023    Severe anemia 10/31/2023    COPD with acute exacerbation ([address]) 04/24/2023    Upper airway cough syndrome 04/23/2023    Thrombocytosis 04/23/2023      Rika Goss PA-C  Past Medical History:   Diagnosis Date    Asthma     Cerebral artery occlusion with cerebral infarction [facility])     History of stroke without residual deficits     Morbid obesity with BMI of 45.0-49.9, adult ([address]) 04/20/2016    MRSA carrier 09/13/2019    Osteoarthritis of multiple joints     Primary localized osteoarthritis of right hip 02/02/2017    Seizure disorder ([address])     last seizure 2006    Thrombocytosis       Social History     Socioeconomic History    Marital status:    Tobacco Use    Smoking

## 2023-11-03 ENCOUNTER — APPOINTMENT (OUTPATIENT)
Facility: HOSPITAL | Age: 58
DRG: 812 | End: 2023-11-03
Attending: INTERNAL MEDICINE
Payer: MEDICARE

## 2023-11-03 VITALS
WEIGHT: 293 LBS | HEIGHT: 68 IN | SYSTOLIC BLOOD PRESSURE: 122 MMHG | OXYGEN SATURATION: 96 % | HEART RATE: 89 BPM | BODY MASS INDEX: 44.41 KG/M2 | DIASTOLIC BLOOD PRESSURE: 72 MMHG | TEMPERATURE: 99 F | RESPIRATION RATE: 18 BRPM

## 2023-11-03 LAB
ABO + RH BLD: NORMAL
ANION GAP SERPL CALC-SCNC: 7 MMOL/L (ref 5–15)
ANTIGENS PRESENT RBC DONR: NORMAL
BLD PROD TYP BPU: NORMAL
BLOOD BANK CMNT PATIENT-IMP: NORMAL
BLOOD BANK DISPENSE STATUS: NORMAL
BLOOD GROUP ANTIBODIES SERPL: NORMAL
BLOOD GROUP ANTIBODIES SERPL: NORMAL
BPU ID: NORMAL
BUN SERPL-MCNC: 21 MG/DL (ref 6–20)
BUN/CREAT SERPL: 21 (ref 12–20)
CALCIUM SERPL-MCNC: 8.7 MG/DL (ref 8.5–10.1)
CHLORIDE SERPL-SCNC: 105 MMOL/L (ref 97–108)
CO2 SERPL-SCNC: 24 MMOL/L (ref 21–32)
CREAT SERPL-MCNC: 0.98 MG/DL (ref 0.55–1.02)
CROSSMATCH RESULT: NORMAL
ECHO AO ASC DIAM: 3.4 CM
ECHO AO ASCENDING AORTA INDEX: 1.4 CM/M2
ECHO AV AREA PEAK VELOCITY: 2.6 CM2
ECHO AV AREA VTI: 2.8 CM2
ECHO AV AREA/BSA PEAK VELOCITY: 1.1 CM2/M2
ECHO AV AREA/BSA VTI: 1.2 CM2/M2
ECHO AV MEAN GRADIENT: 10 MMHG
ECHO AV MEAN VELOCITY: 1.5 M/S
ECHO AV PEAK GRADIENT: 19 MMHG
ECHO AV PEAK VELOCITY: 2.2 M/S
ECHO AV VELOCITY RATIO: 0.73
ECHO AV VTI: 40.5 CM
ECHO BSA: 2.55 M2
ECHO LA DIAMETER INDEX: 1.9 CM/M2
ECHO LA DIAMETER: 4.6 CM
ECHO LA VOL A-L A2C: 71 ML (ref 22–52)
ECHO LA VOL A-L A2C: 74 ML (ref 22–52)
ECHO LA VOL A-L A4C: 66 ML (ref 22–52)
ECHO LA VOL A-L A4C: 70 ML (ref 22–52)
ECHO LA VOLUME AREA LENGTH: 72 ML
ECHO LA VOLUME INDEX AREA LENGTH: 30 ML/M2 (ref 16–34)
ECHO LV E' LATERAL VELOCITY: 11 CM/S
ECHO LV E' SEPTAL VELOCITY: 9 CM/S
ECHO LV EDV A4C: 121 ML
ECHO LV EDV INDEX A4C: 50 ML/M2
ECHO LV EJECTION FRACTION A4C: 64 %
ECHO LV ESV A4C: 44 ML
ECHO LV ESV INDEX A4C: 18 ML/M2
ECHO LV FRACTIONAL SHORTENING: 30 % (ref 28–44)
ECHO LV INTERNAL DIMENSION DIASTOLE INDEX: 2.64 CM/M2
ECHO LV INTERNAL DIMENSION DIASTOLIC: 6.4 CM (ref 3.9–5.3)
ECHO LV INTERNAL DIMENSION SYSTOLIC INDEX: 1.86 CM/M2
ECHO LV INTERNAL DIMENSION SYSTOLIC: 4.5 CM
ECHO LV IVSD: 1 CM (ref 0.6–0.9)
ECHO LV MASS 2D: 275.6 G (ref 67–162)
ECHO LV MASS INDEX 2D: 113.9 G/M2 (ref 43–95)
ECHO LV POSTERIOR WALL DIASTOLIC: 1 CM (ref 0.6–0.9)
ECHO LV RELATIVE WALL THICKNESS RATIO: 0.31
ECHO LVOT AREA: 3.8 CM2
ECHO LVOT AV VTI INDEX: 0.77
ECHO LVOT DIAM: 2.2 CM
ECHO LVOT MEAN GRADIENT: 6 MMHG
ECHO LVOT PEAK GRADIENT: 10 MMHG
ECHO LVOT PEAK VELOCITY: 1.6 M/S
ECHO LVOT STROKE VOLUME INDEX: 48.7 ML/M2
ECHO LVOT SV: 117.8 ML
ECHO LVOT VTI: 31 CM
ECHO MV A VELOCITY: 0.79 M/S
ECHO MV AREA VTI: 4.3 CM2
ECHO MV E DECELERATION TIME (DT): 251.4 MS
ECHO MV E VELOCITY: 0.91 M/S
ECHO MV E/A RATIO: 1.15
ECHO MV E/E' LATERAL: 8.27
ECHO MV E/E' RATIO (AVERAGED): 9.19
ECHO MV E/E' SEPTAL: 10.11
ECHO MV LVOT VTI INDEX: 0.87
ECHO MV MAX VELOCITY: 1 M/S
ECHO MV MEAN GRADIENT: 2 MMHG
ECHO MV MEAN VELOCITY: 0.7 M/S
ECHO MV PEAK GRADIENT: 4 MMHG
ECHO MV VTI: 27.1 CM
ECHO PULMONARY ARTERY END DIASTOLIC PRESSURE: 4 MMHG
ECHO PV REGURGITANT MAX VELOCITY: 1 M/S
ECHO RV FREE WALL PEAK S': 18 CM/S
ECHO RV TAPSE: 2.6 CM (ref 1.7–?)
ECHO TV REGURGITANT MAX VELOCITY: 3.05 M/S
ECHO TV REGURGITANT PEAK GRADIENT: 37 MMHG
ERYTHROCYTE [DISTWIDTH] IN BLOOD BY AUTOMATED COUNT: 18.6 % (ref 11.5–14.5)
GLUCOSE SERPL-MCNC: 134 MG/DL (ref 65–100)
HCT VFR BLD AUTO: 27.3 % (ref 35–47)
HGB BLD-MCNC: 9 G/DL (ref 11.5–16)
MCH RBC QN AUTO: 31 PG (ref 26–34)
MCHC RBC AUTO-ENTMCNC: 33 G/DL (ref 30–36.5)
MCV RBC AUTO: 94.1 FL (ref 80–99)
NRBC # BLD: 0.18 K/UL (ref 0–0.01)
NRBC BLD-RTO: 2.6 PER 100 WBC
PLATELET # BLD AUTO: 76 K/UL (ref 150–400)
PMV BLD AUTO: 11.3 FL (ref 8.9–12.9)
POTASSIUM SERPL-SCNC: 3.8 MMOL/L (ref 3.5–5.1)
RBC # BLD AUTO: 2.9 M/UL (ref 3.8–5.2)
SODIUM SERPL-SCNC: 136 MMOL/L (ref 136–145)
SPECIMEN EXP DATE BLD: NORMAL
UNIT DIVISION: 0
WBC # BLD AUTO: 7 K/UL (ref 3.6–11)

## 2023-11-03 PROCEDURE — 93306 TTE W/DOPPLER COMPLETE: CPT

## 2023-11-03 PROCEDURE — 36415 COLL VENOUS BLD VENIPUNCTURE: CPT

## 2023-11-03 PROCEDURE — 2580000003 HC RX 258: Performed by: NURSE PRACTITIONER

## 2023-11-03 PROCEDURE — 85027 COMPLETE CBC AUTOMATED: CPT

## 2023-11-03 PROCEDURE — A4216 STERILE WATER/SALINE, 10 ML: HCPCS | Performed by: NURSE PRACTITIONER

## 2023-11-03 PROCEDURE — C9113 INJ PANTOPRAZOLE SODIUM, VIA: HCPCS | Performed by: NURSE PRACTITIONER

## 2023-11-03 PROCEDURE — 2580000003 HC RX 258: Performed by: INTERNAL MEDICINE

## 2023-11-03 PROCEDURE — 6360000002 HC RX W HCPCS: Performed by: NURSE PRACTITIONER

## 2023-11-03 PROCEDURE — 6370000000 HC RX 637 (ALT 250 FOR IP): Performed by: NURSE PRACTITIONER

## 2023-11-03 PROCEDURE — 6370000000 HC RX 637 (ALT 250 FOR IP): Performed by: INTERNAL MEDICINE

## 2023-11-03 PROCEDURE — 80048 BASIC METABOLIC PNL TOTAL CA: CPT

## 2023-11-03 RX ORDER — METOPROLOL SUCCINATE 25 MG/1
25 TABLET, EXTENDED RELEASE ORAL DAILY
Qty: 30 TABLET | Refills: 0 | Status: SHIPPED | OUTPATIENT
Start: 2023-11-04

## 2023-11-03 RX ADMIN — METOPROLOL SUCCINATE 25 MG: 25 TABLET, EXTENDED RELEASE ORAL at 09:39

## 2023-11-03 RX ADMIN — SODIUM CHLORIDE 40 MG: 9 INJECTION INTRAMUSCULAR; INTRAVENOUS; SUBCUTANEOUS at 09:39

## 2023-11-03 RX ADMIN — EXTENDED PHENYTOIN SODIUM 30 MG: 30 CAPSULE ORAL at 07:45

## 2023-11-03 RX ADMIN — OXYCODONE 10 MG: 5 TABLET ORAL at 10:54

## 2023-11-03 RX ADMIN — PHENYTOIN SODIUM 300 MG: 100 CAPSULE ORAL at 07:42

## 2023-11-03 RX ADMIN — SODIUM CHLORIDE, PRESERVATIVE FREE 10 ML: 5 INJECTION INTRAVENOUS at 09:39

## 2023-11-03 RX ADMIN — Medication 1000 UNITS: at 09:39

## 2023-11-03 ASSESSMENT — PAIN DESCRIPTION - DESCRIPTORS: DESCRIPTORS: STABBING

## 2023-11-03 ASSESSMENT — PAIN SCALES - GENERAL
PAINLEVEL_OUTOF10: 0
PAINLEVEL_OUTOF10: 2
PAINLEVEL_OUTOF10: 6

## 2023-11-03 ASSESSMENT — PAIN DESCRIPTION - ORIENTATION: ORIENTATION: LEFT

## 2023-11-03 ASSESSMENT — PAIN DESCRIPTION - LOCATION: LOCATION: ABDOMEN

## 2023-11-03 NOTE — PLAN OF CARE
Problem: Chronic Conditions and Co-morbidities  Goal: Patient's chronic conditions and co-morbidity symptoms are monitored and maintained or improved  11/2/2023 2245 by Rose Mcclelland RN  Outcome: Progressing  Flowsheets (Taken 11/1/2023 0745 by Scott Thibodeaux RN)  Care Plan - Patient's Chronic Conditions and Co-Morbidity Symptoms are Monitored and Maintained or Improved: Monitor and assess patient's chronic conditions and comorbid symptoms for stability, deterioration, or improvement  11/2/2023 1024 by María Elena Chávez RN  Outcome: Progressing     Problem: Discharge Planning  Goal: Discharge to home or other facility with appropriate resources  11/2/2023 2245 by Rose Mcclelland RN  Outcome: Progressing  11/2/2023 1024 by María Elena Chávez RN  Outcome: Progressing     Problem: Pain  Goal: Verbalizes/displays adequate comfort level or baseline comfort level  11/2/2023 2245 by Rose Mcclelland RN  Outcome: Progressing  11/2/2023 1024 by María Elena Chávez RN  Outcome: Progressing     Problem: Safety - Adult  Goal: Free from fall injury  11/2/2023 2245 by Rose Mcclelland RN  Outcome: Progressing  11/2/2023 1024 by María Elena Chávez RN  Outcome: Progressing

## 2023-11-03 NOTE — PROGRESS NOTES
[facility]  [address] [place] [address], [address] [place], [place] [address]  [phone]  [email or web address]      ECHO normal. Continue betablocker. Needs out patient sleep study. Pt plans to follow up with a cardiologist in [facility] as she lives in [place]. Advised to follow up with PCP as well to arrange JUAN evaluation. No further recommendations from cardiology will sign off.      Gulshan Tinoco DNP, ANP-BC

## 2023-11-03 NOTE — PROGRESS NOTES
Bedside and Verbal shift change report given to Bryson Multani RN (oncoming nurse) by Adrian Leonardo RN (offgoing nurse). Report included the following information Nurse Handoff Report, Intake/Output, MAR, Recent Results, and Cardiac Rhythm SR .     7:51AM- Transferred out of unit for Echo via stretcher. 4:50PM- Discharge order carried out. Discharge instruction provided and explained to the patient. All questions answered and no further questions asked. Confirmed with Dr. Kayla Owens re: Aspirin and Symbicort order on discharge instruction as patient reported that Dr. Yesenia Gomez told her to hold Aspirin when she got sick last week and that she hasn't used Symbicort for a month now. Dr. Kayla Owens ordered to hold off on those medications as instructed, patient informed and verbalized understanding. Discharge instruction copy signed and attached to the chart. Awaiting transportation. 5:40PM- Patient's  arrived to  patient. PIV lines removed and telemetry monitoring discontinued, tolerated well. Patient left the unit with all her belongings via wheelchair. Patient left the hospital accompanied by her  via their personal car. Patient is stable on final assessment. Goals met, discharged to home.

## 2023-11-03 NOTE — CARE COORDINATION
Transition of Care Plan:    RUR: 14% (Moderate RUR)  Prior Level of Functioning: Independent in ADLs/IADLs  Disposition: Home with follow up appointment   If SNF or IPR: Date FOC offered: na  Date FOC received:   Accepting facility:   Date authorization started with reference number:   Date authorization received and expires: Follow up appointments: [address] was contacted  DME needed: Denies needs; has Cane;Walker, rolling;Crutches (Built in shower bench)  Transportation at discharge: Royer Porter (Spouse) [phone]   IM/IMM Medicare/ letter given: 2nd IM 11/03  Is patient a  and connected with [place]? na   If yes, was [employer] transfer form completed and [place] notified? Caregiver Contact: Royer Porter (Spouse) [phone]   Discharge Caregiver contacted prior to discharge? Will be contacted  Care Conference needed? no  Barriers to discharge: Hematology clearance     The patient lives with Royer Porter (Spouse) [phone] with in a two story home with four steps to enter with railing. She has Cane;Walker, rolling;Crutches (Built in shower bench). CM met the patient and Dr. Padilla Chin who both requested to contact [address] [place] [address] for possible need of blood transfusion for next week. CM contacted the center and spoke to Mei Emerson who stated that the patient may come Tuesday for cross and match and the blood transfusion could be done on Wednesday if needed. Also, she stated H&P and blood transfusion consent is required.  The patient was informed and given a copy of H&P; Mei Emerson can be contacted at [address] [place] [address]  [phone]  Was added to AVS       11/03/23 1213   Services At/After Discharge   Transition of Care Consult (CM Consult) [address] [place]  ([facility] [address])   [address] Discharge Outpatient  ([address] [place] [address]) Mode of Transport at Discharge Other (see comment)  (Marti Ramirez (Spouse) [phone])   Confirm Follow Up Transport Self         Sophia Perez RN  Case Management  [phone]

## 2023-11-03 NOTE — PROGRESS NOTES
Short nsvt over night. Nl lvef per echo. Morbidly obese. Would recommend outpt sleep study. Keep K.4, mg>2. Thank you for allowing me to participate in this patients care.     Jose Enrique Meneses MD, José Miguel Hyman

## 2023-11-06 ENCOUNTER — TELEPHONE (OUTPATIENT)
Age: 58
End: 2023-11-06

## 2023-11-06 NOTE — TELEPHONE ENCOUNTER
Please call pt and see if she will do a virtual at 11:45am Wednesday. She can call her PCP for her lab results but from what we see she does NOT need transfusion.

## 2023-11-08 ENCOUNTER — TELEMEDICINE (OUTPATIENT)
Age: 58
End: 2023-11-08
Payer: MEDICARE

## 2023-11-08 ENCOUNTER — TELEPHONE (OUTPATIENT)
Age: 58
End: 2023-11-08

## 2023-11-08 DIAGNOSIS — D75.81 SECONDARY MYELOFIBROSIS (HCC): ICD-10-CM

## 2023-11-08 DIAGNOSIS — D61.89 ANEMIA DUE TO OTHER BONE MARROW FAILURE (HCC): ICD-10-CM

## 2023-11-08 DIAGNOSIS — D46.9 MYELODYSPLASTIC SYNDROME (HCC): Primary | ICD-10-CM

## 2023-11-08 PROCEDURE — 99215 OFFICE O/P EST HI 40 MIN: CPT | Performed by: INTERNAL MEDICINE

## 2023-11-08 NOTE — PROGRESS NOTES
[facility]   at [address] [place], [address], [facility], [address] [place] [address] Ave   [phone]             Progress Note          Patient: Brayden Elizabeth  MRN: [id number]   SSN: [id number]    YOB: 1965                Reason for Visit:   Husam Edwards 59 y.o. female White (non-)     who is seen by synchronous (real-time) audio-video technology for follow up of    Diagnosis Orders   1. Myelodysplastic syndrome ([address])        2. Secondary myelofibrosis (HCC)             Subjective:        Brayden Elizabeth is a 60 y.o.  female who I have been seeing for Essential Thrombocytosis for the last few yrs. Several yrs ago she was admitted with  subacute difficulties with visual processing, cognitive impairment, slurred speech, dizziness, headaches x 2-3 weeks. MRI Brain this admission reveals restricted diffusion and enhancement of the R parieto-occipital and R frontal territories with rather  confluent T2 hyperintensity in a similar distribution on FLAIR. CTA H/N did not reveal any significant stenosis of LVO. She was noted to have thrombocytosis and on testing we found a JAK2 mutation. She has been taking Hydroxyurea. She recently developed severe anemia and thrombocytopenia and severe fatigue. She is now off Hydrea. A repeat bone marrow reveals a dual diagnosis of Myelodysplasia and Myelofibrosis. She has been feeling poorly with no energy. She is receiving RBC transfusion periodically.           Review of Systems:      Constitutional: excessive fatigue   Eyes: negative   Ears, Nose, Mouth, Throat, and Face: negative   Respiratory: negative   Cardiovascular: negative   Gastrointestinal: negative   Genitourinary:negative   Integument/Breast: negative   Hematologic/Lymphatic: negative   Musculoskeletal:negative   Neurological: negative          Past Medical History:   Diagnosis Date    Asthma     Cerebral artery occlusion with

## 2023-11-08 NOTE — TELEPHONE ENCOUNTER
[employer]  Oncology Social Work  Encounter     Patient Name:  Steph Wilson     Medical History:     Advance Directives:     Narrative: MD stated pt needs to be seen in person in the future.      Barriers to Care:     Plan:     Referral/Handouts:

## 2023-11-08 NOTE — PROGRESS NOTES
Rama Campa is a 59 y.o. female here for virtual visit follow up appt for recent hospital visit. Pt states she has been weak since hospital visit but a lot better than she was.  with her on call. 1. Have you been to the ER, urgent care clinic since your last visit? Hospitalized since your last visit? 10/31/23 - 11/3/23 fatigue/SOB - anemia/lung nodule    2. Have you seen or consulted any other health care providers outside of the [address] [place] [address] since your last visit? Include any pap smears or colon screening.  no

## 2023-11-10 ENCOUNTER — CLINICAL DOCUMENTATION (OUTPATIENT)
Age: 58
End: 2023-11-10

## 2023-11-10 DIAGNOSIS — D75.81 SECONDARY MYELOFIBROSIS (HCC): ICD-10-CM

## 2023-11-10 DIAGNOSIS — D46.9 MYELODYSPLASTIC SYNDROME (HCC): Primary | ICD-10-CM

## 2023-11-10 NOTE — PROGRESS NOTES
QUE FROM DR Mondragon Bears REFERRAL TO DR Clarisa Butcher [facility] AND NIKOLE SEGAL U FOR BONE MARROW TRANSPLANT

## 2023-11-13 ENCOUNTER — TELEPHONE (OUTPATIENT)
Age: 58
End: 2023-11-13

## 2023-11-13 NOTE — TELEPHONE ENCOUNTER
Called the patient. Plan to transfuse 1 unit of blood. Cross match and transfuse as early as possible.

## 2023-11-13 NOTE — TELEPHONE ENCOUNTER
Called because she pt is in their hospital stated that she has a hemoglobin of 7.7, her platelets are at a 50, she has a cough, pt is very weak, and is short of breath. She is asking for a call back.

## 2023-11-14 ENCOUNTER — HOSPITAL ENCOUNTER (OUTPATIENT)
Facility: HOSPITAL | Age: 58
Discharge: HOME | End: 2023-11-17
Payer: MEDICARE

## 2023-11-14 DIAGNOSIS — D47.1 CHRONIC MYELOSIS (HCC): ICD-10-CM

## 2023-11-14 PROCEDURE — 86920 COMPATIBILITY TEST SPIN: CPT

## 2023-11-14 PROCEDURE — 86902 BLOOD TYPE ANTIGEN DONOR EA: CPT

## 2023-11-14 PROCEDURE — 86850 RBC ANTIBODY SCREEN: CPT

## 2023-11-14 PROCEDURE — 86922 COMPATIBILITY TEST ANTIGLOB: CPT

## 2023-11-14 PROCEDURE — 86921 COMPATIBILITY TEST INCUBATE: CPT

## 2023-11-14 PROCEDURE — 86870 RBC ANTIBODY IDENTIFICATION: CPT

## 2023-11-14 PROCEDURE — 86900 BLOOD TYPING SEROLOGIC ABO: CPT

## 2023-11-14 PROCEDURE — 86901 BLOOD TYPING SEROLOGIC RH(D): CPT

## 2023-11-14 PROCEDURE — 36415 COLL VENOUS BLD VENIPUNCTURE: CPT

## 2023-11-14 NOTE — PROGRESS NOTES
Pt is going to see U doc 11/22. Please let pt know MD is out a few days and earliest we could see her is 11/29/23 at 315pm.    Please call pt and add to our schedule for follow up/discuss plan.

## 2023-11-15 ENCOUNTER — HOSPITAL ENCOUNTER (OUTPATIENT)
Facility: HOSPITAL | Age: 58
Setting detail: INFUSION SERIES
Discharge: HOME | End: 2023-11-15
Payer: MEDICARE

## 2023-11-15 VITALS
OXYGEN SATURATION: 97 % | DIASTOLIC BLOOD PRESSURE: 72 MMHG | HEART RATE: 75 BPM | TEMPERATURE: 96.8 F | SYSTOLIC BLOOD PRESSURE: 116 MMHG | RESPIRATION RATE: 20 BRPM

## 2023-11-15 LAB
ERYTHROCYTE [DISTWIDTH] IN BLOOD BY AUTOMATED COUNT: 17.3 % (ref 11.5–14.5)
HCT VFR BLD AUTO: 19.3 % (ref 35–47)
HGB BLD-MCNC: 6.3 G/DL (ref 11.5–16)
MCH RBC QN AUTO: 30.6 PG (ref 26–34)
MCHC RBC AUTO-ENTMCNC: 32.6 G/DL (ref 30–36.5)
MCV RBC AUTO: 93.7 FL (ref 80–99)
NRBC # BLD: 0.09 K/UL (ref 0–0.01)
NRBC BLD-RTO: 1.2 PER 100 WBC
PLATELET # BLD AUTO: 46 K/UL (ref 150–400)
PMV BLD AUTO: 11.9 FL (ref 8.9–12.9)
RBC # BLD AUTO: 2.06 M/UL (ref 3.8–5.2)
WBC # BLD AUTO: 7.5 K/UL (ref 3.6–11)

## 2023-11-15 PROCEDURE — P9016 RBC LEUKOCYTES REDUCED: HCPCS

## 2023-11-15 PROCEDURE — 6370000000 HC RX 637 (ALT 250 FOR IP): Performed by: INTERNAL MEDICINE

## 2023-11-15 PROCEDURE — 36415 COLL VENOUS BLD VENIPUNCTURE: CPT

## 2023-11-15 PROCEDURE — 36430 TRANSFUSION BLD/BLD COMPNT: CPT

## 2023-11-15 PROCEDURE — 2580000003 HC RX 258: Performed by: INTERNAL MEDICINE

## 2023-11-15 PROCEDURE — 85027 COMPLETE CBC AUTOMATED: CPT

## 2023-11-15 RX ORDER — SODIUM CHLORIDE 9 MG/ML
INJECTION, SOLUTION INTRAVENOUS PRN
Status: COMPLETED | OUTPATIENT
Start: 2023-11-15 | End: 2023-11-15

## 2023-11-15 RX ORDER — DIPHENHYDRAMINE HCL 25 MG
25 TABLET ORAL SEE ADMIN INSTRUCTIONS
Status: COMPLETED | OUTPATIENT
Start: 2023-11-15 | End: 2023-11-15

## 2023-11-15 RX ORDER — ACETAMINOPHEN 325 MG/1
650 TABLET ORAL SEE ADMIN INSTRUCTIONS
Status: COMPLETED | OUTPATIENT
Start: 2023-11-15 | End: 2023-11-15

## 2023-11-15 RX ADMIN — ACETAMINOPHEN 650 MG: 325 TABLET, FILM COATED ORAL at 09:12

## 2023-11-15 RX ADMIN — DIPHENHYDRAMINE HYDROCHLORIDE 25 MG: 25 TABLET ORAL at 09:13

## 2023-11-15 RX ADMIN — SODIUM CHLORIDE: 9 INJECTION, SOLUTION INTRAVENOUS at 08:40

## 2023-11-15 ASSESSMENT — PAIN DESCRIPTION - DESCRIPTORS
DESCRIPTORS: SHARP
DESCRIPTORS: SHARP

## 2023-11-15 ASSESSMENT — PAIN DESCRIPTION - LOCATION: LOCATION: ABDOMEN

## 2023-11-15 ASSESSMENT — PAIN SCALES - GENERAL
PAINLEVEL_OUTOF10: 3
PAINLEVEL_OUTOF10: 3

## 2023-11-15 NOTE — PROGRESS NOTES
Hemoglobin reported as 6.3 today and hematocrit 19.3, Platelet count 46.  Will proceed as ordered with blood transfusion today (awaiting 30 minute time frame from pre-medications as ordered of Tylenol 650 mg and Benadryl 25 mg) see MAR

## 2023-11-15 NOTE — PROGRESS NOTES
Packed red blood cells continue to infuse without reactions, vital signs stable, no distress noted, reports \" feeling hot\", afebrile, patient continuously eating ice chips and drinking water and pepsi, denies nausea, respirations even and unlabored, call bell in reach.

## 2023-11-15 NOTE — PROGRESS NOTES
1 hour post transfusion vitals stable,within normal limits, no distress noted, no reactions to transfusion, skin warm and dry, respirations even and unlabored. Verbalizes understanding of After Visit Summary reviewed, allowed patient to have questions verbalized and answered, Understands follow-up care and appointments as ordered.

## 2023-11-15 NOTE — PROGRESS NOTES
Discharged via wheelchair with all belongings taken to follow-up as ordered verbalizes understanding. To return next week for repeat lab work as ordered.

## 2023-11-15 NOTE — PROGRESS NOTES
Blood transfusion rate increased to 200 ml/hr tolerating well, respirations even and unlabored, no distress noted.  Vital signs stable

## 2023-11-15 NOTE — PROGRESS NOTES
Received to outpatient room #6 for blood transfusion as ordered today, patient arrived via wheelchair, reports weakness, shortness of breath, tired.

## 2023-11-15 NOTE — PROGRESS NOTES
Blood transfusion completed, no reactions noted, no distress noted, normal saline started to flush line vital signs stable. no complaints

## 2023-11-15 NOTE — PROGRESS NOTES
Consent signed for blood transfusion as patient agrees with plan of treatment. Normal saline started to flush IV line.

## 2023-11-15 NOTE — PROGRESS NOTES
Blood transfusion started at 50 ml/hr to infuse, vital signs stable, no reactions noted, respirations even and unlabored, no distress noted, skin warm and dry. Resting well on stretcher with call bell in reach.

## 2023-11-15 NOTE — PROGRESS NOTES
Tylenol 650 mg and Benadryl 25 mg given by mouth as pre-medication prior to blood transfusion as ordered. Tolerating peanut butter crackers, pepsi and water and ice chips well, call bell in reach, side rail elevated on stretcher.

## 2023-11-17 LAB
ABO + RH BLD: NORMAL
ANTIGENS PRESENT RBC DONR: NORMAL
BLD PROD TYP BPU: NORMAL
BLOOD BANK CMNT PATIENT-IMP: NORMAL
BLOOD BANK DISPENSE STATUS: NORMAL
BLOOD GROUP ANTIBODIES SERPL: NORMAL
BLOOD GROUP ANTIBODIES SERPL: POSITIVE
BPU ID: NORMAL
CROSSMATCH RESULT: NORMAL
SPECIMEN EXP DATE BLD: NORMAL
TRANSFUSION STATUS PATIENT QL: NORMAL
UNIT DIVISION: 0

## 2023-11-20 ENCOUNTER — HOSPITAL ENCOUNTER (OUTPATIENT)
Facility: HOSPITAL | Age: 58
Discharge: HOME | End: 2023-11-23
Payer: MEDICARE

## 2023-11-20 ENCOUNTER — CLINICAL DOCUMENTATION (OUTPATIENT)
Age: 58
End: 2023-11-20

## 2023-11-20 DIAGNOSIS — D47.1 CHRONIC MYELOSIS (HCC): ICD-10-CM

## 2023-11-20 LAB
BASOPHILS # BLD: 0 K/UL (ref 0–0.1)
BASOPHILS NFR BLD: 0 % (ref 0–1)
DIFFERENTIAL METHOD BLD: ABNORMAL
EOSINOPHIL # BLD: 0.3 K/UL (ref 0–0.4)
EOSINOPHIL NFR BLD: 5 % (ref 0–7)
ERYTHROCYTE [DISTWIDTH] IN BLOOD BY AUTOMATED COUNT: 16.9 % (ref 11.5–14.5)
HCT VFR BLD AUTO: 22 % (ref 35–47)
HGB BLD-MCNC: 7.3 G/DL (ref 11.5–16)
IMM GRANULOCYTES # BLD AUTO: 0 K/UL
IMM GRANULOCYTES NFR BLD AUTO: 0 %
LYMPHOCYTES # BLD: 1 K/UL (ref 0.8–3.5)
LYMPHOCYTES NFR BLD: 16 % (ref 12–49)
MCH RBC QN AUTO: 31.5 PG (ref 26–34)
MCHC RBC AUTO-ENTMCNC: 33.2 G/DL (ref 30–36.5)
MCV RBC AUTO: 94.8 FL (ref 80–99)
MONOCYTES # BLD: 1.3 K/UL (ref 0–1)
MONOCYTES NFR BLD: 21 % (ref 5–13)
NEUTS BAND NFR BLD MANUAL: 3 % (ref 0–6)
NEUTS SEG # BLD: 3.7 K/UL (ref 1.8–8)
NEUTS SEG NFR BLD: 55 % (ref 32–75)
NRBC # BLD: 0.04 K/UL (ref 0–0.01)
NRBC BLD-RTO: 0.6 PER 100 WBC
PLATELET # BLD AUTO: 40 K/UL (ref 150–400)
PMV BLD AUTO: 11.4 FL (ref 8.9–12.9)
RBC # BLD AUTO: 2.32 M/UL (ref 3.8–5.2)
RBC MORPH BLD: ABNORMAL
RBC MORPH BLD: ABNORMAL
WBC # BLD AUTO: 6.3 K/UL (ref 3.6–11)

## 2023-11-20 PROCEDURE — 86900 BLOOD TYPING SEROLOGIC ABO: CPT

## 2023-11-20 PROCEDURE — 86870 RBC ANTIBODY IDENTIFICATION: CPT

## 2023-11-20 PROCEDURE — 86901 BLOOD TYPING SEROLOGIC RH(D): CPT

## 2023-11-20 PROCEDURE — 86921 COMPATIBILITY TEST INCUBATE: CPT

## 2023-11-20 PROCEDURE — 36415 COLL VENOUS BLD VENIPUNCTURE: CPT

## 2023-11-20 PROCEDURE — 86922 COMPATIBILITY TEST ANTIGLOB: CPT

## 2023-11-20 PROCEDURE — 86880 COOMBS TEST DIRECT: CPT

## 2023-11-20 PROCEDURE — 86920 COMPATIBILITY TEST SPIN: CPT

## 2023-11-20 PROCEDURE — 85025 COMPLETE CBC W/AUTO DIFF WBC: CPT

## 2023-11-20 PROCEDURE — 86850 RBC ANTIBODY SCREEN: CPT

## 2023-11-20 NOTE — PROGRESS NOTES
Brady Velarde called to notify of hgb being 7.3 and pt symptomatic. Per  we will give 2 units. Order faxed, will be administered tomorrow since pt has antibodies.

## 2023-11-21 ENCOUNTER — HOSPITAL ENCOUNTER (OUTPATIENT)
Facility: HOSPITAL | Age: 58
Setting detail: INFUSION SERIES
End: 2023-11-21
Payer: MEDICARE

## 2023-11-21 ENCOUNTER — HOSPITAL ENCOUNTER (OUTPATIENT)
Facility: HOSPITAL | Age: 58
Setting detail: INFUSION SERIES
Discharge: HOME | End: 2023-11-21
Payer: MEDICARE

## 2023-11-21 ENCOUNTER — TELEPHONE (OUTPATIENT)
Age: 58
End: 2023-11-21

## 2023-11-21 VITALS
SYSTOLIC BLOOD PRESSURE: 108 MMHG | OXYGEN SATURATION: 97 % | TEMPERATURE: 97.5 F | RESPIRATION RATE: 20 BRPM | DIASTOLIC BLOOD PRESSURE: 62 MMHG | HEART RATE: 80 BPM

## 2023-11-21 LAB
HCT VFR BLD AUTO: 20.3 % (ref 35–47)
HGB BLD-MCNC: 6.8 G/DL (ref 11.5–16)

## 2023-11-21 PROCEDURE — 85018 HEMOGLOBIN: CPT

## 2023-11-21 PROCEDURE — 86902 BLOOD TYPE ANTIGEN DONOR EA: CPT

## 2023-11-21 PROCEDURE — 6370000000 HC RX 637 (ALT 250 FOR IP): Performed by: INTERNAL MEDICINE

## 2023-11-21 PROCEDURE — 2580000003 HC RX 258: Performed by: INTERNAL MEDICINE

## 2023-11-21 PROCEDURE — 36430 TRANSFUSION BLD/BLD COMPNT: CPT

## 2023-11-21 PROCEDURE — P9016 RBC LEUKOCYTES REDUCED: HCPCS

## 2023-11-21 PROCEDURE — 36415 COLL VENOUS BLD VENIPUNCTURE: CPT

## 2023-11-21 PROCEDURE — 85014 HEMATOCRIT: CPT

## 2023-11-21 RX ORDER — ACETAMINOPHEN 325 MG/1
650 TABLET ORAL SEE ADMIN INSTRUCTIONS
Status: COMPLETED | OUTPATIENT
Start: 2023-11-21 | End: 2023-11-21

## 2023-11-21 RX ORDER — DIPHENHYDRAMINE HCL 25 MG
25 TABLET ORAL SEE ADMIN INSTRUCTIONS
Status: COMPLETED | OUTPATIENT
Start: 2023-11-21 | End: 2023-11-21

## 2023-11-21 RX ORDER — SODIUM CHLORIDE 9 MG/ML
INJECTION, SOLUTION INTRAVENOUS PRN
Status: COMPLETED | OUTPATIENT
Start: 2023-11-21 | End: 2023-11-21

## 2023-11-21 RX ADMIN — ACETAMINOPHEN 650 MG: 325 TABLET, FILM COATED ORAL at 09:14

## 2023-11-21 RX ADMIN — DIPHENHYDRAMINE HYDROCHLORIDE 25 MG: 25 TABLET ORAL at 09:16

## 2023-11-21 RX ADMIN — SODIUM CHLORIDE: 9 INJECTION, SOLUTION INTRAVENOUS at 09:00

## 2023-11-21 ASSESSMENT — PAIN SCALES - GENERAL
PAINLEVEL_OUTOF10: 6
PAINLEVEL_OUTOF10: 0

## 2023-11-21 ASSESSMENT — PAIN DESCRIPTION - ORIENTATION: ORIENTATION: LEFT

## 2023-11-21 ASSESSMENT — PAIN DESCRIPTION - DESCRIPTORS: DESCRIPTORS: STABBING

## 2023-11-21 ASSESSMENT — PAIN DESCRIPTION - LOCATION: LOCATION: ABDOMEN

## 2023-11-21 NOTE — PROGRESS NOTES
Blood infusing without reactions Infusion rate increased to 100 ml/hr, respirations even and unlabored, no distress, no reactions noted, call bell in reach.

## 2023-11-21 NOTE — PROGRESS NOTES
Received to room outpatient #7 for blood transfusion as ordered for today, reported nausea this morning while trying to brush teeth, reports feeling weak, tired, shortness of breath on exert. Agrees with plan of care. Made comfortable on stretcher, call bell in reach. Head of bed elevated, denies any complaints of pain at present.  Vitals signs stable see flow-sheets

## 2023-11-21 NOTE — PROGRESS NOTES
2nd unit of packed red blood cells completed without reactions noted, no distress noted. Normal saline started to flush line. Respirations even and unlabored, denies chest pain or any adverse reactions.

## 2023-11-21 NOTE — PROGRESS NOTES
1st unit Packed RBC's completed without reactions, normal saline started to flush line, tolerating well, respirations even and unlabored, no distress noted, no complaints voiced.  Lab phlebotomist notified to draw 1 hour post transfusion H&H at 1310 and entered in orders,

## 2023-11-21 NOTE — PROGRESS NOTES
Discharged via wheelchair with all belongings taken with , dressing dry and intact on right antecubital area. Patient and  Verbalize understanding of follow-up care.

## 2023-11-21 NOTE — PROGRESS NOTES
Assisted per nurse up to bathroom to void. Tolerated well, states \" I just feel so tired/weak\". Assisted back to stretcher with call bell in reach.

## 2023-11-21 NOTE — PROGRESS NOTES
Blood continues to infuse without reactions, transfusion rate increased to 200 ml/hr. Tolerating po diet and liquids well, no distress noted.

## 2023-11-21 NOTE — PROGRESS NOTES
Vital signs stable, 2nd unit of packed omar blood cells infusing without reactions, no distress noted, no complaints voiced.  Transfusion rate increased to 150 ml/hr

## 2023-11-21 NOTE — PROGRESS NOTES
1 hour post vital signs stable, no transfusion reactions noted. Reviewed possible post transfusion reaction and patient verbalizes understanding of information and follow-up care. Respirations even and unalored, no distress, denies pain or nausea.

## 2023-11-21 NOTE — PROGRESS NOTES
Packed RBC 's infusing well, no reactions noted, no distress noted, respirations even and unlabored, denies pain at present, Resting with eyes closed napping at times. Infusion rate of blood increased to 150 ml/hr.  Tolerating well

## 2023-11-21 NOTE — PROGRESS NOTES
Pressure dressing applied to right antecubital site where IV discontinued secondary to blood seeping through bandage.  Site within normal limits and pressure held for 5 minutes, no further bleeding noted from site, dressing dry and intact

## 2023-11-21 NOTE — PROGRESS NOTES
Pre medications of Tylenol 650 mg given at 0914 po as ordered, patient reports left sided abdominal pain \"6\" on scale stabbing sensation noted. Benadryl 25 mg given po at 0916 premedication prior to blood transfusion as ordered. Tolerating po well of pepsi, peanut butter crackers and ice water, no acute distress noted.

## 2023-11-21 NOTE — PROGRESS NOTES
Blood infusing well without reactions, no distress noted, respirations even and unlabored, transfusion rate increased to 100 ml/hr.

## 2023-11-21 NOTE — PROGRESS NOTES
IV restarted # 22 Right antecubital on 1st attempt, blood resumed at 50 ml/hr, no reactions noted, respirations even and unlabored. Blood infusing without difficulty. Denies any pain at present, Napping at times, resting with eyes closed without distress noted.

## 2023-11-21 NOTE — DISCHARGE INSTRUCTIONS
Follow-up with weekly for CBC with diff as ordered per Dr Mayra Hayes and follow-up for transfusions as directed.

## 2023-11-21 NOTE — PROGRESS NOTES
Assisted by nurse up to bathroom to void tolerated well, reports tiredness and weakness, dyspnea on exert, blood continues to infuse without reactions, no acute distress.

## 2023-11-21 NOTE — TELEPHONE ENCOUNTER
Called because she need to discuss pt blood transfusion. Stated she needs a call as soon as possible.

## 2023-11-21 NOTE — PROGRESS NOTES
Reviewed After Visit Summary, patient and  verbalizes understanding of post -transfusion instructions and follow up care for CBC with diff weekly per Dr. Rashmi Ricci orders and transfuse as needed and to report to Emergency department if increased dyspnea or chest pain, Has appointment tomorrow previously scheduled to see bone marrow doctor to review results from bone marrow biopsy.

## 2023-11-21 NOTE — PROGRESS NOTES
IV started # 22 Left antecubital on 1st attempt, site flushes well, no signs of redness or edema. Transparent dressing dry and intact.

## 2023-11-21 NOTE — PROGRESS NOTES
2nd unit packed red blood cells started to at 50 ml/hr to infuse, no reactions noted, no distress noted, no complaints voiced, skin warm and dry, respirations even and unlabored. Tolerating po well, denies pain.  See flow sheets for vital signs

## 2023-11-21 NOTE — PROGRESS NOTES
1st unit packed RBC's started at 50 ml/hr to infuse, no reactions noted, no distress, vitals stable, see flow sheets

## 2023-11-21 NOTE — PROGRESS NOTES
1 hour post transfusion vital signs stable, no reaction to transfusion noted (see flow sheets). Phlebotomist in to obtain 1 hour post Hemoglobin and hematocrit. Resting on stretcher with call bell in reach, denies nausea,vomiting or pain, respirations even and unlabored.

## 2023-11-21 NOTE — PROGRESS NOTES
Post transfusion Hemoglobin 6.8 and hematocrit 20.3 Will continue as ordered per Dr. Wilson Andrade with 2nd unit of Packed red blood cells

## 2023-11-21 NOTE — PROGRESS NOTES
Patient reports \"burning at site\" of IV in Left antecubital area, slight edema noted.  Blood stopped momentarily to insert another site for transfusion

## 2023-11-21 NOTE — PROGRESS NOTES
Blood infusing well, no reactions noted, rate increased to 200 ml/hr, no complaints voiced, tolerating diet and liquids well, denies nausea or vomiting.

## 2023-11-22 LAB
ABO + RH BLD: NORMAL
ANTIGENS PRESENT RBC DONR: NORMAL
BLD PROD TYP BPU: NORMAL
BLOOD BANK CMNT PATIENT-IMP: NORMAL
BLOOD BANK DISPENSE STATUS: NORMAL
BLOOD GROUP ANTIBODIES SERPL: NORMAL
BLOOD GROUP ANTIBODIES SERPL: POSITIVE
BPU ID: NORMAL
CROSSMATCH RESULT: NORMAL
DAT POLY-SP REAG RBC QL: NEGATIVE
SPECIMEN EXP DATE BLD: NORMAL
TRANSFUSION STATUS PATIENT QL: NORMAL
UNIT DIVISION: 0

## 2023-12-19 ENCOUNTER — APPOINTMENT (OUTPATIENT)
Facility: HOSPITAL | Age: 58
End: 2023-12-19
Attending: EMERGENCY MEDICINE
Payer: MEDICARE

## 2023-12-19 ENCOUNTER — HOSPITAL ENCOUNTER (EMERGENCY)
Facility: HOSPITAL | Age: 58
Discharge: ANOTHER HOSPITAL | End: 2023-12-19
Attending: EMERGENCY MEDICINE
Payer: MEDICARE

## 2023-12-19 VITALS
DIASTOLIC BLOOD PRESSURE: 54 MMHG | SYSTOLIC BLOOD PRESSURE: 101 MMHG | WEIGHT: 286.7 LBS | HEART RATE: 97 BPM | TEMPERATURE: 97.6 F | RESPIRATION RATE: 22 BRPM | BODY MASS INDEX: 45 KG/M2 | HEIGHT: 67 IN | OXYGEN SATURATION: 99 %

## 2023-12-19 DIAGNOSIS — J90 PLEURAL EFFUSION ON LEFT: ICD-10-CM

## 2023-12-19 DIAGNOSIS — D72.819 LEUKOPENIA, UNSPECIFIED TYPE: Primary | ICD-10-CM

## 2023-12-19 LAB
ALBUMIN SERPL-MCNC: 3.2 G/DL (ref 3.5–5)
ALBUMIN/GLOB SERPL: 1 (ref 1.1–2.2)
ALP SERPL-CCNC: 114 U/L (ref 45–117)
ALT SERPL-CCNC: 14 U/L (ref 12–78)
ANION GAP SERPL CALC-SCNC: 9 MMOL/L (ref 5–15)
APPEARANCE UR: CLEAR
ARTERIAL PATENCY WRIST A: YES
AST SERPL W P-5'-P-CCNC: 15 U/L (ref 15–37)
BACTERIA URNS QL MICRO: ABNORMAL /HPF
BASE DEFICIT BLDA-SCNC: 4 MMOL/L
BASOPHILS # BLD: 0 K/UL (ref 0–0.1)
BASOPHILS NFR BLD: 0 % (ref 0–1)
BDY SITE: ABNORMAL
BILIRUB SERPL-MCNC: 1.2 MG/DL (ref 0.2–1)
BILIRUB UR QL CFM: NEGATIVE
BUN SERPL-MCNC: 23 MG/DL (ref 6–20)
BUN/CREAT SERPL: 21 (ref 12–20)
CA-I BLD-MCNC: 8.9 MG/DL (ref 8.5–10.1)
CHLORIDE SERPL-SCNC: 100 MMOL/L (ref 97–108)
CO2 SERPL-SCNC: 25 MMOL/L (ref 21–32)
COHGB MFR BLD: 0.7 % (ref 1–2)
COLOR UR: ABNORMAL
CREAT SERPL-MCNC: 1.11 MG/DL (ref 0.55–1.02)
DIFFERENTIAL METHOD BLD: ABNORMAL
EOSINOPHIL # BLD: 0 K/UL (ref 0–0.4)
EOSINOPHIL NFR BLD: 0 % (ref 0–7)
ERYTHROCYTE [DISTWIDTH] IN BLOOD BY AUTOMATED COUNT: 14.7 % (ref 11.5–14.5)
FLUAV RNA SPEC QL NAA+PROBE: NOT DETECTED
FLUBV RNA SPEC QL NAA+PROBE: NOT DETECTED
GAS FLOW.O2 O2 DELIVERY SYS: 15 L/MIN
GLOBULIN SER CALC-MCNC: 3.3 G/DL (ref 2–4)
GLUCOSE SERPL-MCNC: 129 MG/DL (ref 65–100)
GLUCOSE UR STRIP.AUTO-MCNC: NEGATIVE MG/DL
HCO3 BLDA-SCNC: 21 MMOL/L (ref 22–26)
HCT VFR BLD AUTO: 20.9 % (ref 35–47)
HGB BLD-MCNC: 7.2 G/DL (ref 11.5–16)
HGB UR QL STRIP: ABNORMAL
IMM GRANULOCYTES # BLD AUTO: 0 K/UL
IMM GRANULOCYTES NFR BLD AUTO: 10 %
KETONES UR QL STRIP.AUTO: NEGATIVE MG/DL
LACTATE SERPL-SCNC: 1.6 MMOL/L (ref 0.4–2)
LACTATE SERPL-SCNC: 3.5 MMOL/L (ref 0.4–2)
LEUKOCYTE ESTERASE UR QL STRIP.AUTO: NEGATIVE
LYMPHOCYTES # BLD: 0.1 K/UL (ref 0.8–3.5)
LYMPHOCYTES NFR BLD: 13 % (ref 12–49)
MAGNESIUM SERPL-MCNC: 1.9 MG/DL (ref 1.6–2.4)
MCH RBC QN AUTO: 29.4 PG (ref 26–34)
MCHC RBC AUTO-ENTMCNC: 34.4 G/DL (ref 30–36.5)
MCV RBC AUTO: 85.3 FL (ref 80–99)
METHGB MFR BLD: 0.3 % (ref 0–1.4)
MONOCYTES # BLD: 0 K/UL (ref 0–1)
MONOCYTES NFR BLD: 10 % (ref 5–13)
NEUTS SEG # BLD: 0.3 K/UL (ref 1.8–8)
NEUTS SEG NFR BLD: 67 % (ref 32–75)
NITRITE UR QL STRIP.AUTO: NEGATIVE
NRBC # BLD: 0 K/UL (ref 0–0.01)
NRBC BLD-RTO: 0 PER 100 WBC
OXYHGB MFR BLD: 97.8 % (ref 95–99)
PCO2 BLDA: 35 MMHG (ref 35–45)
PERFORMED BY:: ABNORMAL
PH BLDA: 7.39 (ref 7.35–7.45)
PH UR STRIP: 5.5 (ref 5–8)
PLATELET # BLD AUTO: 44 K/UL (ref 150–400)
PMV BLD AUTO: 11.4 FL (ref 8.9–12.9)
PO2 BLDA: 256 MMHG (ref 80–100)
POTASSIUM SERPL-SCNC: 4.2 MMOL/L (ref 3.5–5.1)
PROT SERPL-MCNC: 6.5 G/DL (ref 6.4–8.2)
PROT UR STRIP-MCNC: 30 MG/DL
RBC # BLD AUTO: 2.45 M/UL (ref 3.8–5.2)
RBC #/AREA URNS HPF: ABNORMAL /HPF (ref 0–3)
RBC MORPH BLD: ABNORMAL
SAO2% DEVICE SAO2% SENSOR NAME: ABNORMAL
SARS-COV-2 RNA RESP QL NAA+PROBE: NOT DETECTED
SODIUM SERPL-SCNC: 134 MMOL/L (ref 136–145)
SP GR UR REFRACTOMETRY: 1.01 (ref 1–1.03)
SPECIMEN SITE: ABNORMAL
URINE CULTURE IF INDICATED: ABNORMAL
UROBILINOGEN UR QL STRIP.AUTO: 0.2 EU/DL (ref 0.2–1)
WBC # BLD AUTO: 0.4 K/UL (ref 3.6–11)
WBC URNS QL MICRO: ABNORMAL /HPF (ref 0–5)

## 2023-12-19 PROCEDURE — 93005 ELECTROCARDIOGRAM TRACING: CPT | Performed by: EMERGENCY MEDICINE

## 2023-12-19 PROCEDURE — 96361 HYDRATE IV INFUSION ADD-ON: CPT

## 2023-12-19 PROCEDURE — 85025 COMPLETE CBC W/AUTO DIFF WBC: CPT

## 2023-12-19 PROCEDURE — 6360000002 HC RX W HCPCS: Performed by: EMERGENCY MEDICINE

## 2023-12-19 PROCEDURE — 94640 AIRWAY INHALATION TREATMENT: CPT

## 2023-12-19 PROCEDURE — 99285 EMERGENCY DEPT VISIT HI MDM: CPT

## 2023-12-19 PROCEDURE — A4216 STERILE WATER/SALINE, 10 ML: HCPCS | Performed by: EMERGENCY MEDICINE

## 2023-12-19 PROCEDURE — 2580000003 HC RX 258: Performed by: EMERGENCY MEDICINE

## 2023-12-19 PROCEDURE — 87040 BLOOD CULTURE FOR BACTERIA: CPT

## 2023-12-19 PROCEDURE — 96375 TX/PRO/DX INJ NEW DRUG ADDON: CPT

## 2023-12-19 PROCEDURE — 83605 ASSAY OF LACTIC ACID: CPT

## 2023-12-19 PROCEDURE — 6370000000 HC RX 637 (ALT 250 FOR IP): Performed by: EMERGENCY MEDICINE

## 2023-12-19 PROCEDURE — 81001 URINALYSIS AUTO W/SCOPE: CPT

## 2023-12-19 PROCEDURE — 87636 SARSCOV2 & INF A&B AMP PRB: CPT

## 2023-12-19 PROCEDURE — 83735 ASSAY OF MAGNESIUM: CPT

## 2023-12-19 PROCEDURE — 36600 WITHDRAWAL OF ARTERIAL BLOOD: CPT

## 2023-12-19 PROCEDURE — 96365 THER/PROPH/DIAG IV INF INIT: CPT

## 2023-12-19 PROCEDURE — 82803 BLOOD GASES ANY COMBINATION: CPT

## 2023-12-19 PROCEDURE — 80053 COMPREHEN METABOLIC PANEL: CPT

## 2023-12-19 PROCEDURE — 71045 X-RAY EXAM CHEST 1 VIEW: CPT

## 2023-12-19 PROCEDURE — C9113 INJ PANTOPRAZOLE SODIUM, VIA: HCPCS | Performed by: EMERGENCY MEDICINE

## 2023-12-19 PROCEDURE — 36415 COLL VENOUS BLD VENIPUNCTURE: CPT

## 2023-12-19 RX ORDER — SODIUM CHLORIDE 9 MG/ML
30 INJECTION, SOLUTION INTRAVENOUS ONCE
Status: COMPLETED | OUTPATIENT
Start: 2023-12-19 | End: 2023-12-19

## 2023-12-19 RX ORDER — ONDANSETRON 2 MG/ML
4 INJECTION INTRAMUSCULAR; INTRAVENOUS ONCE
Status: COMPLETED | OUTPATIENT
Start: 2023-12-19 | End: 2023-12-19

## 2023-12-19 RX ORDER — FUROSEMIDE 10 MG/ML
60 INJECTION INTRAMUSCULAR; INTRAVENOUS
Status: COMPLETED | OUTPATIENT
Start: 2023-12-19 | End: 2023-12-19

## 2023-12-19 RX ORDER — IPRATROPIUM BROMIDE AND ALBUTEROL SULFATE 2.5; .5 MG/3ML; MG/3ML
1 SOLUTION RESPIRATORY (INHALATION)
Status: COMPLETED | OUTPATIENT
Start: 2023-12-19 | End: 2023-12-19

## 2023-12-19 RX ORDER — VANCOMYCIN 2 G/400ML
2000 INJECTION, SOLUTION INTRAVENOUS ONCE
Status: COMPLETED | OUTPATIENT
Start: 2023-12-19 | End: 2023-12-19

## 2023-12-19 RX ADMIN — ONDANSETRON 4 MG: 2 INJECTION INTRAMUSCULAR; INTRAVENOUS at 14:31

## 2023-12-19 RX ADMIN — SODIUM CHLORIDE 30 ML/KG/HR: 9 INJECTION, SOLUTION INTRAVENOUS at 13:32

## 2023-12-19 RX ADMIN — IPRATROPIUM BROMIDE AND ALBUTEROL SULFATE 1 DOSE: .5; 3 SOLUTION RESPIRATORY (INHALATION) at 16:41

## 2023-12-19 RX ADMIN — FUROSEMIDE 60 MG: 10 INJECTION, SOLUTION INTRAMUSCULAR; INTRAVENOUS at 16:45

## 2023-12-19 RX ADMIN — PANTOPRAZOLE SODIUM 40 MG: 40 INJECTION, POWDER, FOR SOLUTION INTRAVENOUS at 14:31

## 2023-12-19 RX ADMIN — VANCOMYCIN 2000 MG: 2 INJECTION, SOLUTION INTRAVENOUS at 17:20

## 2023-12-19 RX ADMIN — PIPERACILLIN AND TAZOBACTAM 4500 MG: 4; .5 INJECTION, POWDER, FOR SOLUTION INTRAVENOUS at 14:05

## 2023-12-19 RX ADMIN — IPRATROPIUM BROMIDE AND ALBUTEROL SULFATE 1 DOSE: .5; 3 SOLUTION RESPIRATORY (INHALATION) at 16:51

## 2023-12-19 ASSESSMENT — PAIN SCALES - GENERAL: PAINLEVEL_OUTOF10: 0

## 2023-12-19 ASSESSMENT — PAIN - FUNCTIONAL ASSESSMENT: PAIN_FUNCTIONAL_ASSESSMENT: 0-10

## 2023-12-19 NOTE — ED NOTES
Pt report given to [facility] ED. Pending ETA of ground transport at this time.

## 2023-12-19 NOTE — ED PROVIDER NOTES
[facility] EMERGENCY DEPT  EMERGENCY DEPARTMENT HISTORY AND PHYSICAL EXAM      Date: 2023  Patient Name: Lorenz Kussmaul  MRN: [id number]  YOB: 1965  Date of evaluation: 2023  Provider: Patty Schreiber MD   Note Started: 2:02 PM EST 23    HISTORY OF PRESENT ILLNESS     Chief Complaint   Patient presents with    Emesis    Chills       History Provided By: Patient    HPI: Lorenz Kussmaul is a 59 y.o. female     PAST MEDICAL HISTORY   Past Medical History:  Past Medical History:   Diagnosis Date    Asthma     Cerebral artery occlusion with cerebral infarction ([address])     History of stroke without residual deficits     Morbid obesity with BMI of 45.0-49.9, adult ([address]) 2016    MRSA carrier 2019    Osteoarthritis of multiple joints     Primary localized osteoarthritis of right hip 2017    Seizure disorder ([address])     last seizure     Thrombocytosis        Past Surgical History:  Past Surgical History:   Procedure Laterality Date    BREAST BIOPSY Left     NEG     SECTION      x 2    HEENT      LASIK    JOINT REPLACEMENT      KNEE ARTHROSCOPY Bilateral     ORTHOPEDIC SURGERY Left     TKR    ORTHOPEDIC SURGERY Left 2016    [address] [facility]    ORTHOPEDIC SURGERY Right     RTH    OTHER SURGICAL HISTORY      BIOPSY LEFT LOWER LEG(AGE 23)    TONSILLECTOMY      TOTAL KNEE ARTHROPLASTY Right 2018    TUBAL LIGATION         Family History:  Family History   Problem Relation Age of Onset    Diabetes Mother     Cancer Mother 48        breast cancer    Anesth Problems Mother         PONV    Heart Disease Father     Hypertension Father     Lung Cancer Father     Brain Cancer Father     No Known Problems Brother        Social History:  Social History     Tobacco Use    Smoking status: Never     Passive exposure: Never    Smokeless tobacco: Never   Vaping Use    Vaping Use: Never used   Substance Use Topics    Alcohol use: Not Currently     Comment: social/seldomly    Drug use:  No appointments, meetings, work or from getting things needed for daily living?: Not on file   Physical Activity: Not on file   Stress: Not on file   Social Connections: Not on file   Intimate Partner Violence: Not on file   Depression: Not at risk (10/20/2023)    PHQ-2     PHQ-2 Score: 0   Housing Stability: Low Risk  (11/1/2023)    Housing Stability Vital Sign     Unable to Pay for Housing in the Last Year: No     Number of Places Lived in the Last Year: 1     Unstable Housing in the Last Year: No   Interpersonal Safety: Not At Risk (11/1/2023)    Interpersonal Safety ([facility] HRSN)     How often does anyone, including family and friends, physically hurt you?: Never     How often does anyone, including family and friends, scream or curse at you?: Not on file     How often does anyone, including family and friends, insult or talk down to you?: Not on file     How often does anyone, including family and friends, threaten you with harm?: Not on file   Utilities: Not At Risk (11/1/2023)    [facility] Utilities     Threatened with loss of utilities: No       PHYSICAL EXAM   Physical Exam  Vitals and nursing note reviewed.   Constitutional:       General: She is not in acute distress.     Appearance: She is obese. She is ill-appearing. She is not toxic-appearing or diaphoretic.   HENT:      Head: Normocephalic and atraumatic.   Cardiovascular:      Rate and Rhythm: Tachycardia present.   Pulmonary:      Effort: Pulmonary effort is normal.      Breath sounds: Normal breath sounds. No wheezing or rales.   Chest:       Abdominal:      Palpations: Abdomen is soft.      Tenderness: There is abdominal tenderness in the left upper quadrant.   Skin:     Capillary Refill: Capillary refill takes less than 2 seconds.   Neurological:      General: No focal deficit present.   Psychiatric:         Mood and Affect: Mood normal.         Behavior: Behavior normal.           SCREENINGS              LAB, EKG AND DIAGNOSTIC RESULTS   Labs:  Recent

## 2023-12-19 NOTE — ED NOTES
Pt unable to collect urine sample at this time. NS infusing, pt tolerating PO water. Family at bedside updated on plan of care.

## 2023-12-19 NOTE — ED NOTES
Transfer Center called- still working on Transfer to [facility]- made her aware pt is an established pt at [facility]

## 2023-12-19 NOTE — ED NOTES
Pt incontinent of urine, changed and new purewick replaced. Pt positioned to comfort, no other needs vocalized. Shoes and pants placed into belongings bag and given to family. Pt provided with gown.

## 2023-12-19 NOTE — ED TRIAGE NOTES
PT reports she is currently receiving treatment and blood transfusions for \"blood cancer\". PT reports last transfusion was yesterday. Per pt she began feeling weak and dizzy yesterday. PT endorses chills and vomiting. Per EMS when they arrived to evaluate pt she was noted lethargic. Code sepsis called from triage.

## 2023-12-19 NOTE — ED NOTES
Pt assisted to restroom and returned by . Pt noted to be breathing heavily, lips appear blue-tinged. o2 applied and RT called to bedside. Pt hyperventilating at this time. NS infusion stopped. Pt received approximately 2500 ml NS. MD sheikh.

## 2023-12-20 LAB
EKG ATRIAL RATE: 105 BPM
EKG DIAGNOSIS: NORMAL
EKG P AXIS: 11 DEGREES
EKG P-R INTERVAL: 118 MS
EKG Q-T INTERVAL: 295 MS
EKG QRS DURATION: 87 MS
EKG QTC CALCULATION (BAZETT): 388 MS
EKG R AXIS: 18 DEGREES
EKG T AXIS: 240 DEGREES
EKG VENTRICULAR RATE: 104 BPM

## 2023-12-25 LAB
BACTERIA SPEC CULT: NORMAL
BACTERIA SPEC CULT: NORMAL
Lab: NORMAL
Lab: NORMAL

## 2024-02-12 NOTE — ROUTINE PROCESS
Rounds with [facility]. Patient in bed awake, coughing, HOB elevated, only complains of constant coughing. All concerns addressed and call light within reach. [Time Spent: ___ minutes] : I have spent [unfilled] minutes of time on the encounter.

## 2024-06-11 NOTE — PROGRESS NOTES
presents to unit via ambulance with c/o throwing up a medicine sized cup of blood and dizziness and blurring vision. Denies vaginal bleeding, lof, and contractions. Perceives good fetal movement. EDC:24. 28 weeks 3 days gestation. Pt reports having a placenta previa with current pregnancy. Placed on efm. Call light within reach.   Problem: Chronic Obstructive Pulmonary Disease (COPD)  Goal: *Oxygen saturation during activity within specified parameters  Outcome: Progressing Towards Goal  Note: Assess pulse ox reading and resp. Status and document  Administer o2 therapy as ordered  Administer rep.  Neb treatments as ordered  Administer meds as ordered  Assist with ADL's

## 2025-01-31 NOTE — PROGRESS NOTES
10/9/2024 Nicky consult  2016=Mod DANUTA/CPAP from ?=Please ask pt to provide CPAP download    A1C  6.9 improved    Lorrie RD # 4 on 25=Off Ozempic, Ins not covering-Met all goals except exercising and 7 more lbs to lose. Next appt 2025  Initial Weight: 440 pounds at initial bariatric consult on 10/9/2024  5% Weight Loss Goal: 22 pounds, to goal weight of 418 pounds (updated start weight from initial RD consult)  Weight as of this encounter: 192.8 kg (425 lb 0.8 oz)   Weight changes: Down 13 lbs since last month's RD appt.    Dr Sousa 2024 Asked to return 2025=cleared    PT 10/31/2024   Bedside and Verbal shift change report given to BAM Manuel (oncoming nurse) by MARVIN Pool RN (offgoing nurse). Report given with SBAR, Kardex, Intake/Output, MAR and Recent Results.
